# Patient Record
Sex: MALE | Race: WHITE | NOT HISPANIC OR LATINO
[De-identification: names, ages, dates, MRNs, and addresses within clinical notes are randomized per-mention and may not be internally consistent; named-entity substitution may affect disease eponyms.]

---

## 2018-01-01 ENCOUNTER — TRANSCRIPTION ENCOUNTER (OUTPATIENT)
Age: 43
End: 2018-01-01

## 2020-02-05 ENCOUNTER — APPOINTMENT (OUTPATIENT)
Dept: HEART AND VASCULAR | Facility: CLINIC | Age: 45
End: 2020-02-05
Payer: COMMERCIAL

## 2020-02-05 VITALS
BODY MASS INDEX: 30.46 KG/M2 | OXYGEN SATURATION: 99 % | DIASTOLIC BLOOD PRESSURE: 80 MMHG | RESPIRATION RATE: 14 BRPM | HEART RATE: 90 BPM | WEIGHT: 245 LBS | HEIGHT: 75 IN | SYSTOLIC BLOOD PRESSURE: 110 MMHG

## 2020-02-05 DIAGNOSIS — Z81.8 FAMILY HISTORY OF OTHER MENTAL AND BEHAVIORAL DISORDERS: ICD-10-CM

## 2020-02-05 DIAGNOSIS — Z82.49 FAMILY HISTORY OF ISCHEMIC HEART DISEASE AND OTHER DISEASES OF THE CIRCULATORY SYSTEM: ICD-10-CM

## 2020-02-05 DIAGNOSIS — F17.200 NICOTINE DEPENDENCE, UNSPECIFIED, UNCOMPLICATED: ICD-10-CM

## 2020-02-05 DIAGNOSIS — Z78.9 OTHER SPECIFIED HEALTH STATUS: ICD-10-CM

## 2020-02-05 DIAGNOSIS — Z86.39 PERSONAL HISTORY OF OTHER ENDOCRINE, NUTRITIONAL AND METABOLIC DISEASE: ICD-10-CM

## 2020-02-05 DIAGNOSIS — Z80.1 FAMILY HISTORY OF MALIGNANT NEOPLASM OF TRACHEA, BRONCHUS AND LUNG: ICD-10-CM

## 2020-02-05 DIAGNOSIS — Z87.891 PERSONAL HISTORY OF NICOTINE DEPENDENCE: ICD-10-CM

## 2020-02-05 DIAGNOSIS — R53.83 OTHER FATIGUE: ICD-10-CM

## 2020-02-05 DIAGNOSIS — Z87.19 PERSONAL HISTORY OF OTHER DISEASES OF THE DIGESTIVE SYSTEM: ICD-10-CM

## 2020-02-05 PROCEDURE — 93000 ELECTROCARDIOGRAM COMPLETE: CPT

## 2020-02-05 PROCEDURE — 99204 OFFICE O/P NEW MOD 45 MIN: CPT

## 2020-02-05 PROCEDURE — 36415 COLL VENOUS BLD VENIPUNCTURE: CPT

## 2020-02-05 RX ORDER — METFORMIN HYDROCHLORIDE 1000 MG/1
1000 TABLET, COATED ORAL TWICE DAILY
Qty: 180 | Refills: 1 | Status: DISCONTINUED | COMMUNITY
End: 2020-02-05

## 2020-02-05 RX ORDER — MIFEPRISTONE 300 MG/1
TABLET ORAL
Refills: 0 | Status: ACTIVE | COMMUNITY

## 2020-02-05 RX ORDER — PIOGLITAZONE HYDROCHLORIDE 30 MG/1
30 TABLET ORAL DAILY
Refills: 0 | Status: DISCONTINUED | COMMUNITY
End: 2020-02-05

## 2020-02-05 RX ORDER — ROSUVASTATIN CALCIUM 40 MG/1
40 TABLET, FILM COATED ORAL DAILY
Refills: 0 | Status: ACTIVE | COMMUNITY

## 2020-02-08 LAB
ALBUMIN SERPL ELPH-MCNC: 5 G/DL
ALP BLD-CCNC: 69 U/L
ALT SERPL-CCNC: 35 U/L
ANION GAP SERPL CALC-SCNC: 14 MMOL/L
APPEARANCE: CLEAR
AST SERPL-CCNC: 22 U/L
BASOPHILS # BLD AUTO: 0.07 K/UL
BASOPHILS NFR BLD AUTO: 0.8 %
BILIRUB SERPL-MCNC: 0.6 MG/DL
BILIRUBIN URINE: NEGATIVE
BLOOD URINE: NEGATIVE
BUN SERPL-MCNC: 14 MG/DL
CALCIUM SERPL-MCNC: 10.4 MG/DL
CHLORIDE SERPL-SCNC: 96 MMOL/L
CHOLEST SERPL-MCNC: 296 MG/DL
CHOLEST/HDLC SERPL: 8.5 RATIO
CO2 SERPL-SCNC: 25 MMOL/L
COLOR: YELLOW
CREAT SERPL-MCNC: 0.71 MG/DL
CREAT SPEC-SCNC: 92 MG/DL
EOSINOPHIL # BLD AUTO: 0.18 K/UL
EOSINOPHIL NFR BLD AUTO: 2 %
ESTIMATED AVERAGE GLUCOSE: 266 MG/DL
GLUCOSE QUALITATIVE U: NEGATIVE
GLUCOSE SERPL-MCNC: 262 MG/DL
HBA1C MFR BLD HPLC: 10.9 %
HCT VFR BLD CALC: 51 %
HDLC SERPL-MCNC: 35 MG/DL
HGB BLD-MCNC: 16.2 G/DL
IMM GRANULOCYTES NFR BLD AUTO: 0.2 %
KETONES URINE: NEGATIVE
LDLC SERPL CALC-MCNC: 202 MG/DL
LEUKOCYTE ESTERASE URINE: NEGATIVE
LYMPHOCYTES # BLD AUTO: 1.91 K/UL
LYMPHOCYTES NFR BLD AUTO: 21 %
MAGNESIUM SERPL-MCNC: 1.9 MG/DL
MAN DIFF?: NORMAL
MCHC RBC-ENTMCNC: 31.3 PG
MCHC RBC-ENTMCNC: 31.8 GM/DL
MCV RBC AUTO: 98.6 FL
MICROALBUMIN 24H UR DL<=1MG/L-MCNC: <1.2 MG/DL
MICROALBUMIN/CREAT 24H UR-RTO: NORMAL MG/G
MONOCYTES # BLD AUTO: 0.62 K/UL
MONOCYTES NFR BLD AUTO: 6.8 %
NEUTROPHILS # BLD AUTO: 6.3 K/UL
NEUTROPHILS NFR BLD AUTO: 69.2 %
NITRITE URINE: NEGATIVE
PH URINE: 6
PLATELET # BLD AUTO: 241 K/UL
POTASSIUM SERPL-SCNC: 4.9 MMOL/L
PROT SERPL-MCNC: 7.8 G/DL
PROTEIN URINE: NEGATIVE
RBC # BLD: 5.17 M/UL
RBC # FLD: 12.4 %
SODIUM SERPL-SCNC: 135 MMOL/L
SPECIFIC GRAVITY URINE: 1.01
TRIGL SERPL-MCNC: 292 MG/DL
TSH SERPL-ACNC: 1.52 UIU/ML
UROBILINOGEN URINE: NORMAL
WBC # FLD AUTO: 9.1 K/UL

## 2020-02-09 PROBLEM — R53.83 FATIGUE: Status: ACTIVE | Noted: 2020-02-09

## 2020-02-09 PROBLEM — Z78.9 CAFFEINE USE: Status: ACTIVE | Noted: 2020-02-05

## 2020-02-09 PROBLEM — F17.200 CURRENT EVERY DAY SMOKER: Status: ACTIVE | Noted: 2020-02-05

## 2020-02-09 PROBLEM — Z86.39 HISTORY OF TYPE 2 DIABETES MELLITUS: Status: RESOLVED | Noted: 2020-02-05 | Resolved: 2020-02-09

## 2020-02-09 PROBLEM — Z78.9 ALCOHOL INGESTION: Status: ACTIVE | Noted: 2020-02-05

## 2020-02-09 PROBLEM — Z87.19 HISTORY OF GASTRIC ULCER: Status: RESOLVED | Noted: 2020-02-05 | Resolved: 2020-02-09

## 2020-02-09 NOTE — REVIEW OF SYSTEMS
[Feeling Fatigued] : feeling fatigued [Numbness (Hypesthesia)] : numbness [Dyspnea on exertion] : dyspnea during exertion [Negative] : Heme/Lymph

## 2020-02-09 NOTE — PHYSICAL EXAM
[General Appearance - Well Developed] : well developed [Normal Appearance] : normal appearance [General Appearance - Well Nourished] : well nourished [No Deformities] : no deformities [Well Groomed] : well groomed [General Appearance - In No Acute Distress] : no acute distress [Normal Conjunctiva] : the conjunctiva exhibited no abnormalities [Eyelids - No Xanthelasma] : the eyelids demonstrated no xanthelasmas [No Oral Cyanosis] : no oral cyanosis [Heart Rate And Rhythm] : heart rate and rhythm were normal [Heart Sounds] : normal S1 and S2 [Murmurs] : no murmurs present [Respiration, Rhythm And Depth] : normal respiratory rhythm and effort [Edema] : no peripheral edema present [Auscultation Breath Sounds / Voice Sounds] : lungs were clear to auscultation bilaterally [Abnormal Walk] : normal gait [Exaggerated Use Of Accessory Muscles For Inspiration] : no accessory muscle use [Gait - Sufficient For Exercise Testing] : the gait was sufficient for exercise testing [Cyanosis, Localized] : no localized cyanosis [Nail Clubbing] : no clubbing of the fingernails [Petechial Hemorrhages (___cm)] : no petechial hemorrhages [Skin Color & Pigmentation] : normal skin color and pigmentation [Skin Turgor] : normal skin turgor [] : no rash [No Skin Ulcers] : no skin ulcer [No Venous Stasis] : no venous stasis [Skin Lesions] : no skin lesions [Oriented To Time, Place, And Person] : oriented to person, place, and time [No Xanthoma] : no  xanthoma was observed [Affect] : the affect was normal [Mood] : the mood was normal [No Anxiety] : not feeling anxious [FreeTextEntry1] : anicteric

## 2020-02-09 NOTE — REASON FOR VISIT
[Consultation] : a consultation regarding [Abnormal ECG] : an abnormal ECG [Dyspnea] : dyspnea [Hyperlipidemia] : hyperlipidemia [Hypertension] : hypertension [FreeTextEntry1] : 44 year old male  with poorly controlled type 2 diabetes , HTN  has LAFB on resting EKG and a family hx of premature CAD.   Last stress test was 6 to 7 years ago. \par \par He is being treated for hypercortisolism  with Santhosh

## 2020-02-09 NOTE — ASSESSMENT
[FreeTextEntry1] : suboptimal glycemic control , type 2 diabetes \par \par ? hypercortisolism  ( ? central etiology)\par \par Controlled HTN\par \par LAFB   with dyspnea on exertion \par \par \par smoker \par \par

## 2020-02-09 NOTE — DISCUSSION/SUMMARY
[Isotope Perfusion Sestamibi] : cardiac perfusion imaging with sestamibi [___ Week(s)] : [unfilled] week(s) [Outpatient Evaluation] : the evaluation will be done as an outpatient [With Me] : with me [de-identified] : NIECY [FreeTextEntry1] : venipuncture performed  with HgbA1c, lipids,  TSH\par \par \par set up nuclear stress test   and echocardiogram \par \par Counseled to quit smoking

## 2020-02-09 NOTE — HISTORY OF PRESENT ILLNESS
[FreeTextEntry1] : EKG shows NSR 85 bpm  with LAFB without ectopy, ischemia or LVH \par \par he has remote hx of peptic ulcer disease \par \par He has past hx of smoking , quit and then resumed smoking  this past January \par \par Denies hx of MI, CHF, syncope , asthma, strokes, TIAs\par \par Last HgbA1c 7.2%  (November 2019)\par \par Reports that he is being worked up  pituitary abnormality and has MRI scheduled (but he suffers from claustrophobia) \par \par Main complaints are fatigue and dyspnea on exertion

## 2020-03-09 ENCOUNTER — APPOINTMENT (OUTPATIENT)
Dept: HEART AND VASCULAR | Facility: CLINIC | Age: 45
End: 2020-03-09
Payer: COMMERCIAL

## 2020-03-09 PROCEDURE — 93306 TTE W/DOPPLER COMPLETE: CPT

## 2020-03-09 PROCEDURE — 78452 HT MUSCLE IMAGE SPECT MULT: CPT

## 2020-03-09 PROCEDURE — 93015 CV STRESS TEST SUPVJ I&R: CPT

## 2020-03-09 PROCEDURE — A9500: CPT

## 2020-07-06 ENCOUNTER — APPOINTMENT (OUTPATIENT)
Dept: HEART AND VASCULAR | Facility: CLINIC | Age: 45
End: 2020-07-06
Payer: COMMERCIAL

## 2020-07-06 VITALS
BODY MASS INDEX: 31.08 KG/M2 | OXYGEN SATURATION: 97 % | WEIGHT: 250 LBS | TEMPERATURE: 97.3 F | HEIGHT: 75 IN | RESPIRATION RATE: 14 BRPM | SYSTOLIC BLOOD PRESSURE: 118 MMHG | HEART RATE: 88 BPM | DIASTOLIC BLOOD PRESSURE: 76 MMHG

## 2020-07-06 DIAGNOSIS — R07.9 CHEST PAIN, UNSPECIFIED: ICD-10-CM

## 2020-07-06 DIAGNOSIS — R94.39 ABNORMAL RESULT OF OTHER CARDIOVASCULAR FUNCTION STUDY: ICD-10-CM

## 2020-07-06 PROCEDURE — 99213 OFFICE O/P EST LOW 20 MIN: CPT

## 2020-07-09 PROBLEM — R07.9 CHEST PAIN: Status: ACTIVE | Noted: 2020-07-09

## 2020-07-09 PROBLEM — R94.39 ABNORMAL NUCLEAR STRESS TEST: Status: ACTIVE | Noted: 2020-07-09

## 2020-07-09 NOTE — HISTORY OF PRESENT ILLNESS
[FreeTextEntry1] : Catheterization was urgently deferred because of Covid 19 epidemic  with numerous hospital admissions \par \par Now he presents for follow up. Still has vague left sided chest discomfort \par \par LVEF is  normal at 55% \par \par Remote hx of peptic ulcer disease \par \par  mg/dL  and he does not follow a diet

## 2020-07-09 NOTE — PHYSICAL EXAM
[General Appearance - Well Developed] : well developed [Normal Appearance] : normal appearance [Well Groomed] : well groomed [General Appearance - Well Nourished] : well nourished [No Deformities] : no deformities [General Appearance - In No Acute Distress] : no acute distress [Normal Conjunctiva] : the conjunctiva exhibited no abnormalities [Eyelids - No Xanthelasma] : the eyelids demonstrated no xanthelasmas [No Oral Cyanosis] : no oral cyanosis [Respiration, Rhythm And Depth] : normal respiratory rhythm and effort [Exaggerated Use Of Accessory Muscles For Inspiration] : no accessory muscle use [Auscultation Breath Sounds / Voice Sounds] : lungs were clear to auscultation bilaterally [Heart Rate And Rhythm] : heart rate and rhythm were normal [Heart Sounds] : normal S1 and S2 [Murmurs] : no murmurs present [Edema] : no peripheral edema present [Abnormal Walk] : normal gait [Gait - Sufficient For Exercise Testing] : the gait was sufficient for exercise testing [Nail Clubbing] : no clubbing of the fingernails [Cyanosis, Localized] : no localized cyanosis [Petechial Hemorrhages (___cm)] : no petechial hemorrhages [FreeTextEntry1] : atrophic hand muscles  [Skin Color & Pigmentation] : normal skin color and pigmentation [Skin Turgor] : normal skin turgor [] : no rash [Skin Lesions] : no skin lesions [No Venous Stasis] : no venous stasis [No Skin Ulcers] : no skin ulcer [No Xanthoma] : no  xanthoma was observed [Oriented To Time, Place, And Person] : oriented to person, place, and time [Affect] : the affect was normal [Mood] : the mood was normal [No Anxiety] : not feeling anxious

## 2020-07-09 NOTE — REASON FOR VISIT
[Follow-Up - Clinic] : a clinic follow-up of [Abnormal ECG] : an abnormal ECG [Hyperlipidemia] : hyperlipidemia [Hypertension] : hypertension [FreeTextEntry1] : 44 year old male  with poorly controlled type 2 diabetes , HTN  has LAFB on resting EKG and a family hx of premature CAD.   Nuclear stress test March 2020 was abnormal  and he was referred for cardiac catheterization\par \par He is being treated for hypercortisolism  with Santhosh

## 2020-07-09 NOTE — DISCUSSION/SUMMARY
[FreeTextEntry1] : advised to start aspirin 81 mg with food  OTC PPI  omeprazole \par \par Will  try to get Repatha authorization   to target LDL below 70mg/dL if obstructive CAD is detected on catheterization

## 2020-07-09 NOTE — REVIEW OF SYSTEMS
[Feeling Fatigued] : feeling fatigued [Dyspnea on exertion] : dyspnea during exertion [Chest Pain] : chest pain [Numbness (Hypesthesia)] : numbness [Negative] : Heme/Lymph

## 2020-07-09 NOTE — ASSESSMENT
[FreeTextEntry1] : Poorly controlled DM\par \par Hyperlipidemia \par \par abnormal nuclear stress test

## 2020-07-13 ENCOUNTER — LABORATORY RESULT (OUTPATIENT)
Age: 45
End: 2020-07-13

## 2020-07-13 PROBLEM — E11.9 TYPE 2 DIABETES MELLITUS WITHOUT COMPLICATIONS: Chronic | Status: ACTIVE | Noted: 2020-03-13

## 2020-07-13 PROBLEM — E78.5 HYPERLIPIDEMIA, UNSPECIFIED: Chronic | Status: ACTIVE | Noted: 2020-03-13

## 2020-07-13 PROBLEM — I10 ESSENTIAL (PRIMARY) HYPERTENSION: Chronic | Status: ACTIVE | Noted: 2020-03-13

## 2020-07-14 LAB
ALBUMIN SERPL ELPH-MCNC: 5.1 G/DL
ALP BLD-CCNC: 49 U/L
ALT SERPL-CCNC: 21 U/L
ANION GAP SERPL CALC-SCNC: 18 MMOL/L
APPEARANCE: CLEAR
APTT BLD: 29.5 SEC
AST SERPL-CCNC: 20 U/L
BASOPHILS # BLD AUTO: 0.06 K/UL
BASOPHILS NFR BLD AUTO: 0.7 %
BILIRUB SERPL-MCNC: 0.6 MG/DL
BILIRUBIN URINE: NEGATIVE
BLOOD URINE: NEGATIVE
BUN SERPL-MCNC: 22 MG/DL
CALCIUM SERPL-MCNC: 10.1 MG/DL
CHLORIDE SERPL-SCNC: 100 MMOL/L
CHOLEST SERPL-MCNC: 112 MG/DL
CHOLEST/HDLC SERPL: 3.6 RATIO
CO2 SERPL-SCNC: 22 MMOL/L
COLOR: YELLOW
CREAT SERPL-MCNC: 0.66 MG/DL
EOSINOPHIL # BLD AUTO: 0.22 K/UL
EOSINOPHIL NFR BLD AUTO: 2.4 %
ESTIMATED AVERAGE GLUCOSE: 263 MG/DL
GLUCOSE QUALITATIVE U: NEGATIVE
GLUCOSE SERPL-MCNC: 101 MG/DL
HBA1C MFR BLD HPLC: 10.8 %
HCT VFR BLD CALC: 47.2 %
HDLC SERPL-MCNC: 31 MG/DL
HGB BLD-MCNC: 15.1 G/DL
IMM GRANULOCYTES NFR BLD AUTO: 0.3 %
INR PPP: 0.9 RATIO
KETONES URINE: NEGATIVE
LDLC SERPL CALC-MCNC: 51 MG/DL
LEUKOCYTE ESTERASE URINE: ABNORMAL
LYMPHOCYTES # BLD AUTO: 2.32 K/UL
LYMPHOCYTES NFR BLD AUTO: 25.7 %
MAN DIFF?: NORMAL
MCHC RBC-ENTMCNC: 29.5 PG
MCHC RBC-ENTMCNC: 32 GM/DL
MCV RBC AUTO: 92.4 FL
MONOCYTES # BLD AUTO: 0.67 K/UL
MONOCYTES NFR BLD AUTO: 7.4 %
NEUTROPHILS # BLD AUTO: 5.72 K/UL
NEUTROPHILS NFR BLD AUTO: 63.5 %
NITRITE URINE: NEGATIVE
NT-PROBNP SERPL-MCNC: 25 PG/ML
PH URINE: 6.5
PLATELET # BLD AUTO: 204 K/UL
POTASSIUM SERPL-SCNC: 4.2 MMOL/L
PROT SERPL-MCNC: 7.4 G/DL
PROTEIN URINE: NEGATIVE
PT BLD: 10.8 SEC
RBC # BLD: 5.11 M/UL
RBC # FLD: 12.2 %
SARS-COV-2 N GENE NPH QL NAA+PROBE: NOT DETECTED
SODIUM SERPL-SCNC: 140 MMOL/L
SPECIFIC GRAVITY URINE: 1.02
TRIGL SERPL-MCNC: 148 MG/DL
TSH SERPL-ACNC: 1.59 UIU/ML
UROBILINOGEN URINE: NORMAL
WBC # FLD AUTO: 9.02 K/UL

## 2020-08-26 ENCOUNTER — OUTPATIENT (OUTPATIENT)
Dept: OUTPATIENT SERVICES | Facility: HOSPITAL | Age: 45
LOS: 1 days | End: 2020-08-26

## 2020-08-26 ENCOUNTER — APPOINTMENT (OUTPATIENT)
Dept: CT IMAGING | Facility: CLINIC | Age: 45
End: 2020-08-26
Payer: COMMERCIAL

## 2020-08-26 ENCOUNTER — RESULT REVIEW (OUTPATIENT)
Age: 45
End: 2020-08-26

## 2020-08-26 PROCEDURE — 75574 CT ANGIO HRT W/3D IMAGE: CPT | Mod: 26

## 2020-08-28 RX ORDER — CHLORHEXIDINE GLUCONATE 213 G/1000ML
1 SOLUTION TOPICAL ONCE
Refills: 0 | Status: DISCONTINUED | OUTPATIENT
Start: 2020-09-03 | End: 2020-09-03

## 2020-08-28 NOTE — H&P ADULT - HISTORY OF PRESENT ILLNESS
*Verify Meds*  COVID: 8/31 @ [address] [place] [address]  Escort: Brother in law  Pharmacy: [facility] [id number] [place] [address]  Cardiologist: Dr. Mcgill  43 yo M, current smoker, FH of premature CAD, with a PMH of HTN, known LAFB, uncontrolled Type II DM, who presented to outpatient cardiologist Dr. Mcgill as a new patient visit. In light of strong FH, patient was referred for stress testing. Denies any symptoms including CP, SOB, dizziness, diaphoresis, palpitations, orthopnea/PND, abdominal pain, N/V/D, urinary sx, BRBPR, hematuria, melena, LE swelling, recent travel/sick contacts/illness.. NST 3/9/2020: negative submaximal stress test for ischemia, EF 55%, 80% max predicted HR, .5mm upsloping ST depressions. Echocardiogram 3/9/2020: Normal RV/LVEF, EF 57%, hypokinesis of the following LV walls: posterolateral, anterolateral. CCTA 8/26/20: Calcium score 746 (99th percentile), severe stenosis in mLAD, moderate stenosis in pLAD and pRCA, unable to evaluate mRCA due to motion artifact, non-obstructive disease in remaining coronary segments.   In light of patient’s risk factors and abnormal CCTA, patient is referred for cardiac catheterization with possible intervention if clinically indicated.

## 2020-08-28 NOTE — H&P ADULT - NSICDXFAMILYHX_GEN_ALL_CORE_FT
FAMILY HISTORY:  FH: lung cancer, father  FH: myocardial infarction, Brother MI @ 41 (CABG), Father @ 55, Grandfather

## 2020-08-31 ENCOUNTER — LABORATORY RESULT (OUTPATIENT)
Age: 45
End: 2020-08-31

## 2020-09-03 ENCOUNTER — INPATIENT (INPATIENT)
Facility: HOSPITAL | Age: 45
LOS: 0 days | Discharge: HOME | DRG: 247 | End: 2020-09-04
Attending: INTERNAL MEDICINE | Admitting: INTERNAL MEDICINE
Payer: COMMERCIAL

## 2020-09-03 ENCOUNTER — TRANSCRIPTION ENCOUNTER (OUTPATIENT)
Age: 45
End: 2020-09-03

## 2020-09-03 VITALS
HEART RATE: 74 BPM | OXYGEN SATURATION: 96 % | DIASTOLIC BLOOD PRESSURE: 79 MMHG | SYSTOLIC BLOOD PRESSURE: 137 MMHG | RESPIRATION RATE: 16 BRPM

## 2020-09-03 LAB
A1C WITH ESTIMATED AVERAGE GLUCOSE RESULT: 8.4 % — HIGH (ref 4–5.6)
ALBUMIN SERPL ELPH-MCNC: 5 G/DL — SIGNIFICANT CHANGE UP (ref 3.3–5)
ALP SERPL-CCNC: 55 U/L — SIGNIFICANT CHANGE UP (ref 40–120)
ALT FLD-CCNC: 26 U/L — SIGNIFICANT CHANGE UP (ref 10–45)
ANION GAP SERPL CALC-SCNC: 14 MMOL/L — SIGNIFICANT CHANGE UP (ref 5–17)
APTT BLD: 28.4 SEC — SIGNIFICANT CHANGE UP (ref 27.5–35.5)
AST SERPL-CCNC: 21 U/L — SIGNIFICANT CHANGE UP (ref 10–40)
BASOPHILS # BLD AUTO: 0.06 K/UL — SIGNIFICANT CHANGE UP (ref 0–0.2)
BASOPHILS NFR BLD AUTO: 0.7 % — SIGNIFICANT CHANGE UP (ref 0–2)
BILIRUB SERPL-MCNC: 0.6 MG/DL — SIGNIFICANT CHANGE UP (ref 0.2–1.2)
BUN SERPL-MCNC: 17 MG/DL — SIGNIFICANT CHANGE UP (ref 7–23)
CALCIUM SERPL-MCNC: 9.7 MG/DL — SIGNIFICANT CHANGE UP (ref 8.4–10.5)
CHLORIDE SERPL-SCNC: 101 MMOL/L — SIGNIFICANT CHANGE UP (ref 96–108)
CHOLEST SERPL-MCNC: 101 MG/DL — SIGNIFICANT CHANGE UP (ref 10–199)
CK MB CFR SERPL CALC: 6 NG/ML — SIGNIFICANT CHANGE UP (ref 0–6.7)
CK SERPL-CCNC: 214 U/L — HIGH (ref 30–200)
CO2 SERPL-SCNC: 24 MMOL/L — SIGNIFICANT CHANGE UP (ref 22–31)
CREAT SERPL-MCNC: 0.71 MG/DL — SIGNIFICANT CHANGE UP (ref 0.5–1.3)
CRP SERPL-MCNC: 0.15 MG/DL — SIGNIFICANT CHANGE UP (ref 0–0.4)
EOSINOPHIL # BLD AUTO: 0.17 K/UL — SIGNIFICANT CHANGE UP (ref 0–0.5)
EOSINOPHIL NFR BLD AUTO: 1.9 % — SIGNIFICANT CHANGE UP (ref 0–6)
ESTIMATED AVERAGE GLUCOSE: 194 MG/DL — HIGH (ref 68–114)
GLUCOSE SERPL-MCNC: 157 MG/DL — HIGH (ref 70–99)
HCT VFR BLD CALC: 45.5 % — SIGNIFICANT CHANGE UP (ref 39–50)
HCV AB S/CO SERPL IA: 0.04 S/CO — SIGNIFICANT CHANGE UP
HCV AB SERPL-IMP: SIGNIFICANT CHANGE UP
HDLC SERPL-MCNC: 29 MG/DL — LOW
HGB BLD-MCNC: 15.3 G/DL — SIGNIFICANT CHANGE UP (ref 13–17)
IMM GRANULOCYTES NFR BLD AUTO: 0.6 % — SIGNIFICANT CHANGE UP (ref 0–1.5)
INR BLD: 0.91 — SIGNIFICANT CHANGE UP (ref 0.88–1.16)
LIPID PNL WITH DIRECT LDL SERPL: 42 MG/DL — SIGNIFICANT CHANGE UP
LYMPHOCYTES # BLD AUTO: 2.08 K/UL — SIGNIFICANT CHANGE UP (ref 1–3.3)
LYMPHOCYTES # BLD AUTO: 23 % — SIGNIFICANT CHANGE UP (ref 13–44)
MCHC RBC-ENTMCNC: 30.4 PG — SIGNIFICANT CHANGE UP (ref 27–34)
MCHC RBC-ENTMCNC: 33.6 GM/DL — SIGNIFICANT CHANGE UP (ref 32–36)
MCV RBC AUTO: 90.3 FL — SIGNIFICANT CHANGE UP (ref 80–100)
MONOCYTES # BLD AUTO: 0.58 K/UL — SIGNIFICANT CHANGE UP (ref 0–0.9)
MONOCYTES NFR BLD AUTO: 6.4 % — SIGNIFICANT CHANGE UP (ref 2–14)
NEUTROPHILS # BLD AUTO: 6.1 K/UL — SIGNIFICANT CHANGE UP (ref 1.8–7.4)
NEUTROPHILS NFR BLD AUTO: 67.4 % — SIGNIFICANT CHANGE UP (ref 43–77)
NRBC # BLD: 0 /100 WBCS — SIGNIFICANT CHANGE UP (ref 0–0)
PLATELET # BLD AUTO: 213 K/UL — SIGNIFICANT CHANGE UP (ref 150–400)
POTASSIUM SERPL-MCNC: 4.6 MMOL/L — SIGNIFICANT CHANGE UP (ref 3.5–5.3)
POTASSIUM SERPL-SCNC: 4.6 MMOL/L — SIGNIFICANT CHANGE UP (ref 3.5–5.3)
PROT SERPL-MCNC: 7.9 G/DL — SIGNIFICANT CHANGE UP (ref 6–8.3)
PROTHROM AB SERPL-ACNC: 11 SEC — SIGNIFICANT CHANGE UP (ref 10.6–13.6)
RBC # BLD: 5.04 M/UL — SIGNIFICANT CHANGE UP (ref 4.2–5.8)
RBC # FLD: 12.4 % — SIGNIFICANT CHANGE UP (ref 10.3–14.5)
SODIUM SERPL-SCNC: 139 MMOL/L — SIGNIFICANT CHANGE UP (ref 135–145)
TOTAL CHOLESTEROL/HDL RATIO MEASUREMENT: 3.5 RATIO — SIGNIFICANT CHANGE UP (ref 3.4–9.6)
TRIGL SERPL-MCNC: 152 MG/DL — HIGH (ref 10–149)
WBC # BLD: 9.04 K/UL — SIGNIFICANT CHANGE UP (ref 3.8–10.5)
WBC # FLD AUTO: 9.04 K/UL — SIGNIFICANT CHANGE UP (ref 3.8–10.5)

## 2020-09-03 PROCEDURE — 93458 L HRT ARTERY/VENTRICLE ANGIO: CPT | Mod: 26,59

## 2020-09-03 PROCEDURE — 93010 ELECTROCARDIOGRAM REPORT: CPT

## 2020-09-03 PROCEDURE — 92928 PRQ TCAT PLMT NTRAC ST 1 LES: CPT | Mod: RC

## 2020-09-03 PROCEDURE — 99231 SBSQ HOSP IP/OBS SF/LOW 25: CPT

## 2020-09-03 RX ORDER — SODIUM CHLORIDE 9 MG/ML
1000 INJECTION, SOLUTION INTRAVENOUS
Refills: 0 | Status: DISCONTINUED | OUTPATIENT
Start: 2020-09-03 | End: 2020-09-04

## 2020-09-03 RX ORDER — CLOPIDOGREL BISULFATE 75 MG/1
600 TABLET, FILM COATED ORAL ONCE
Refills: 0 | Status: COMPLETED | OUTPATIENT
Start: 2020-09-03 | End: 2020-09-03

## 2020-09-03 RX ORDER — SODIUM CHLORIDE 9 MG/ML
500 INJECTION INTRAMUSCULAR; INTRAVENOUS; SUBCUTANEOUS
Refills: 0 | Status: DISCONTINUED | OUTPATIENT
Start: 2020-09-03 | End: 2020-09-04

## 2020-09-03 RX ORDER — DEXTROSE 50 % IN WATER 50 %
15 SYRINGE (ML) INTRAVENOUS ONCE
Refills: 0 | Status: DISCONTINUED | OUTPATIENT
Start: 2020-09-03 | End: 2020-09-04

## 2020-09-03 RX ORDER — INFLUENZA VIRUS VACCINE 15; 15; 15; 15 UG/.5ML; UG/.5ML; UG/.5ML; UG/.5ML
0.5 SUSPENSION INTRAMUSCULAR ONCE
Refills: 0 | Status: DISCONTINUED | OUTPATIENT
Start: 2020-09-03 | End: 2020-09-04

## 2020-09-03 RX ORDER — ASPIRIN/CALCIUM CARB/MAGNESIUM 324 MG
243 TABLET ORAL ONCE
Refills: 0 | Status: COMPLETED | OUTPATIENT
Start: 2020-09-03 | End: 2020-09-03

## 2020-09-03 RX ORDER — ASPIRIN/CALCIUM CARB/MAGNESIUM 324 MG
81 TABLET ORAL DAILY
Refills: 0 | Status: DISCONTINUED | OUTPATIENT
Start: 2020-09-04 | End: 2020-09-04

## 2020-09-03 RX ORDER — CLOPIDOGREL BISULFATE 75 MG/1
75 TABLET, FILM COATED ORAL DAILY
Refills: 0 | Status: DISCONTINUED | OUTPATIENT
Start: 2020-09-04 | End: 2020-09-04

## 2020-09-03 RX ORDER — DEXTROSE 50 % IN WATER 50 %
12.5 SYRINGE (ML) INTRAVENOUS ONCE
Refills: 0 | Status: DISCONTINUED | OUTPATIENT
Start: 2020-09-03 | End: 2020-09-04

## 2020-09-03 RX ORDER — DEXTROSE 50 % IN WATER 50 %
25 SYRINGE (ML) INTRAVENOUS ONCE
Refills: 0 | Status: DISCONTINUED | OUTPATIENT
Start: 2020-09-03 | End: 2020-09-04

## 2020-09-03 RX ORDER — INSULIN LISPRO 100/ML
VIAL (ML) SUBCUTANEOUS
Refills: 0 | Status: DISCONTINUED | OUTPATIENT
Start: 2020-09-03 | End: 2020-09-04

## 2020-09-03 RX ORDER — SODIUM CHLORIDE 9 MG/ML
500 INJECTION INTRAMUSCULAR; INTRAVENOUS; SUBCUTANEOUS
Refills: 0 | Status: DISCONTINUED | OUTPATIENT
Start: 2020-09-03 | End: 2020-09-03

## 2020-09-03 RX ORDER — ATORVASTATIN CALCIUM 80 MG/1
80 TABLET, FILM COATED ORAL AT BEDTIME
Refills: 0 | Status: DISCONTINUED | OUTPATIENT
Start: 2020-09-03 | End: 2020-09-04

## 2020-09-03 RX ORDER — GLUCAGON INJECTION, SOLUTION 0.5 MG/.1ML
1 INJECTION, SOLUTION SUBCUTANEOUS ONCE
Refills: 0 | Status: DISCONTINUED | OUTPATIENT
Start: 2020-09-03 | End: 2020-09-04

## 2020-09-03 RX ADMIN — Medication 243 MILLIGRAM(S): at 14:27

## 2020-09-03 RX ADMIN — SODIUM CHLORIDE 100 MILLILITER(S): 9 INJECTION INTRAMUSCULAR; INTRAVENOUS; SUBCUTANEOUS at 18:51

## 2020-09-03 RX ADMIN — CLOPIDOGREL BISULFATE 600 MILLIGRAM(S): 75 TABLET, FILM COATED ORAL at 14:27

## 2020-09-03 RX ADMIN — SODIUM CHLORIDE 75 MILLILITER(S): 9 INJECTION INTRAMUSCULAR; INTRAVENOUS; SUBCUTANEOUS at 14:28

## 2020-09-03 NOTE — DISCHARGE NOTE PROVIDER - CARE PROVIDERS DIRECT ADDRESSES
[email or web address]

## 2020-09-03 NOTE — DISCHARGE NOTE PROVIDER - HOSPITAL COURSE
45 yo M, current smoker, FH of premature CAD, with a PMH of HTN, known LAFB, uncontrolled Type II DM, who presented to outpatient cardiologist Dr. Mcgill as a new patient visit. In light of strong FH, patient was referred for stress testing. Denies any symptoms including CP, SOB, dizziness, diaphoresis, palpitations, orthopnea/PND, abdominal pain, N/V/D, urinary sx, BRBPR, hematuria, melena, LE swelling, recent travel/sick contacts/illness.. NST 3/9/2020: negative submaximal stress test for ischemia, EF 55%, 80% max predicted HR, .5mm upsloping ST depressions. Echocardiogram 3/9/2020: Normal RV/LVEF, EF 57%, hypokinesis of the following LV walls: posterolateral, anterolateral. CCTA 8/26/20: Calcium score 746 (99th percentile), severe stenosis in mLAD, moderate stenosis in pLAD and pRCA, unable to evaluate mRCA due to motion artifact, non-obstructive disease in remaining coronary segments. In light of patient’s risk factors and abnormal CCTA, patient is referred for cardiac catheterization with possible intervention if clinically indicated.     Pt now s/p cardiac cath 9/3/20 receiving RENA mRCA with residual pLAD 80%, access R radial. Plan for pt to return for staged PCI in 4-6 weeks of LAD. Pt seen and examined at bedside this AM without any complaints or events overnight, access site stable non TTP, without ecchymosis, bleeding or hematoma, pulse 2+. VSS, labs and telemetry reviewed and pt stable for discharge as discussed with Dr. Reeder. Pt has received appropriate discharge instructions, including medication regimen, access site management and follow up with Dr. Mcgill in 1-2 weeks.  Discharge medication regimen has been reviewed with attending with plan for patient to take Aspirin 81 mg daily, Plavix 75 mg daily, and ______. 43 yo M, current smoker, FH of premature CAD, with a PMH of HTN, known LAFB, uncontrolled Type II DM, who presented to outpatient cardiologist Dr. Mcgill as a new patient visit. In light of strong FH, patient was referred for stress testing. Denies any symptoms including CP, SOB, dizziness, diaphoresis, palpitations, orthopnea/PND, abdominal pain, N/V/D, urinary sx, BRBPR, hematuria, melena, LE swelling, recent travel/sick contacts/illness.. NST 3/9/2020: negative submaximal stress test for ischemia, EF 55%, 80% max predicted HR, .5mm upsloping ST depressions. Echocardiogram 3/9/2020: Normal RV/LVEF, EF 57%, hypokinesis of the following LV walls: posterolateral, anterolateral. CCTA 8/26/20: Calcium score 746 (99th percentile), severe stenosis in mLAD, moderate stenosis in pLAD and pRCA, unable to evaluate mRCA due to motion artifact, non-obstructive disease in remaining coronary segments. In light of patient’s risk factors and abnormal CCTA, patient is referred for cardiac catheterization with possible intervention if clinically indicated.     Pt now s/p cardiac cath 9/3/20 receiving RENA mRCA with residual pLAD 80%, access R radial. RHC pressures normal per fellow Panda. Plan for pt to return for staged PCI in 4-6 weeks of LAD.         Pt seen and examined at bedside this AM without any complaints. No overnight events on tele. HD stable. AM labs WNL. Access site stable, C/D/I, with 2+ distal pulses. Discharge medication regimen has been reviewed with attending with plan for patient to take Aspirin 81 mg daily, Plavix 75 mg daily, rosuvastatin 40mg QD, lisinopril 40mg QD, Alprazolam .25mg PRN, Vascepa 1G QD and Jentadueto 2.5/1000mg QD. Pt instructed to HOLD Jentadueto until 9/6 AM. Per Dr. Mosqueda, pt should start BB as an outpatient. As per Dr. Mosqueda, patient is stable for discharge home. Pt has received appropriate discharge instructions, including medication regimen, access site management, plan to return in 4-6 weeks and follow up with Dr. Mcgill in 1-2 weeks.

## 2020-09-03 NOTE — DISCHARGE NOTE PROVIDER - INSTRUCTIONS
Please continue to follow a heart healthy diet, low in sodium, cholesterol and fats. A Mediterranean Diet is recommended! Some suggestions include continue incorporating 2 or more servings per day of vegetables, fruits, and whole grains. Increase intake of fish and legumes/beans to 2 or more servings per week. Aim to increase intake of healthy fats, such as olive oil and avocados, and have a handful of nuts/seeds most days. Reduce red/processed meat consumption to 2 or fewer times per week.

## 2020-09-03 NOTE — DISCHARGE NOTE PROVIDER - NSDCFUADDINST_GEN_ALL_CORE_FT
You underwent a coronary angiogram and should wait 3 days before returning to ordinary activities. Do not drive for 2 days. Consult your doctor before returning to vigorous activity. You may return to work in 3-5 days. The catheter from your wrist was removed. You may shower once the dressing is removed, but avoid baths, hot tubs, or swimming for 5 days to prevent infection. If you notice bleeding from the site, hardening and pain at the site, drainage or redness from the site, coolness/paleness of the wrist, weakness/paralysis of right arm (unable to lift or move) swelling, or fever, please call [phone]. Please continue your Aspirin and Plavix as prescribed unless otherwise indicated by your cardiologist. Please follow up with Dr. Mcgill in 1-2 weeks. All of your needed prescriptions have been sent electronically to your pharmacy.

## 2020-09-03 NOTE — DISCHARGE NOTE PROVIDER - NSDCCPCAREPLAN_GEN_ALL_CORE_FT
PRINCIPAL DISCHARGE DIAGNOSIS  Diagnosis: CAD (coronary artery disease)  Assessment and Plan of Treatment: You were found to have blockages in the arteries of your heart, also known as Coronary Artery Diseease. You underwent a cardiac angiogram on 9/3/20 and received a stent to the right coronary artery. There is still one more artery that is blocked that you must return in 4-6 weeks to get a stent. PLEASE CONTINUE ASPIRIN 81MG DAILY AND PLAVIX 75MG DAILY. DO NOT STOP THESE MEDICATIONS FOR ANY REASON AS THEY ARE KEEPING YOUR STENT OPEN AND PREVENTING A HEART ATTACK.   Avoid strenuous activity or heavy lifting anything more than 5lbs for the next five days. Do not take a bath or swim for the next five days; you may shower. For any bleeding or hematoma formation (hardened blood collection under the skin) at the access site of your right wrist please hold pressure and go to the emergency room. Please follow up with Dr. Mcgill in 1-2 weeks and at that time please make an appointment to return in 4-6 weeks to get a second stent. For recurrent chest pain, please call your doctor or go to the emergency room.      SECONDARY DISCHARGE DIAGNOSES  Diagnosis: HTN (hypertension)  Assessment and Plan of Treatment: Please continue ___ as listed to keep your blood pressure controlled. For blood pressure that is too high or too low please see your doctor or go to the emergency room as necessary.    Diagnosis: HLD (hyperlipidemia)  Assessment and Plan of Treatment: Please continue ____ at bedtime to keep your cholesterol low. High cholesterol contributes to heart disease.    Diagnosis: DM (diabetes mellitus)  Assessment and Plan of Treatment: Your Hemoglobin A1c is 8.4% and your goal A1c is less than 7.0%. This number measures your average blood sugar level over the last three months. Please continue ___ as listed for diabetes. Maintain a low carbohydrate, low sugar diet, exercise, monitor your fingerstick blood sugars regarly and follow up with your Endocrinologist/Primary Care Doctor. PRINCIPAL DISCHARGE DIAGNOSIS  Diagnosis: CAD (coronary artery disease)  Assessment and Plan of Treatment: You were found to have blockages in the arteries of your heart, also known as Coronary Artery Diseease. You underwent a cardiac angiogram on 9/3/20 and received a stent to the right coronary artery. There is still one more artery that is blocked that you must return in 4-6 weeks to get a stent. PLEASE CONTINUE ASPIRIN 81MG DAILY AND PLAVIX 75MG DAILY. DO NOT STOP THESE MEDICATIONS FOR ANY REASON AS THEY ARE KEEPING YOUR STENT OPEN AND PREVENTING A HEART ATTACK.   Avoid strenuous activity or heavy lifting anything more than 5lbs for the next five days. Do not take a bath or swim for the next five days; you may shower. For any bleeding or hematoma formation (hardened blood collection under the skin) at the access site of your right wrist please hold pressure and go to the emergency room. Please follow up with Dr. Mcgill in 1-2 weeks and at that time please make an appointment to return in 4-6 weeks to get a second stent. For recurrent chest pain, please call your doctor or go to the emergency room.      SECONDARY DISCHARGE DIAGNOSES  Diagnosis: Smoker  Assessment and Plan of Treatment: Smoking is a major modifiable risk factor for Heart Disease and several types of Cancers. You were advised regarding Smoking cessation during your hospitalization, which is difficult but imperative for your health. For further support and options to help you quit, please talk to your Primary Care Doctor or call The [facility] Smokers Quitline at 6-610-AU-QUITS ([phone]).    Diagnosis: HLD (hyperlipidemia)  Assessment and Plan of Treatment: Please continue rosuvastatin 40mg at bedtime to keep your cholesterol low. High cholesterol contributes to heart disease.    Diagnosis: HTN (hypertension)  Assessment and Plan of Treatment: Please continue lisinopril 40mg QD as listed to keep your blood pressure controlled. For blood pressure that is too high or too low please see your doctor or go to the emergency room as necessary.    Diagnosis: DM (diabetes mellitus)  Assessment and Plan of Treatment: Your Hemoglobin A1c is 8.4% and your goal A1c is less than 7.0%. This number measures your average blood sugar level over the last three months.  Please continue Jentadueto, there have been no changes  -Please HOLD Jentadueto for 2 days and RESTART on 9/6 .   -Maintain a low carbohydrate, low sugar diet, exercise, monitor your fingerstick blood sugars regarly and follow up with your Endocrinologist/Primary Care Doctor.

## 2020-09-03 NOTE — DISCHARGE NOTE PROVIDER - NSDCMRMEDTOKEN_GEN_ALL_CORE_FT
ALPRAZolam 0.25 mg oral tablet: 1 tab(s) orally prn  Janumet 50 mg-1000 mg oral tablet: 1 tab(s) orally 2 times a day  lisinopril 40 mg oral tablet: 1 tab(s) orally once a day  rosuvastatin 40 mg oral tablet: 1 tab(s) orally once a day  Vascepa 1 g oral capsule: 2 cap(s) orally 2 times a day ALPRAZolam 0.25 mg oral tablet: 1 tab(s) orally prn  aspirin 81 mg oral delayed release tablet: 1 tab(s) orally once a day  clopidogrel 75 mg oral tablet: 1 tab(s) orally once a day  Janumet 50 mg-1000 mg oral tablet: 1 tab(s) orally 2 times a day  lisinopril 40 mg oral tablet: 1 tab(s) orally once a day  rosuvastatin 40 mg oral tablet: 1 tab(s) orally once a day  Vascepa 1 g oral capsule: 2 cap(s) orally 2 times a day

## 2020-09-03 NOTE — DISCHARGE NOTE PROVIDER - CARE PROVIDER_API CALL
Jose Mcgill  CARDIOVASCULAR DISEASE  [address], [place] [address]  Phone: [phone]  Fax: [phone]  Follow Up Time: 1 week

## 2020-09-03 NOTE — CONSULT NOTE ADULT - SUBJECTIVE AND OBJECTIVE BOX
Preventive Cardiology Consultation Note    CHIEF COMPLAINT: s/p cardiac catheterization requiring cardiovascular prevention optimization and education    HISTORY OF PRESENT ILLNESS: 45 yo M, current smoker, FH of premature CAD, with a PMH of HTN, known LAFB, and uncontrolled Type II DM, who presented to outpatient cardiologist Dr. Mcgill as a new patient to establish care. CCTA 8/26/20: Calcium score 746 (99th percentile), severe stenosis in mLAD, moderate stenosis in pLAD and pRCA, unable to evaluate mRCA due to motion artifact, non-obstructive disease in remaining coronary segments. Patient is now s/p cardiac cath: RENA mRCA (80%) with residual pLAD 80% with plan for staged PCI in 4-6 weeks.    Review of systems otherwise negative.     Lifestyle History:  Mediterranean Diet Score (9 question survey) was 4.   (8-9: optimal, 6-7: near-optimal, 4-5: suboptimal, 0-3: markedly suboptimal)  Exercise: Patient reports exercising at a moderate level for <30 minutes per week.   Smoking: Current smoker (1 PPD x 20 years).  Stress: Patient denies any stress.     PAST MEDICAL & SURGICAL HISTORY:  Hypertension  Hyperlipidemia  Obesity  Type II diabetes mellitus  No significant past surgical history    FAMILY HISTORY:   myocardial infarction, Brother MI @ 41 (CABG), Father @ 55, Grandfather    Allergies:   No Known Allergies      HOME MEDICATIONS:   ALPRAZolam 0.25 mg oral tablet: 1 tab(s) orally prn (13 Jul 2020 13:38)  Janumet 50 mg-1000 mg oral tablet: 1 tab(s) orally 2 times a day (13 Jul 2020 13:38)  lisinopril 40 mg oral tablet: 1 tab(s) orally once a day (13 Jul 2020 13:38)  rosuvastatin 40 mg oral tablet: 1 tab(s) orally once a day (13 Jul 2020 13:38)  Vascepa 1 g oral capsule: 2 cap(s) orally 2 times a day (13 Jul 2020 13:38)      PHYSICAL EXAM:  T(C): 36.6 (09-04-20 @ 05:25), Max: 36.6 (09-04-20 @ 05:25)  T(F): 97.8 (09-04-20 @ 05:25), Max: 97.8 (09-04-20 @ 05:25)  HR: 72 (09-04-20 @ 05:02) (64 - 74)  BP: 116/66 (09-04-20 @ 05:02) (107/58 - 138/74)  RR: 18 (09-04-20 @ 05:02) (14 - 18)  SpO2: 95% (09-04-20 @ 05:02) (93% - 99%)  Height (cm): 190.5 (09-03 @ 20:30)  Weight (kg): 114.5 (09-03 @ 20:30)  BMI (kg/m2): 31.6 (09-03 @ 20:30)  	  Gen- awake, conversive  Head-NCAT; eyes: no corneal arcus noted b/l; no xanthelasmas   Neck- no thyromegaly, no cervical LAD, no JVD, no carotid bruit b/l  Respiratory- good air entry b/l, no WRR  Cardiovascular- S1S2, RRR, no MRG appr, no LE edema b/l, distal pulses 2+ b/l  Gastrointestinal- no HSM, no masses  Neurology- moves all extremities, no focal deficits  Psych- normal affect, non-depressed mood  Skin- no lesions, no rashes, no xanthomas     LABS:	                        13.7   7.74  )-----------( 185      ( 04 Sep 2020 05:11 )             41.0     09-04    139  |  102  |  12  ----------------------------<  167<H>  4.3   |  26  |  0.62    Ca    9.4      04 Sep 2020 05:11  Mg     2.1     09-04    TPro  7.9  /  Alb  5.0  /  TBili  0.6  /  DBili  x   /  AST  21  /  ALT  26  /  AlkPhos  55  09-03      Cholesterol, Serum: 101 mg/dL (09-03 @ 13:57)  HDL Cholesterol, Serum: 29 mg/dL (09-03 @ 13:57)  Triglycerides, Serum: 152 mg/dL (09-03 @ 13:57)  Direct LDL: 42 mg/dL (09-03 @ 13:57)    C-Reactive Protein, Serum: 0.15 mg/dL (09-03 @ 13:57)      ASSESSMENT/RECOMMENDATIONS: 	  Patient's dietary, exercise and overall lifestyle habits were reviewed. The concept of atherosclerosis and its systemic nature was discussed with a focus on the need to get all cardiovascular risk factors to goal. At this time, I would like to make the following recommendations to optimize atherosclerotic risk factors.     RECOMMENDATIONS:   Anti-platelet Therapy: DAPT per interventionalist recommendation.   Lipid Therapy: Patient is currently taking atorvastatin 40mg and Vascepa daily and is compliant and tolerating them both well. We believe this is an appropriate medication regimen at this time, as his current LDL-C is at goal and lifestyle modifications will likely benefit him further. We would also recommend checking the Lipoprotein A level (Lpa) to assess for a possible genetic component. If elevated, we advise the first degree relatives - siblings and children - also be checked, as it is a strong risk factor for ASCVD.   Hypertension: Blood pressures during this stay were well-controlled.   Mediterranean Diet Score is 4. Some suggestions include continue incorporating 2 or more servings per day of vegetables, fruits, and whole grains. Increase intake of fish and legumes/beans to 2 or more servings per week. Aim to increase intake of healthy fats, such as olive oil and avocados, and have a handful of nuts/seeds most days. Reduce red/processed meat consumption to 2 or fewer times per week.   Exercise: Recommended gradually increasing activity to 30-45 minutes most days of the week once cleared by referring cardiologist. Cardiac rehab might benefit this patient and is covered by major insurance plans (other than co-pays), please refer.   Medication Adherence: Patient has no issues with adherence at this time.   Smoking: Smoking cessation was strongly encouraged and discussed with the patient. We suggested picking a day in the future that will be the planned quit date. We recommend following up with his PCP for further assistance, if needed for nicotine replacement therapy. The risks to overall health if he continues to smoke were discussed, and patient expressed understanding.   Obesity/Overweight: The patient was advised about specific mechanisms such as reduced portions and increased activity for efforts toward weight loss.   Glucometabolic State: Patient's blood sugar is not well-controlled on the current regimen at this time. HbA1c today was 8.4%. Depending on discussions with patient's PCP and/or endocrinologist, we would recommend the possibility of a GLP-1 agonist or SGLT-2 inhibitor, as these newer agents have cardiovascular benefits as well as glucose control. The goal is to transition to more guideline-based cardioprotective agents that will reduce the risk of recurrent CV events.  Sleep Apnea: The patient is at low risk for sleep apnea.   Psychological Stress: The patient appears to be coping with stressors well at this time.     Thank you for the opportunity to see this patient. Please feel free to contact Prevention if there are any questions, or if you feel that your patient would benefit from continued follow-up visits with the Program.    BEULAH Castellon-BC  Cardiovascular Prevention     Queta Nix MD  System Director, Cardiovascular Prevention

## 2020-09-04 ENCOUNTER — TRANSCRIPTION ENCOUNTER (OUTPATIENT)
Age: 45
End: 2020-09-04

## 2020-09-04 VITALS — TEMPERATURE: 98 F

## 2020-09-04 LAB
ANION GAP SERPL CALC-SCNC: 11 MMOL/L — SIGNIFICANT CHANGE UP (ref 5–17)
BUN SERPL-MCNC: 12 MG/DL — SIGNIFICANT CHANGE UP (ref 7–23)
CALCIUM SERPL-MCNC: 9.4 MG/DL — SIGNIFICANT CHANGE UP (ref 8.4–10.5)
CHLORIDE SERPL-SCNC: 102 MMOL/L — SIGNIFICANT CHANGE UP (ref 96–108)
CO2 SERPL-SCNC: 26 MMOL/L — SIGNIFICANT CHANGE UP (ref 22–31)
CREAT SERPL-MCNC: 0.62 MG/DL — SIGNIFICANT CHANGE UP (ref 0.5–1.3)
GLUCOSE SERPL-MCNC: 167 MG/DL — HIGH (ref 70–99)
HCT VFR BLD CALC: 41 % — SIGNIFICANT CHANGE UP (ref 39–50)
HGB BLD-MCNC: 13.7 G/DL — SIGNIFICANT CHANGE UP (ref 13–17)
MAGNESIUM SERPL-MCNC: 2.1 MG/DL — SIGNIFICANT CHANGE UP (ref 1.6–2.6)
MCHC RBC-ENTMCNC: 30 PG — SIGNIFICANT CHANGE UP (ref 27–34)
MCHC RBC-ENTMCNC: 33.4 GM/DL — SIGNIFICANT CHANGE UP (ref 32–36)
MCV RBC AUTO: 89.9 FL — SIGNIFICANT CHANGE UP (ref 80–100)
NRBC # BLD: 0 /100 WBCS — SIGNIFICANT CHANGE UP (ref 0–0)
PLATELET # BLD AUTO: 185 K/UL — SIGNIFICANT CHANGE UP (ref 150–400)
POTASSIUM SERPL-MCNC: 4.3 MMOL/L — SIGNIFICANT CHANGE UP (ref 3.5–5.3)
POTASSIUM SERPL-SCNC: 4.3 MMOL/L — SIGNIFICANT CHANGE UP (ref 3.5–5.3)
RBC # BLD: 4.56 M/UL — SIGNIFICANT CHANGE UP (ref 4.2–5.8)
RBC # FLD: 12.3 % — SIGNIFICANT CHANGE UP (ref 10.3–14.5)
SODIUM SERPL-SCNC: 139 MMOL/L — SIGNIFICANT CHANGE UP (ref 135–145)
WBC # BLD: 7.74 K/UL — SIGNIFICANT CHANGE UP (ref 3.8–10.5)
WBC # FLD AUTO: 7.74 K/UL — SIGNIFICANT CHANGE UP (ref 3.8–10.5)

## 2020-09-04 PROCEDURE — 93010 ELECTROCARDIOGRAM REPORT: CPT

## 2020-09-04 RX ORDER — CLOPIDOGREL BISULFATE 75 MG/1
1 TABLET, FILM COATED ORAL
Qty: 30 | Refills: 11
Start: 2020-09-04 | End: 2021-08-29

## 2020-09-04 RX ORDER — ASPIRIN/CALCIUM CARB/MAGNESIUM 324 MG
1 TABLET ORAL
Qty: 30 | Refills: 11
Start: 2020-09-04 | End: 2021-08-29

## 2020-09-04 RX ADMIN — Medication 1: at 07:10

## 2020-09-04 NOTE — DISCHARGE NOTE NURSING/CASE MANAGEMENT/SOCIAL WORK - PATIENT PORTAL LINK FT
You can access the FollowMyHealth Patient Portal offered by [facility] by registering at the following website: [email or web address]. By joining [facility]’s FollowMyHealth portal, you will also be able to view your health information using other applications (apps) compatible with our system.

## 2020-09-09 ENCOUNTER — APPOINTMENT (OUTPATIENT)
Dept: HEART AND VASCULAR | Facility: CLINIC | Age: 45
End: 2020-09-09
Payer: COMMERCIAL

## 2020-09-09 VITALS
OXYGEN SATURATION: 98 % | HEART RATE: 83 BPM | RESPIRATION RATE: 14 BRPM | DIASTOLIC BLOOD PRESSURE: 80 MMHG | BODY MASS INDEX: 30.59 KG/M2 | SYSTOLIC BLOOD PRESSURE: 130 MMHG | TEMPERATURE: 97.5 F | HEIGHT: 75 IN | WEIGHT: 246 LBS

## 2020-09-09 DIAGNOSIS — E66.9 OBESITY, UNSPECIFIED: ICD-10-CM

## 2020-09-09 DIAGNOSIS — I44.4 LEFT ANTERIOR FASCICULAR BLOCK: ICD-10-CM

## 2020-09-09 DIAGNOSIS — E11.65 TYPE 2 DIABETES MELLITUS WITH HYPERGLYCEMIA: ICD-10-CM

## 2020-09-09 DIAGNOSIS — I10 ESSENTIAL (PRIMARY) HYPERTENSION: ICD-10-CM

## 2020-09-09 DIAGNOSIS — I25.10 ATHEROSCLEROTIC HEART DISEASE OF NATIVE CORONARY ARTERY WITHOUT ANGINA PECTORIS: ICD-10-CM

## 2020-09-09 DIAGNOSIS — E78.5 HYPERLIPIDEMIA, UNSPECIFIED: ICD-10-CM

## 2020-09-09 DIAGNOSIS — F17.210 NICOTINE DEPENDENCE, CIGARETTES, UNCOMPLICATED: ICD-10-CM

## 2020-09-09 DIAGNOSIS — Z79.899 OTHER LONG TERM (CURRENT) DRUG THERAPY: ICD-10-CM

## 2020-09-09 DIAGNOSIS — Z82.49 FAMILY HISTORY OF ISCHEMIC HEART DISEASE AND OTHER DISEASES OF THE CIRCULATORY SYSTEM: ICD-10-CM

## 2020-09-09 PROCEDURE — 99214 OFFICE O/P EST MOD 30 MIN: CPT

## 2020-09-09 PROCEDURE — 93000 ELECTROCARDIOGRAM COMPLETE: CPT

## 2020-09-09 NOTE — HISTORY OF PRESENT ILLNESS
[FreeTextEntry1] : cardiac catheterization revealed significant disease of the RCA and LAD\par \par \par RCA stenosis was treated  with RENA  and he was told to take DAPT  but says the plavix was never ordered to the pharmacy \par \par EKG shows NSR 79 bpm  with left  axis deviation without ectopy, ischemia or LVH   QTc is 411 msec\par \par Patient denies bleeding from any orifices nor significant bruising \par \par FS is 157 at this time \par \par He has occasional palpitations and am fatigue  but denies chest pain

## 2020-09-09 NOTE — REASON FOR VISIT
[Follow-Up - From Hospitalization] : follow-up of a recent hospitalization for [Abnormal ECG] : an abnormal ECG [Coronary Artery Disease] : coronary artery disease [Palpitations] : palpitations [Hyperlipidemia] : hyperlipidemia [FreeTextEntry2] : s/p PTCA/RENA of RCA last week  [FreeTextEntry1] : 44 year old male  with poorly controlled type 2 diabetes , HTN  has LAFB on resting EKG and a family hx of premature CAD had  abnormal   nuclear stress test March 2020 was   and  was referred for cardiac catheterization only after a coronary CTA  was performed showing significant multivessel CAD. \par \par He is being treated for hypercortisolism  with Korlyn

## 2020-09-09 NOTE — PHYSICAL EXAM
[General Appearance - Well Developed] : well developed [Well Groomed] : well groomed [Normal Appearance] : normal appearance [General Appearance - Well Nourished] : well nourished [No Deformities] : no deformities [General Appearance - In No Acute Distress] : no acute distress [Normal Conjunctiva] : the conjunctiva exhibited no abnormalities [Eyelids - No Xanthelasma] : the eyelids demonstrated no xanthelasmas [No Oral Cyanosis] : no oral cyanosis [Exaggerated Use Of Accessory Muscles For Inspiration] : no accessory muscle use [Auscultation Breath Sounds / Voice Sounds] : lungs were clear to auscultation bilaterally [Respiration, Rhythm And Depth] : normal respiratory rhythm and effort [Heart Sounds] : normal S1 and S2 [Heart Rate And Rhythm] : heart rate and rhythm were normal [Murmurs] : no murmurs present [Gait - Sufficient For Exercise Testing] : the gait was sufficient for exercise testing [Edema] : no peripheral edema present [Abnormal Walk] : normal gait [Nail Clubbing] : no clubbing of the fingernails [Cyanosis, Localized] : no localized cyanosis [Petechial Hemorrhages (___cm)] : no petechial hemorrhages [FreeTextEntry1] : atrophic hand muscles  [] : no rash [Skin Turgor] : normal skin turgor [Skin Color & Pigmentation] : normal skin color and pigmentation [No Venous Stasis] : no venous stasis [Skin Lesions] : no skin lesions [No Skin Ulcers] : no skin ulcer [Oriented To Time, Place, And Person] : oriented to person, place, and time [No Xanthoma] : no  xanthoma was observed [Affect] : the affect was normal [Mood] : the mood was normal [No Anxiety] : not feeling anxious

## 2020-09-09 NOTE — ASSESSMENT
[FreeTextEntry1] : CAD  s/p successful PTCA/RENA of RCA  \par \par \par diabetes \par \par \par  residual CAD

## 2020-09-09 NOTE — DISCUSSION/SUMMARY
[Coronary Artery Disease] : coronary artery disease [Stent Placement] : stent placement [Improving] : improving [___ Week(s)] : [unfilled] week(s) [de-identified] : stage 2 PTCA/RENA of LAD in one month [With Me] : with me [FreeTextEntry1] : clopidogrel 75mg po qd with food ordered to take with  aspirin 81mg \par \par obtain cath report and discharge papers \par \par answered all questions\par \par refuses flu vaccine

## 2020-09-21 PROCEDURE — 83735 ASSAY OF MAGNESIUM: CPT

## 2020-09-21 PROCEDURE — C1769: CPT

## 2020-09-21 PROCEDURE — C1874: CPT

## 2020-09-21 PROCEDURE — 83036 HEMOGLOBIN GLYCOSYLATED A1C: CPT

## 2020-09-21 PROCEDURE — 86803 HEPATITIS C AB TEST: CPT

## 2020-09-21 PROCEDURE — 82550 ASSAY OF CK (CPK): CPT

## 2020-09-21 PROCEDURE — C1894: CPT

## 2020-09-21 PROCEDURE — 85027 COMPLETE CBC AUTOMATED: CPT

## 2020-09-21 PROCEDURE — 93005 ELECTROCARDIOGRAM TRACING: CPT

## 2020-09-21 PROCEDURE — C1887: CPT

## 2020-09-21 PROCEDURE — 80053 COMPREHEN METABOLIC PANEL: CPT

## 2020-09-21 PROCEDURE — 85730 THROMBOPLASTIN TIME PARTIAL: CPT

## 2020-09-21 PROCEDURE — 82553 CREATINE MB FRACTION: CPT

## 2020-09-21 PROCEDURE — C1725: CPT

## 2020-09-21 PROCEDURE — 80048 BASIC METABOLIC PNL TOTAL CA: CPT

## 2020-09-21 PROCEDURE — 85610 PROTHROMBIN TIME: CPT

## 2020-09-21 PROCEDURE — 85025 COMPLETE CBC W/AUTO DIFF WBC: CPT

## 2020-09-21 PROCEDURE — 86140 C-REACTIVE PROTEIN: CPT

## 2020-09-21 PROCEDURE — 36415 COLL VENOUS BLD VENIPUNCTURE: CPT

## 2020-09-21 PROCEDURE — 82962 GLUCOSE BLOOD TEST: CPT

## 2020-09-21 PROCEDURE — 80061 LIPID PANEL: CPT

## 2020-10-21 VITALS
SYSTOLIC BLOOD PRESSURE: 125 MMHG | HEIGHT: 75 IN | DIASTOLIC BLOOD PRESSURE: 71 MMHG | HEART RATE: 78 BPM | WEIGHT: 250 LBS

## 2020-10-21 RX ORDER — CHLORHEXIDINE GLUCONATE 213 G/1000ML
1 SOLUTION TOPICAL ONCE
Refills: 0 | Status: DISCONTINUED | OUTPATIENT
Start: 2020-10-22 | End: 2020-10-22

## 2020-10-21 NOTE — H&P ADULT - NSHPLABSRESULTS_GEN_ALL_CORE
16.8   8.95  )-----------( 227      ( 22 Oct 2020 13:43 )             48.9               PT/INR - ( 22 Oct 2020 13:43 )   PT: 11.7 sec;   INR: 0.97          PTT - ( 22 Oct 2020 13:43 )  PTT:28.9 sec              EKG: NSR w/ TWI in lead III; occasional PVC 16.8   8.95  )-----------( 227      ( 22 Oct 2020 13:43 )             48.9       10-22    138  |  98  |  17  ----------------------------<  175<H>  4.3   |  24  |  0.67    Ca    10.1      22 Oct 2020 13:43    TPro  8.8<H>  /  Alb  5.4<H>  /  TBili  0.8  /  DBili  x   /  AST  22  /  ALT  31  /  AlkPhos  53  10-22      PT/INR - ( 22 Oct 2020 13:43 )   PT: 11.7 sec;   INR: 0.97          PTT - ( 22 Oct 2020 13:43 )  PTT:28.9 sec    CARDIAC MARKERS ( 22 Oct 2020 13:43 )  x     / x     / 104 U/L / x     / 4.4 ng/mL            EKG: NSR w/ TWI in lead III; occasional PVC noted.

## 2020-10-21 NOTE — H&P ADULT - NSICDXPASTMEDICALHX_GEN_ALL_CORE_FT
PAST MEDICAL HISTORY:  Coronary artery disease     Hyperlipidemia     Hypertension     Type II diabetes mellitus

## 2020-10-21 NOTE — H&P ADULT - RS GEN PE MLT RESP DETAILS PC
good air movement/respirations non-labored/breath sounds equal/normal/airway patent/clear to auscultation bilaterally/no rales

## 2020-10-21 NOTE — H&P ADULT - HISTORY OF PRESENT ILLNESS
43 yo M, current smoker (attempting to quit), FHx of premature CAD (father MI @ 55, brother MI @ 40), with PMH of HTN, Type 2 DM, CAD s/p RENA mRCA 9/3/20 with residual pLAD 80%, who initially presented to Cardiologist Dr. Mcgill for a routine visit and had an abnormal CCTA prompting cardiac cath on 9/3/20. Since receiving a RENA to mRCA, he reports feeling well, has occasional palpitations and fatigue but denies CP, SOB, dizziness, LOC, orthopnea/PND, leg swelling.   In light of risk factors and known CAD with residual pLAD disease, patient presents for staged PCI.   cardiac cath 9/3/20: LMCA: Normal; LAD: Proximal 80% stenosis; LCX: Normal; RCA: Dominant. Mid 80% stenosis. LVEF 60%, LVEDP 10 mmHg   COVID negative 10/19 (HIE)  Cardiologist- Jose Mcgill  Pharmacy- [facility] [address] [place]  43 yo M, current smoker (attempting to quit), FHx of premature CAD (father MI @ 55, brother MI @ 40), with PMH of HTN, Type 2 DM, LAFB, CAD s/p RENA mRCA 9/3/20 with residual pLAD 80%, who initially presented to Cardiologist Dr. Mcgill for a routine visit and had an abnormal CCTA prompting cardiac cath on 9/3/20. Since receiving a RENA to mRCA, he reports feeling well, has occasional palpitations and fatigue but denies CP, SOB, dizziness, LOC, orthopnea/PND, leg swelling.   In light of risk factors and known CAD with residual pLAD disease, patient presents for staged PCI.   cardiac cath 9/3/20: LMCA- Normal; LAD- Proximal 80% stenosis; LCX- Normal; RCA- Dominant. Mid 80% stenosis (s/p RENA), LVEF 60%, LVEDP 10 mmHg   COVID negative 10/19 (HIE)  Cardiologist- Jose Mcgill  Pharmacy- [facility] [address] [place]  45 yo M, current smoker (attempting to quit), FHx of premature CAD (father MI @ 55, brother MI @ 40), with PMH of HTN, Type 2 DM, LAFB, CAD s/p RENA mRCA 9/3/20 with residual pLAD 80%, who initially presented to Cardiologist Dr. Mcgill for a routine visit and had an abnormal CCTA prompting cardiac cath on 9/3/20. Since receiving a RENA to mRCA, he reports feeling well, has occasional palpitations and fatigue but denies CP, SOB, dizziness, LOC, orthopnea/PND, leg swelling.   In light of risk factors and known CAD with residual pLAD disease, patient presents for staged PCI.   cardiac cath 9/3/20: LMCA- Normal; LAD- Proximal 80% stenosis; LCX- Normal; RCA- Dominant. Mid 80% stenosis (s/p RENA), LVEF 60%, LVEDP 10 mmHg

## 2020-10-21 NOTE — H&P ADULT - ASSESSMENT
45 yo M, current smoker (attempting to quit), FHx of premature CAD (father MI @ 55, brother MI @ 40), with PMH of HTN, Type 2 DM, LAFB, CAD s/p RENA mRCA 9/3/20 with residual pLAD 80%, who initially presented to Cardiologist Dr. Mcgill for a routine visit and had an abnormal CCTA prompting cardiac cath on 9/3/20. Since receiving a RENA to mRCA, he reports feeling well, has occasional palpitations and fatigue but denies CP, SOB, dizziness, LOC, orthopnea/PND, leg swelling. In light of risk factors and known CAD with residual pLAD disease, patient presents for staged PCI. cardiac cath 9/3/20: LMCA- Normal; LAD- Proximal 80% stenosis; LCX- Normal; RCA- Dominant. Mid 80% stenosis (s/p RENA), LVEF 60%, LVEDP 10 mmHg  - ASA II; Mallampati II.   - VSS.   - Pt is a candidate for moderate sedation.   - LOAD: pt takes ASA 81mg PO QD and Plavix 75mg PO QD at home with good compliance; he took his ASA 81mg PO x1 and Plavix 75mg PO x1 this morning - no load needed.   - FLUIDS: euvolemic on exam; LVEF normal per recent cath; patient started on NS 75cc/hr x4hrs for pre cath  hydration.     Risks & benefits of procedure and alternative therapy have been explained to the patient including but not limited to: allergic reaction, bleeding w/possible need for blood transfusion, infection, renal and vascular compromise, limb damage, arrhythmia, stroke, vessel dissection/perforation, Myocardial infarction, emergent CABG. Informed consent obtained and in chart.

## 2020-10-22 ENCOUNTER — INPATIENT (INPATIENT)
Facility: HOSPITAL | Age: 45
LOS: 0 days | Discharge: HOME | DRG: 247 | End: 2020-10-23
Attending: HOSPITALIST | Admitting: HOSPITALIST
Payer: COMMERCIAL

## 2020-10-22 LAB
A1C WITH ESTIMATED AVERAGE GLUCOSE RESULT: 8 % — HIGH (ref 4–5.6)
ALBUMIN SERPL ELPH-MCNC: 5.4 G/DL — HIGH (ref 3.3–5)
ALP SERPL-CCNC: 53 U/L — SIGNIFICANT CHANGE UP (ref 40–120)
ALT FLD-CCNC: 31 U/L — SIGNIFICANT CHANGE UP (ref 10–45)
ANION GAP SERPL CALC-SCNC: 16 MMOL/L — SIGNIFICANT CHANGE UP (ref 5–17)
APTT BLD: 28.9 SEC — SIGNIFICANT CHANGE UP (ref 27.5–35.5)
AST SERPL-CCNC: 22 U/L — SIGNIFICANT CHANGE UP (ref 10–40)
BASOPHILS # BLD AUTO: 0.05 K/UL — SIGNIFICANT CHANGE UP (ref 0–0.2)
BASOPHILS NFR BLD AUTO: 0.6 % — SIGNIFICANT CHANGE UP (ref 0–2)
BILIRUB SERPL-MCNC: 0.8 MG/DL — SIGNIFICANT CHANGE UP (ref 0.2–1.2)
BUN SERPL-MCNC: 17 MG/DL — SIGNIFICANT CHANGE UP (ref 7–23)
CALCIUM SERPL-MCNC: 10.1 MG/DL — SIGNIFICANT CHANGE UP (ref 8.4–10.5)
CHLORIDE SERPL-SCNC: 98 MMOL/L — SIGNIFICANT CHANGE UP (ref 96–108)
CHOLEST SERPL-MCNC: 126 MG/DL — SIGNIFICANT CHANGE UP
CK MB CFR SERPL CALC: 4.4 NG/ML — SIGNIFICANT CHANGE UP (ref 0–6.7)
CK SERPL-CCNC: 104 U/L — SIGNIFICANT CHANGE UP (ref 30–200)
CO2 SERPL-SCNC: 24 MMOL/L — SIGNIFICANT CHANGE UP (ref 22–31)
CREAT SERPL-MCNC: 0.67 MG/DL — SIGNIFICANT CHANGE UP (ref 0.5–1.3)
EOSINOPHIL # BLD AUTO: 0.16 K/UL — SIGNIFICANT CHANGE UP (ref 0–0.5)
EOSINOPHIL NFR BLD AUTO: 1.8 % — SIGNIFICANT CHANGE UP (ref 0–6)
ESTIMATED AVERAGE GLUCOSE: 183 MG/DL — HIGH (ref 68–114)
GLUCOSE SERPL-MCNC: 175 MG/DL — HIGH (ref 70–99)
HCT VFR BLD CALC: 48.9 % — SIGNIFICANT CHANGE UP (ref 39–50)
HDLC SERPL-MCNC: 26 MG/DL — LOW
HGB BLD-MCNC: 16.8 G/DL — SIGNIFICANT CHANGE UP (ref 13–17)
IMM GRANULOCYTES NFR BLD AUTO: 0.3 % — SIGNIFICANT CHANGE UP (ref 0–1.5)
INR BLD: 0.97 — SIGNIFICANT CHANGE UP (ref 0.88–1.16)
LIPID PNL WITH DIRECT LDL SERPL: 43 MG/DL — SIGNIFICANT CHANGE UP
LYMPHOCYTES # BLD AUTO: 1.93 K/UL — SIGNIFICANT CHANGE UP (ref 1–3.3)
LYMPHOCYTES # BLD AUTO: 21.6 % — SIGNIFICANT CHANGE UP (ref 13–44)
MCHC RBC-ENTMCNC: 30.7 PG — SIGNIFICANT CHANGE UP (ref 27–34)
MCHC RBC-ENTMCNC: 34.4 GM/DL — SIGNIFICANT CHANGE UP (ref 32–36)
MCV RBC AUTO: 89.2 FL — SIGNIFICANT CHANGE UP (ref 80–100)
MONOCYTES # BLD AUTO: 0.59 K/UL — SIGNIFICANT CHANGE UP (ref 0–0.9)
MONOCYTES NFR BLD AUTO: 6.6 % — SIGNIFICANT CHANGE UP (ref 2–14)
NEUTROPHILS # BLD AUTO: 6.19 K/UL — SIGNIFICANT CHANGE UP (ref 1.8–7.4)
NEUTROPHILS NFR BLD AUTO: 69.1 % — SIGNIFICANT CHANGE UP (ref 43–77)
NON HDL CHOLESTEROL: 100 MG/DL — SIGNIFICANT CHANGE UP
NRBC # BLD: 0 /100 WBCS — SIGNIFICANT CHANGE UP (ref 0–0)
PLATELET # BLD AUTO: 227 K/UL — SIGNIFICANT CHANGE UP (ref 150–400)
POTASSIUM SERPL-MCNC: 4.3 MMOL/L — SIGNIFICANT CHANGE UP (ref 3.5–5.3)
POTASSIUM SERPL-SCNC: 4.3 MMOL/L — SIGNIFICANT CHANGE UP (ref 3.5–5.3)
PROT SERPL-MCNC: 8.8 G/DL — HIGH (ref 6–8.3)
PROTHROM AB SERPL-ACNC: 11.7 SEC — SIGNIFICANT CHANGE UP (ref 10.6–13.6)
RBC # BLD: 5.48 M/UL — SIGNIFICANT CHANGE UP (ref 4.2–5.8)
RBC # FLD: 12 % — SIGNIFICANT CHANGE UP (ref 10.3–14.5)
SODIUM SERPL-SCNC: 138 MMOL/L — SIGNIFICANT CHANGE UP (ref 135–145)
TRIGL SERPL-MCNC: 284 MG/DL — HIGH
WBC # BLD: 8.95 K/UL — SIGNIFICANT CHANGE UP (ref 3.8–10.5)
WBC # FLD AUTO: 8.95 K/UL — SIGNIFICANT CHANGE UP (ref 3.8–10.5)

## 2020-10-22 PROCEDURE — 93458 L HRT ARTERY/VENTRICLE ANGIO: CPT | Mod: 26,59

## 2020-10-22 PROCEDURE — 92928 PRQ TCAT PLMT NTRAC ST 1 LES: CPT | Mod: LD

## 2020-10-22 PROCEDURE — 99223 1ST HOSP IP/OBS HIGH 75: CPT

## 2020-10-22 PROCEDURE — 99232 SBSQ HOSP IP/OBS MODERATE 35: CPT

## 2020-10-22 PROCEDURE — 92978 ENDOLUMINL IVUS OCT C 1ST: CPT | Mod: 26,LD

## 2020-10-22 PROCEDURE — 93010 ELECTROCARDIOGRAM REPORT: CPT

## 2020-10-22 RX ORDER — CLOPIDOGREL BISULFATE 75 MG/1
75 TABLET, FILM COATED ORAL DAILY
Refills: 0 | Status: DISCONTINUED | OUTPATIENT
Start: 2020-10-23 | End: 2020-10-23

## 2020-10-22 RX ORDER — ASPIRIN/CALCIUM CARB/MAGNESIUM 324 MG
81 TABLET ORAL EVERY 24 HOURS
Refills: 0 | Status: DISCONTINUED | OUTPATIENT
Start: 2020-10-23 | End: 2020-10-23

## 2020-10-22 RX ORDER — ATORVASTATIN CALCIUM 80 MG/1
80 TABLET, FILM COATED ORAL ONCE
Refills: 0 | Status: DISCONTINUED | OUTPATIENT
Start: 2020-10-22 | End: 2020-10-22

## 2020-10-22 RX ORDER — ATORVASTATIN CALCIUM 80 MG/1
80 TABLET, FILM COATED ORAL AT BEDTIME
Refills: 0 | Status: DISCONTINUED | OUTPATIENT
Start: 2020-10-22 | End: 2020-10-23

## 2020-10-22 RX ORDER — SODIUM CHLORIDE 9 MG/ML
500 INJECTION INTRAMUSCULAR; INTRAVENOUS; SUBCUTANEOUS
Refills: 0 | Status: DISCONTINUED | OUTPATIENT
Start: 2020-10-22 | End: 2020-10-23

## 2020-10-22 RX ORDER — SODIUM CHLORIDE 9 MG/ML
500 INJECTION INTRAMUSCULAR; INTRAVENOUS; SUBCUTANEOUS
Refills: 0 | Status: DISCONTINUED | OUTPATIENT
Start: 2020-10-22 | End: 2020-10-22

## 2020-10-22 RX ORDER — LISINOPRIL 2.5 MG/1
20 TABLET ORAL EVERY 24 HOURS
Refills: 0 | Status: DISCONTINUED | OUTPATIENT
Start: 2020-10-23 | End: 2020-10-23

## 2020-10-22 RX ADMIN — SODIUM CHLORIDE 75 MILLILITER(S): 9 INJECTION INTRAMUSCULAR; INTRAVENOUS; SUBCUTANEOUS at 17:29

## 2020-10-22 RX ADMIN — ATORVASTATIN CALCIUM 80 MILLIGRAM(S): 80 TABLET, FILM COATED ORAL at 21:01

## 2020-10-22 RX ADMIN — SODIUM CHLORIDE 75 MILLILITER(S): 9 INJECTION INTRAMUSCULAR; INTRAVENOUS; SUBCUTANEOUS at 14:09

## 2020-10-22 NOTE — CONSULT NOTE ADULT - SUBJECTIVE AND OBJECTIVE BOX
Preventive Cardiology Consultation Note    CHIEF COMPLAINT: s/p cardiac catheterization requiring cardiovascular prevention optimization and education    HISTORY OF PRESENT ILLNESS: 45 yo M, current smoker (attempting to quit), FHx of premature CAD and a PMHx of HTN, Type 2 DM, LAFB, CAD s/p RENA mRCA 9/3/20 with residual pLAD 80%, who initially presented to Cardiologist Dr. Mcgill for a routine visit and had an abnormal CCTA prompting cardiac cath on 9/3/20. Patient presents today for staged PCI.     Review of systems otherwise negative.     Lifestyle History:  Mediterranean Diet Score (9 question survey) was 5.   (8-9: optimal, 6-7: near-optimal, 4-5: suboptimal, 0-3: markedly suboptimal)  Exercise: Patient reports exercising at a moderate level for <30 minutes per week.   Smoking: Current smoker (1 PPD x 25 years; he has recently cut down and is trying to quit).  Stress: Patient denies any stress.     PAST MEDICAL & SURGICAL HISTORY:  Coronary artery disease    Hypertension    Hyperlipidemia    Type II diabetes mellitus    No significant past surgical history      FAMILY HISTORY:   CAD - myocardial infarction, Brother MI @ 41 (CABG), Father @ 55, Grandfather.    Allergies:   No Known Allergies      HOME MEDICATIONS:   Jardiance 25 mg oral tablet: 1 tab(s) orally once a day (in the morning) (22 Oct 2020 14:02)  lisinopril 20 mg oral tablet: 1 tab(s) orally once a day (22 Oct 2020 14:02)  metFORMIN 1000 mg oral tablet: 1 tab(s) orally 2 times a day (22 Oct 2020 14:02)  Ozempic (0.25 mg or 0.5 mg dose) 2 mg/1.5 mL subcutaneous solution: 0.5 milligram(s) subcutaneous once a week (22 Oct 2020 14:02)  rosuvastatin 40 mg oral tablet: 1 tab(s) orally once a day (at bedtime) (22 Oct 2020 14:02)      PHYSICAL EXAM:  T(C): 36 (10-23-20 @ 10:04), Max: 36.8 (10-23-20 @ 05:13)  T(F): 96.8 (10-23-20 @ 10:04), Max: 98.2 (10-23-20 @ 05:13)  HR: 75 (10-23-20 @ 09:40) (67 - 79)  BP: 119/70 (10-23-20 @ 09:40) (109/66 - 123/68)  RR: 18 (10-23-20 @ 09:40) (18 - 24)  SpO2: 95% (10-23-20 @ 09:40) (94% - 97%)  Height (cm): 190.5 (10-22 @ 14:07)  Weight (kg): 113.4 (10-22 @ 14:07)  BMI (kg/m2): 31.2 (10-22 @ 14:07)  	  Gen- awake, conversive  Head-NCAT; eyes: no corneal arcus noted b/l; no xanthelasmas   Neck- no thyromegaly, no cervical LAD, no JVD, no carotid bruit b/l  Respiratory- good air entry b/l, no WRR  Cardiovascular- S1S2, RRR, no MRG appr, no LE edema b/l, distal pulses 2+ b/l  Gastrointestinal- no HSM, no masses  Neurology- moves all extremities, no focal deficits  Psych- normal affect, non-depressed mood  Skin- no lesions, no rashes, no xanthomas     LABS:	                        14.7   9.01  )-----------( 180      ( 23 Oct 2020 06:24 )             42.7     10-23    138  |  100  |  16  ----------------------------<  158<H>  4.2   |  25  |  0.58    Ca    9.6      23 Oct 2020 06:24  Mg     2.2     10-23    TPro  6.9  /  Alb  4.2  /  TBili  0.8  /  DBili  x   /  AST  53<H>  /  ALT  30  /  AlkPhos  44  10-23      Cholesterol, Serum: 126 mg/dL (10-22 @ 13:43)  HDL Cholesterol, Serum: 26 mg/dL (10-22 @ 13:43)  Triglycerides, Serum: 284 mg/dL (10-22 @ 13:43)  Direct LDL: 43 mg/dL (10-22 @ 13:43)      ASSESSMENT/RECOMMENDATIONS: 	  Patient's dietary, exercise and overall lifestyle habits were reviewed. The concept of atherosclerosis and its systemic nature was discussed with a focus on the need to get all cardiovascular risk factors to goal. At this time, I would like to make the following recommendations to optimize atherosclerotic risk factors.     RECOMMENDATIONS:   Anti-platelet Therapy: DAPT per interventionalist recommendation.   Lipid Therapy: Patient is currently taking rosuvastatin 40mg daily and is compliant and tolerating it well. We believe this is an appropriate medication at this time, as his current LDL-C is at goal and lifestyle modifications will likely benefit him further.   Hypertension: Blood pressures during this stay were well-controlled.   Mediterranean Diet Score is 5. Some suggestions include continue incorporating 2 or more servings per day of vegetables, fruits, and whole grains. Increase intake of fish and legumes/beans to 2 or more servings per week. Aim to increase intake of healthy fats, such as olive oil and avocados, and have a handful of nuts/seeds most days. Reduce red/processed meat consumption to 2 or fewer times per week.   Exercise: Recommended gradually increasing activity to 30-45 minutes most days of the week once cleared by referring cardiologist. Cardiac rehab might benefit this patient and is covered by major insurance plans (other than co-pays), please refer.   Medication Adherence: Patient has no issues with adherence at this time.   Smoking: Smoking cessation was strongly encouraged and discussed with the patient. We suggested picking a day in the future that will be the planned quit date. We recommend following up with his PCP for further assistance, if needed for nicotine replacement therapy. The risks to overall health if he continues to smoke were discussed, and patient expressed understanding.   Obesity/Overweight: The patient was advised about specific mechanisms such as reduced portions and increased activity for efforts toward weight loss.   Glucometabolic State: HbA1c today was 8.0%. He is on an excellent medication regimen, as Jardiance and Ozempic have cardiovascular benefits as well as glucose control, so ideally lifestyle modifications will help lower his A1c and he should continue to follow up with his endocrinologist to see if adjustments are needed.   Sleep Apnea: The patient is at low risk for sleep apnea.   Psychological Stress: The patient appears to be coping with stressors well at this time.     Thank you for the opportunity to see this patient. Please feel free to contact Prevention if there are any questions, or if you feel that your patient would benefit from continued follow-up visits with the Program.    Natalya Major, [facility]-BC  Cardiovascular Prevention     Queta Nix MD  System Director, Cardiovascular Prevention

## 2020-10-23 ENCOUNTER — TRANSCRIPTION ENCOUNTER (OUTPATIENT)
Age: 45
End: 2020-10-23

## 2020-10-23 VITALS — TEMPERATURE: 97 F

## 2020-10-23 LAB
ALBUMIN SERPL ELPH-MCNC: 4.2 G/DL — SIGNIFICANT CHANGE UP (ref 3.3–5)
ALP SERPL-CCNC: 44 U/L — SIGNIFICANT CHANGE UP (ref 40–120)
ALT FLD-CCNC: 30 U/L — SIGNIFICANT CHANGE UP (ref 10–45)
ANION GAP SERPL CALC-SCNC: 13 MMOL/L — SIGNIFICANT CHANGE UP (ref 5–17)
AST SERPL-CCNC: 53 U/L — HIGH (ref 10–40)
BILIRUB SERPL-MCNC: 0.8 MG/DL — SIGNIFICANT CHANGE UP (ref 0.2–1.2)
BUN SERPL-MCNC: 16 MG/DL — SIGNIFICANT CHANGE UP (ref 7–23)
CALCIUM SERPL-MCNC: 9.6 MG/DL — SIGNIFICANT CHANGE UP (ref 8.4–10.5)
CHLORIDE SERPL-SCNC: 100 MMOL/L — SIGNIFICANT CHANGE UP (ref 96–108)
CK MB CFR SERPL CALC: 30.8 NG/ML — HIGH (ref 0–6.7)
CK SERPL-CCNC: 425 U/L — HIGH (ref 30–200)
CO2 SERPL-SCNC: 25 MMOL/L — SIGNIFICANT CHANGE UP (ref 22–31)
CREAT SERPL-MCNC: 0.58 MG/DL — SIGNIFICANT CHANGE UP (ref 0.5–1.3)
GLUCOSE SERPL-MCNC: 158 MG/DL — HIGH (ref 70–99)
HCT VFR BLD CALC: 42.7 % — SIGNIFICANT CHANGE UP (ref 39–50)
HGB BLD-MCNC: 14.7 G/DL — SIGNIFICANT CHANGE UP (ref 13–17)
MAGNESIUM SERPL-MCNC: 2.2 MG/DL — SIGNIFICANT CHANGE UP (ref 1.6–2.6)
MCHC RBC-ENTMCNC: 30.1 PG — SIGNIFICANT CHANGE UP (ref 27–34)
MCHC RBC-ENTMCNC: 34.4 GM/DL — SIGNIFICANT CHANGE UP (ref 32–36)
MCV RBC AUTO: 87.5 FL — SIGNIFICANT CHANGE UP (ref 80–100)
NRBC # BLD: 0 /100 WBCS — SIGNIFICANT CHANGE UP (ref 0–0)
PLATELET # BLD AUTO: 180 K/UL — SIGNIFICANT CHANGE UP (ref 150–400)
POTASSIUM SERPL-MCNC: 4.2 MMOL/L — SIGNIFICANT CHANGE UP (ref 3.5–5.3)
POTASSIUM SERPL-SCNC: 4.2 MMOL/L — SIGNIFICANT CHANGE UP (ref 3.5–5.3)
PROT SERPL-MCNC: 6.9 G/DL — SIGNIFICANT CHANGE UP (ref 6–8.3)
RBC # BLD: 4.88 M/UL — SIGNIFICANT CHANGE UP (ref 4.2–5.8)
RBC # FLD: 12.1 % — SIGNIFICANT CHANGE UP (ref 10.3–14.5)
SODIUM SERPL-SCNC: 138 MMOL/L — SIGNIFICANT CHANGE UP (ref 135–145)
TROPONIN T SERPL-MCNC: 0.82 NG/ML — CRITICAL HIGH (ref 0–0.01)
WBC # BLD: 9.01 K/UL — SIGNIFICANT CHANGE UP (ref 3.8–10.5)
WBC # FLD AUTO: 9.01 K/UL — SIGNIFICANT CHANGE UP (ref 3.8–10.5)

## 2020-10-23 PROCEDURE — 99239 HOSP IP/OBS DSCHRG MGMT >30: CPT

## 2020-10-23 RX ORDER — GLUCAGON INJECTION, SOLUTION 0.5 MG/.1ML
1 INJECTION, SOLUTION SUBCUTANEOUS ONCE
Refills: 0 | Status: DISCONTINUED | OUTPATIENT
Start: 2020-10-23 | End: 2020-10-23

## 2020-10-23 RX ORDER — INSULIN LISPRO 100/ML
VIAL (ML) SUBCUTANEOUS
Refills: 0 | Status: DISCONTINUED | OUTPATIENT
Start: 2020-10-23 | End: 2020-10-23

## 2020-10-23 RX ORDER — DEXTROSE 50 % IN WATER 50 %
25 SYRINGE (ML) INTRAVENOUS ONCE
Refills: 0 | Status: DISCONTINUED | OUTPATIENT
Start: 2020-10-23 | End: 2020-10-23

## 2020-10-23 RX ORDER — DEXTROSE 50 % IN WATER 50 %
15 SYRINGE (ML) INTRAVENOUS ONCE
Refills: 0 | Status: DISCONTINUED | OUTPATIENT
Start: 2020-10-23 | End: 2020-10-23

## 2020-10-23 RX ORDER — DEXTROSE 50 % IN WATER 50 %
12.5 SYRINGE (ML) INTRAVENOUS ONCE
Refills: 0 | Status: DISCONTINUED | OUTPATIENT
Start: 2020-10-23 | End: 2020-10-23

## 2020-10-23 RX ORDER — SODIUM CHLORIDE 9 MG/ML
1000 INJECTION, SOLUTION INTRAVENOUS
Refills: 0 | Status: DISCONTINUED | OUTPATIENT
Start: 2020-10-23 | End: 2020-10-23

## 2020-10-23 RX ADMIN — CLOPIDOGREL BISULFATE 75 MILLIGRAM(S): 75 TABLET, FILM COATED ORAL at 10:39

## 2020-10-23 RX ADMIN — LISINOPRIL 20 MILLIGRAM(S): 2.5 TABLET ORAL at 10:39

## 2020-10-23 RX ADMIN — Medication 81 MILLIGRAM(S): at 10:39

## 2020-10-23 NOTE — DISCHARGE NOTE PROVIDER - HOSPITAL COURSE
43 yo M, current smoker (attempting to quit), FHx of premature CAD (father MI @ 55, brother MI @ 40), with PMH of HTN, Type 2 DM, LAFB, CAD s/p RENA mRCA 9/3/20 with residual pLAD 80%, who initially presented to Cardiologist Dr. Mcgill for a routine visit and had an abnormal CCTA prompting cardiac cath on 9/3/20. Since receiving a RENA to mRCA, he reports feeling well, has occasional palpitations and fatigue but denies CP, SOB, dizziness, LOC, orthopnea/PND, leg swelling. Cardiac Cath 9/3/20: LMCA- Normal; LAD- Proximal 80% stenosis; LCX- Normal; RCA- Dominant. Mid 80% stenosis (s/p RENA), LVEF 60%, LVEDP 10 mmHg.  In light of risk factors and known CAD with residual pLAD disease, patient presents for staged PCI.     Pt is s/p cardiac cath 10/22/2020 w/ RENA to pLAD; LM normal, LCx normal, mRCA stent widely patent. Right radial access was used for procedure w/ successful hemostasis achieved post-procedure.     Today, patient seen and evaluated at bedside. Physical exam benign, right radial access site stable without signs of hematoma or bleeding, no events on telemetry over night, and labs unremarkable. Reviewed labs, vitals, telemetry, and hospital course with cardiac telemetry attending and patient deemed stable for discharge home.     DAPT: Aspirin 81mg PO QD and Plavix 75mg PO QD  Statin Therapy: Rosuvastatin 40mg PO QHS  F/U Cardiologist: Dr. Jose Mcgill    Discharge instructions reviewed with patient and patient verbalizes understanding. All questions asked, and all necessary prescriptions have been sent to patient's preferred pharmacy.      43 yo M, current smoker (attempting to quit), FHx of premature CAD (father MI @ 55, brother MI @ 40), with PMH of HTN, Type 2 DM, LAFB, CAD s/p RENA mRCA 9/3/20 with residual pLAD 80%, who initially presented to Cardiologist Dr. Mcgill for a routine visit and had an abnormal CCTA prompting cardiac cath on 9/3/20. Since receiving a RENA to mRCA, he reports feeling well, has occasional palpitations and fatigue but denies CP, SOB, dizziness, LOC, orthopnea/PND, leg swelling. Cardiac Cath 9/3/20: LMCA- Normal; LAD- Proximal 80% stenosis; LCX- Normal; RCA- Dominant. Mid 80% stenosis (s/p RENA), LVEF 60%, LVEDP 10 mmHg.  In light of risk factors and known CAD with residual pLAD disease, patient presents for staged PCI.     Pt is s/p cardiac cath 10/22/2020 w/ RENA to pLAD; LM normal, LCx normal, mRCA stent widely patent. Right radial access was used for procedure w/ successful hemostasis achieved post-procedure. Right radial site stable with no bleeding, hematoma, and pulses intact.      Today, patient seen and evaluated at bedside. Physical exam benign, right radial access site stable without signs of hematoma or bleeding, no events on telemetry over night, and labs unremarkable. Reviewed labs, vitals, telemetry, and hospital course with cardiac telemetry attending and patient deemed stable for discharge home.  Patient continued on home Aspirin 81mg PO QD and Plavix 75mg PO QD, Rosuvastatin 40mg PO QHS, and lisinopril 20mg QD.  No new prescriptions on discharge and patient states he has refills of all his medications.  Patient will follow up with Dr. Mcgill in 1-2 weeks.     ** OF NOTE patient was enrolled in the [employer] Trial.  A CK/CKMB, and Troponin level was added on to AM labs.

## 2020-10-23 NOTE — DISCHARGE NOTE PROVIDER - NSDCMRMEDTOKEN_GEN_ALL_CORE_FT
aspirin 81 mg oral delayed release tablet: 1 tab(s) orally once a day  clopidogrel 75 mg oral tablet: 1 tab(s) orally once a day  Jardiance 25 mg oral tablet: 1 tab(s) orally once a day (in the morning)  lisinopril 20 mg oral tablet: 1 tab(s) orally once a day  metFORMIN 1000 mg oral tablet: 1 tab(s) orally 2 times a day  Ozempic (0.25 mg or 0.5 mg dose) 2 mg/1.5 mL subcutaneous solution: 0.5 milligram(s) subcutaneous once a week  rosuvastatin 40 mg oral tablet: 1 tab(s) orally once a day (at bedtime)

## 2020-10-23 NOTE — DISCHARGE NOTE PROVIDER - CARE PROVIDER_API CALL
Jose Mcgill  CARDIOVASCULAR DISEASE  [address] [facility], [place] [address]  Phone: [phone]  Fax: [phone]  Follow Up Time:

## 2020-10-23 NOTE — DISCHARGE NOTE PROVIDER - NSDCCPCAREPLAN_GEN_ALL_CORE_FT
PRINCIPAL DISCHARGE DIAGNOSIS  Diagnosis: CAD (coronary artery disease)  Assessment and Plan of Treatment: You underwent a cardiac angiogram and received a stent to the proximal Left Anteriod Descending artery (pLAD). PLEASE CONTINUE ASPIRIN 81MG DAILY AND PLAVIX 75MG DAILY. DO NOT STOP THESE MEDICATIONS FOR ANY REASON AS THEY ARE KEEPING YOUR STENT OPEN AND PREVENTING A HEART ATTACK. Avoid strenuous activity or heavy lifting for the next five days. Do not take a bath or swim for the next five days; you may shower. For any bleeding or hematoma formation (hardened blood collection under the skin) at the access site of RIGHT WRIST please hold pressure and go to the emergency room. Please follow up with Dr. Mcgill in 1-2 weeks. For recurrent chest pain, please call your doctor or go to the emergency room.      SECONDARY DISCHARGE DIAGNOSES  Diagnosis: DM (diabetes mellitus)  Assessment and Plan of Treatment: Please continue medications as listed for diabetes. Maintain a low carbohydrate, low sugar diet. Check for your blood sugars regularly.   - Please DO NOT RESTART Metformin 1000mg by mouth twice daily UNTIL SUNDAY, OCTOBER 25, 2020.    Diagnosis: HTN (hypertension)  Assessment and Plan of Treatment: Please continue medications as listed to keep your blood pressure controlled. For blood pressure that is too high or too low please see your doctor or go to the emergency room as necessary.    Diagnosis: HLD (hyperlipidemia)  Assessment and Plan of Treatment: Please continue Rosuvastatin 40mg by mouth daily at bedtime to keep your cholesterol low. High cholesterol contributes to heart disease.

## 2020-10-23 NOTE — DISCHARGE NOTE PROVIDER - NSDCFUADDAPPT_GEN_ALL_CORE_FT
(1) Please follow up with your cardiologist, Dr. Mcgill, within 1-2 weeks of discharge home form the hospital.

## 2020-10-28 DIAGNOSIS — I25.10 ATHEROSCLEROTIC HEART DISEASE OF NATIVE CORONARY ARTERY WITHOUT ANGINA PECTORIS: ICD-10-CM

## 2020-10-28 DIAGNOSIS — Z82.49 FAMILY HISTORY OF ISCHEMIC HEART DISEASE AND OTHER DISEASES OF THE CIRCULATORY SYSTEM: ICD-10-CM

## 2020-10-28 DIAGNOSIS — Z79.84 LONG TERM (CURRENT) USE OF ORAL HYPOGLYCEMIC DRUGS: ICD-10-CM

## 2020-10-28 DIAGNOSIS — Z95.5 PRESENCE OF CORONARY ANGIOPLASTY IMPLANT AND GRAFT: ICD-10-CM

## 2020-10-28 DIAGNOSIS — I10 ESSENTIAL (PRIMARY) HYPERTENSION: ICD-10-CM

## 2020-10-28 DIAGNOSIS — E78.5 HYPERLIPIDEMIA, UNSPECIFIED: ICD-10-CM

## 2020-10-28 DIAGNOSIS — Z79.82 LONG TERM (CURRENT) USE OF ASPIRIN: ICD-10-CM

## 2020-10-28 DIAGNOSIS — Z80.1 FAMILY HISTORY OF MALIGNANT NEOPLASM OF TRACHEA, BRONCHUS AND LUNG: ICD-10-CM

## 2020-10-28 DIAGNOSIS — Z00.6 ENCOUNTER FOR EXAMINATION FOR NORMAL COMPARISON AND CONTROL IN CLINICAL RESEARCH PROGRAM: ICD-10-CM

## 2020-10-28 DIAGNOSIS — E11.9 TYPE 2 DIABETES MELLITUS WITHOUT COMPLICATIONS: ICD-10-CM

## 2020-10-28 DIAGNOSIS — F17.210 NICOTINE DEPENDENCE, CIGARETTES, UNCOMPLICATED: ICD-10-CM

## 2020-10-28 DIAGNOSIS — I44.4 LEFT ANTERIOR FASCICULAR BLOCK: ICD-10-CM

## 2020-11-05 PROBLEM — I25.10 ATHEROSCLEROTIC HEART DISEASE OF NATIVE CORONARY ARTERY WITHOUT ANGINA PECTORIS: Chronic | Status: ACTIVE | Noted: 2020-10-21

## 2020-11-05 PROCEDURE — C1769: CPT

## 2020-11-05 PROCEDURE — 82550 ASSAY OF CK (CPK): CPT

## 2020-11-05 PROCEDURE — 83036 HEMOGLOBIN GLYCOSYLATED A1C: CPT

## 2020-11-05 PROCEDURE — C1874: CPT

## 2020-11-05 PROCEDURE — 82553 CREATINE MB FRACTION: CPT

## 2020-11-05 PROCEDURE — 84484 ASSAY OF TROPONIN QUANT: CPT

## 2020-11-05 PROCEDURE — C1894: CPT

## 2020-11-05 PROCEDURE — 80061 LIPID PANEL: CPT

## 2020-11-05 PROCEDURE — 93005 ELECTROCARDIOGRAM TRACING: CPT

## 2020-11-05 PROCEDURE — 36415 COLL VENOUS BLD VENIPUNCTURE: CPT

## 2020-11-05 PROCEDURE — C1753: CPT

## 2020-11-05 PROCEDURE — C1725: CPT

## 2020-11-05 PROCEDURE — 85730 THROMBOPLASTIN TIME PARTIAL: CPT

## 2020-11-05 PROCEDURE — 83735 ASSAY OF MAGNESIUM: CPT

## 2020-11-05 PROCEDURE — 85027 COMPLETE CBC AUTOMATED: CPT

## 2020-11-05 PROCEDURE — 80053 COMPREHEN METABOLIC PANEL: CPT

## 2020-11-05 PROCEDURE — 85610 PROTHROMBIN TIME: CPT

## 2020-11-05 PROCEDURE — 85025 COMPLETE CBC W/AUTO DIFF WBC: CPT

## 2020-11-05 PROCEDURE — C1887: CPT

## 2020-11-05 PROCEDURE — 82962 GLUCOSE BLOOD TEST: CPT

## 2020-11-11 ENCOUNTER — APPOINTMENT (OUTPATIENT)
Dept: HEART AND VASCULAR | Facility: CLINIC | Age: 45
End: 2020-11-11
Payer: COMMERCIAL

## 2020-11-11 VITALS
WEIGHT: 248 LBS | SYSTOLIC BLOOD PRESSURE: 140 MMHG | DIASTOLIC BLOOD PRESSURE: 80 MMHG | OXYGEN SATURATION: 97 % | TEMPERATURE: 98 F | BODY MASS INDEX: 31 KG/M2 | HEART RATE: 87 BPM

## 2020-11-11 DIAGNOSIS — I44.60 UNSPECIFIED FASCICULAR BLOCK: ICD-10-CM

## 2020-11-11 PROCEDURE — 99072 ADDL SUPL MATRL&STAF TM PHE: CPT

## 2020-11-11 PROCEDURE — 93000 ELECTROCARDIOGRAM COMPLETE: CPT

## 2020-11-11 PROCEDURE — 99214 OFFICE O/P EST MOD 30 MIN: CPT

## 2020-11-14 PROBLEM — I44.60 BUNDLE BRANCH BLOCK, LEFT HEMIBLOCK: Status: ACTIVE | Noted: 2020-02-09

## 2020-11-14 NOTE — ASSESSMENT
[FreeTextEntry1] : s/p multivessel revascularization\par \par Poorly controlled diabetes \par \par chronic LAFB\par \par

## 2020-11-14 NOTE — DISCUSSION/SUMMARY
[Bundle Branch Block] : ~T bundle branch block [Coronary Artery Disease] : coronary artery disease [Stable] : stable [Responding to Treatment] : responding to treatment [None] : none [Dietary Modification] : dietary modification [Weight Reduction] : weight reduction [___ Month(s)] : [unfilled] month(s) [With Me] : with me [FreeTextEntry1] : Counseled regarding diet \par \par Continue aspirin and plavix for one year \par \par LDL at target, below 70 mg/dL \par \par Answered all questions \par

## 2020-11-14 NOTE — PHYSICAL EXAM
[General Appearance - Well Developed] : well developed [Normal Appearance] : normal appearance [Well Groomed] : well groomed [General Appearance - Well Nourished] : well nourished [No Deformities] : no deformities [General Appearance - In No Acute Distress] : no acute distress [Normal Conjunctiva] : the conjunctiva exhibited no abnormalities [Eyelids - No Xanthelasma] : the eyelids demonstrated no xanthelasmas [Normal Jugular Venous A Waves Present] : normal jugular venous A waves present [Normal Jugular Venous V Waves Present] : normal jugular venous V waves present [No Jugular Venous Morgan A Waves] : no jugular venous morgan A waves [Respiration, Rhythm And Depth] : normal respiratory rhythm and effort [Exaggerated Use Of Accessory Muscles For Inspiration] : no accessory muscle use [Auscultation Breath Sounds / Voice Sounds] : lungs were clear to auscultation bilaterally [Heart Rate And Rhythm] : heart rate and rhythm were normal [Heart Sounds] : normal S1 and S2 [Murmurs] : no murmurs present [Edema] : no peripheral edema present [Abnormal Walk] : normal gait [Gait - Sufficient For Exercise Testing] : the gait was sufficient for exercise testing [Nail Clubbing] : no clubbing of the fingernails [Cyanosis, Localized] : no localized cyanosis [Petechial Hemorrhages (___cm)] : no petechial hemorrhages [FreeTextEntry1] : atrophic hand muscles  [Skin Color & Pigmentation] : normal skin color and pigmentation [Skin Turgor] : normal skin turgor [] : no rash [No Venous Stasis] : no venous stasis [Skin Lesions] : no skin lesions [No Skin Ulcers] : no skin ulcer [No Xanthoma] : no  xanthoma was observed [Oriented To Time, Place, And Person] : oriented to person, place, and time [Affect] : the affect was normal [Mood] : the mood was normal [No Anxiety] : not feeling anxious

## 2020-11-14 NOTE — HISTORY OF PRESENT ILLNESS
[FreeTextEntry1] : Recently started Ozempic and metformin was increased to 1,000 mg bid . He also uses Humalog insulin 12 units q am \par \par Sent home on aspirin 81mg and plavix 75mg \par \par denies chest discomfort , but admits energy level is somewhat improved post angioplasty of proximal LAD. The mid RCA stenosis was successfully stented previously and remains widely patient \par \par EKG shows stable  NSR  83 bpm with  LAFB without ectopy, ischemia or clear evidence of LVH . There is a moderate IVCD \par \par He had his flu vaccine 1.5 months ago \par \par

## 2020-11-14 NOTE — REASON FOR VISIT
[Follow-Up - From Hospitalization] : follow-up of a recent hospitalization for [Abnormal ECG] : an abnormal ECG [Coronary Artery Disease] : coronary artery disease [Hyperlipidemia] : hyperlipidemia [FreeTextEntry2] : s/p staged angioplasty with drug eluting stents  [FreeTextEntry1] : 45  year old male  with poorly controlled type 2 diabetes , HTN  has LAFB on resting EKG and a family hx of premature CAD. \par \par  He  had  abnormal   nuclear stress test March 2020 was   and  was referred for cardiac catheterization only after a coronary CTA  was performed showing significant multivessel CAD. \par \par He is being treated for hypercortisolism  with Korlyn

## 2020-11-14 NOTE — REVIEW OF SYSTEMS
[Dyspnea on exertion] : dyspnea during exertion [Numbness (Hypesthesia)] : numbness [Tingling (Paresthesia)] : tingling [Negative] : Heme/Lymph

## 2021-02-22 ENCOUNTER — RX RENEWAL (OUTPATIENT)
Age: 46
End: 2021-02-22

## 2021-02-26 ENCOUNTER — APPOINTMENT (OUTPATIENT)
Dept: HEART AND VASCULAR | Facility: CLINIC | Age: 46
End: 2021-02-26
Payer: COMMERCIAL

## 2021-02-26 VITALS
HEART RATE: 85 BPM | WEIGHT: 245 LBS | HEIGHT: 75 IN | RESPIRATION RATE: 14 BRPM | TEMPERATURE: 98.1 F | SYSTOLIC BLOOD PRESSURE: 120 MMHG | DIASTOLIC BLOOD PRESSURE: 80 MMHG | OXYGEN SATURATION: 99 % | BODY MASS INDEX: 30.46 KG/M2

## 2021-02-26 DIAGNOSIS — E09.43 DRUG OR CHEMICAL INDUCED DIABETES MELLITUS WITH NEUROLOGICAL COMPLICATIONS WITH DIABETIC AUTONOMIC (POLY)NEUROPATHY: ICD-10-CM

## 2021-02-26 DIAGNOSIS — I44.0 ATRIOVENTRICULAR BLOCK, FIRST DEGREE: ICD-10-CM

## 2021-02-26 PROCEDURE — 99072 ADDL SUPL MATRL&STAF TM PHE: CPT

## 2021-02-26 PROCEDURE — 36415 COLL VENOUS BLD VENIPUNCTURE: CPT

## 2021-02-26 PROCEDURE — 99214 OFFICE O/P EST MOD 30 MIN: CPT

## 2021-02-26 PROCEDURE — 93000 ELECTROCARDIOGRAM COMPLETE: CPT

## 2021-02-27 PROBLEM — I44.0 1ST DEGREE AV BLOCK: Status: ACTIVE | Noted: 2020-09-09

## 2021-02-27 LAB
ALBUMIN SERPL ELPH-MCNC: 4.9 G/DL
ALP BLD-CCNC: 63 U/L
ALT SERPL-CCNC: 33 U/L
ANION GAP SERPL CALC-SCNC: 13 MMOL/L
APPEARANCE: CLEAR
AST SERPL-CCNC: 19 U/L
BASOPHILS # BLD AUTO: 0.05 K/UL
BASOPHILS NFR BLD AUTO: 0.7 %
BILIRUB SERPL-MCNC: 0.9 MG/DL
BILIRUBIN URINE: NEGATIVE
BLOOD URINE: NEGATIVE
BUN SERPL-MCNC: 18 MG/DL
CALCIUM SERPL-MCNC: 10.2 MG/DL
CHLORIDE SERPL-SCNC: 95 MMOL/L
CHOLEST SERPL-MCNC: 162 MG/DL
CO2 SERPL-SCNC: 27 MMOL/L
COLOR: YELLOW
CREAT SERPL-MCNC: 0.82 MG/DL
CREAT SPEC-SCNC: 132 MG/DL
EOSINOPHIL # BLD AUTO: 0.18 K/UL
EOSINOPHIL NFR BLD AUTO: 2.4 %
ESTIMATED AVERAGE GLUCOSE: 301 MG/DL
GLUCOSE QUALITATIVE U: ABNORMAL
GLUCOSE SERPL-MCNC: 271 MG/DL
HBA1C MFR BLD HPLC: 12.1 %
HCT VFR BLD CALC: 49.4 %
HDLC SERPL-MCNC: 27 MG/DL
HGB BLD-MCNC: 16.1 G/DL
IMM GRANULOCYTES NFR BLD AUTO: 0.3 %
KETONES URINE: NORMAL
LDLC SERPL CALC-MCNC: NORMAL MG/DL
LEUKOCYTE ESTERASE URINE: NEGATIVE
LYMPHOCYTES # BLD AUTO: 2.02 K/UL
LYMPHOCYTES NFR BLD AUTO: 26.9 %
MAGNESIUM SERPL-MCNC: 2 MG/DL
MAN DIFF?: NORMAL
MCHC RBC-ENTMCNC: 30.1 PG
MCHC RBC-ENTMCNC: 32.6 GM/DL
MCV RBC AUTO: 92.5 FL
MICROALBUMIN 24H UR DL<=1MG/L-MCNC: 7.3 MG/DL
MICROALBUMIN/CREAT 24H UR-RTO: 55 MG/G
MONOCYTES # BLD AUTO: 0.51 K/UL
MONOCYTES NFR BLD AUTO: 6.8 %
NEUTROPHILS # BLD AUTO: 4.72 K/UL
NEUTROPHILS NFR BLD AUTO: 62.9 %
NITRITE URINE: NEGATIVE
NONHDLC SERPL-MCNC: 135 MG/DL
NT-PROBNP SERPL-MCNC: 20 PG/ML
PH URINE: 6.5
PLATELET # BLD AUTO: 219 K/UL
POTASSIUM SERPL-SCNC: 4.6 MMOL/L
PROT SERPL-MCNC: 7.5 G/DL
PROTEIN URINE: NORMAL
RBC # BLD: 5.34 M/UL
RBC # FLD: 12.6 %
SODIUM SERPL-SCNC: 136 MMOL/L
SPECIFIC GRAVITY URINE: 1.03
TRIGL SERPL-MCNC: 413 MG/DL
TSH SERPL-ACNC: 1.89 UIU/ML
UROBILINOGEN URINE: ABNORMAL
WBC # FLD AUTO: 7.5 K/UL

## 2021-02-27 RX ORDER — SITAGLIPTIN AND METFORMIN HYDROCHLORIDE 50; 1000 MG/1; MG/1
50-1000 TABLET, FILM COATED ORAL TWICE DAILY
Refills: 0 | Status: DISCONTINUED | COMMUNITY
End: 2021-02-27

## 2021-02-27 NOTE — HISTORY OF PRESENT ILLNESS
[FreeTextEntry1] : EKG shows NSR 80 bpm  with left axis deviation and no acute ischemia, ectopy or LVH.  \par \par Reports that his most recent A1c is 11%\par \par Denies bleeding\par \par No fevers,chills, cough \par \par Had to stop Ozempic because of nausea \par \par Reports energy level significantly improved since stents were placed\par \par Wt is down to 245 lbs \par \par Reports that he eats one meal daily \par \par No chest pain , palpitations, syncope , orthopnea \par \par

## 2021-02-27 NOTE — REVIEW OF SYSTEMS
[Recent Weight Loss (___ Lbs)] : recent [unfilled] ~Ulb weight loss [Dyspnea on exertion] : not dyspnea during exertion [Numbness (Hypesthesia)] : numbness [Tingling (Paresthesia)] : tingling [Negative] : Heme/Lymph

## 2021-02-27 NOTE — DISCUSSION/SUMMARY
[Coronary Artery Disease] : coronary artery disease [Stable] : stable [Responding to Treatment] : responding to treatment [___ Month(s)] : [unfilled] month(s) [With Me] : with me [FreeTextEntry1] : discussed dietary management of diabetes  and use of 14 day Leticia glucose monitoring system to better control  diabetes . I expressed my concern that he eats only one meal daily / diabetes nutrition counseling is strongly advised \par \par continue present aspirin and plavix \par \par venipuncture performed \par \par letter of medical necessity provided  for Covid vaccination

## 2021-02-27 NOTE — REASON FOR VISIT
[Follow-Up - Clinic] : a clinic follow-up of [Coronary Artery Disease] : coronary artery disease [Hyperlipidemia] : hyperlipidemia [FreeTextEntry1] : 45  year old male  with poorly controlled type 2 diabetes , HTN  has LAFB on resting EKG and a family hx of premature CAD. \par \par s/p PTCA/RENA of LAD and RCA. Has normal LVEF. \par \par \par

## 2021-02-27 NOTE — ASSESSMENT
[FreeTextEntry1] : stable multivessel CAD s/p percutaneous intervention\par \par Poorly controlled type 2 diabetes\par \par

## 2021-06-23 ENCOUNTER — TRANSCRIPTION ENCOUNTER (OUTPATIENT)
Age: 46
End: 2021-06-23

## 2021-09-19 ENCOUNTER — RX RENEWAL (OUTPATIENT)
Age: 46
End: 2021-09-19

## 2021-09-28 ENCOUNTER — INPATIENT (INPATIENT)
Facility: HOSPITAL | Age: 46
LOS: 3 days | Discharge: HOME | DRG: 617 | End: 2021-10-02
Attending: SURGERY | Admitting: SURGERY
Payer: COMMERCIAL

## 2021-09-28 ENCOUNTER — APPOINTMENT (OUTPATIENT)
Dept: VASCULAR SURGERY | Facility: CLINIC | Age: 46
End: 2021-09-28
Payer: SELF-PAY

## 2021-09-28 ENCOUNTER — TRANSCRIPTION ENCOUNTER (OUTPATIENT)
Age: 46
End: 2021-09-28

## 2021-09-28 VITALS
WEIGHT: 244.93 LBS | TEMPERATURE: 99 F | RESPIRATION RATE: 18 BRPM | HEART RATE: 85 BPM | DIASTOLIC BLOOD PRESSURE: 79 MMHG | HEIGHT: 75 IN | OXYGEN SATURATION: 99 % | SYSTOLIC BLOOD PRESSURE: 124 MMHG

## 2021-09-28 LAB
ALBUMIN SERPL ELPH-MCNC: 4.1 G/DL — SIGNIFICANT CHANGE UP (ref 3.3–5)
ALP SERPL-CCNC: 73 U/L — SIGNIFICANT CHANGE UP (ref 40–120)
ALT FLD-CCNC: 36 U/L — SIGNIFICANT CHANGE UP (ref 10–45)
ANION GAP SERPL CALC-SCNC: 11 MMOL/L — SIGNIFICANT CHANGE UP (ref 5–17)
APTT BLD: 28.1 SEC — SIGNIFICANT CHANGE UP (ref 27.5–35.5)
AST SERPL-CCNC: 19 U/L — SIGNIFICANT CHANGE UP (ref 10–40)
BASOPHILS # BLD AUTO: 0.07 K/UL — SIGNIFICANT CHANGE UP (ref 0–0.2)
BASOPHILS NFR BLD AUTO: 0.8 % — SIGNIFICANT CHANGE UP (ref 0–2)
BILIRUB SERPL-MCNC: 0.3 MG/DL — SIGNIFICANT CHANGE UP (ref 0.2–1.2)
BLD GP AB SCN SERPL QL: NEGATIVE — SIGNIFICANT CHANGE UP
BUN SERPL-MCNC: 28 MG/DL — HIGH (ref 7–23)
CALCIUM SERPL-MCNC: 10.2 MG/DL — SIGNIFICANT CHANGE UP (ref 8.4–10.5)
CHLORIDE SERPL-SCNC: 100 MMOL/L — SIGNIFICANT CHANGE UP (ref 96–108)
CO2 SERPL-SCNC: 24 MMOL/L — SIGNIFICANT CHANGE UP (ref 22–31)
CREAT SERPL-MCNC: 0.94 MG/DL — SIGNIFICANT CHANGE UP (ref 0.5–1.3)
EOSINOPHIL # BLD AUTO: 0.16 K/UL — SIGNIFICANT CHANGE UP (ref 0–0.5)
EOSINOPHIL NFR BLD AUTO: 1.8 % — SIGNIFICANT CHANGE UP (ref 0–6)
GLUCOSE SERPL-MCNC: 331 MG/DL — HIGH (ref 70–99)
GRAM STN FLD: SIGNIFICANT CHANGE UP
HCT VFR BLD CALC: 40.4 % — SIGNIFICANT CHANGE UP (ref 39–50)
HGB BLD-MCNC: 13.8 G/DL — SIGNIFICANT CHANGE UP (ref 13–17)
IMM GRANULOCYTES NFR BLD AUTO: 1.1 % — SIGNIFICANT CHANGE UP (ref 0–1.5)
INR BLD: 0.99 — SIGNIFICANT CHANGE UP (ref 0.88–1.16)
LACTATE SERPL-SCNC: 1.8 MMOL/L — SIGNIFICANT CHANGE UP (ref 0.5–2)
LYMPHOCYTES # BLD AUTO: 2.23 K/UL — SIGNIFICANT CHANGE UP (ref 1–3.3)
LYMPHOCYTES # BLD AUTO: 25.5 % — SIGNIFICANT CHANGE UP (ref 13–44)
MCHC RBC-ENTMCNC: 30.7 PG — SIGNIFICANT CHANGE UP (ref 27–34)
MCHC RBC-ENTMCNC: 34.2 GM/DL — SIGNIFICANT CHANGE UP (ref 32–36)
MCV RBC AUTO: 90 FL — SIGNIFICANT CHANGE UP (ref 80–100)
MONOCYTES # BLD AUTO: 0.61 K/UL — SIGNIFICANT CHANGE UP (ref 0–0.9)
MONOCYTES NFR BLD AUTO: 7 % — SIGNIFICANT CHANGE UP (ref 2–14)
NEUTROPHILS # BLD AUTO: 5.57 K/UL — SIGNIFICANT CHANGE UP (ref 1.8–7.4)
NEUTROPHILS NFR BLD AUTO: 63.8 % — SIGNIFICANT CHANGE UP (ref 43–77)
NRBC # BLD: 0 /100 WBCS — SIGNIFICANT CHANGE UP (ref 0–0)
PLATELET # BLD AUTO: 297 K/UL — SIGNIFICANT CHANGE UP (ref 150–400)
POTASSIUM SERPL-MCNC: 4.4 MMOL/L — SIGNIFICANT CHANGE UP (ref 3.5–5.3)
POTASSIUM SERPL-SCNC: 4.4 MMOL/L — SIGNIFICANT CHANGE UP (ref 3.5–5.3)
PROT SERPL-MCNC: 7.6 G/DL — SIGNIFICANT CHANGE UP (ref 6–8.3)
PROTHROM AB SERPL-ACNC: 11.9 SEC — SIGNIFICANT CHANGE UP (ref 10.6–13.6)
RBC # BLD: 4.49 M/UL — SIGNIFICANT CHANGE UP (ref 4.2–5.8)
RBC # FLD: 11.9 % — SIGNIFICANT CHANGE UP (ref 10.3–14.5)
RH IG SCN BLD-IMP: POSITIVE — SIGNIFICANT CHANGE UP
SARS-COV-2 RNA SPEC QL NAA+PROBE: NEGATIVE — SIGNIFICANT CHANGE UP
SODIUM SERPL-SCNC: 135 MMOL/L — SIGNIFICANT CHANGE UP (ref 135–145)
SPECIMEN SOURCE: SIGNIFICANT CHANGE UP
WBC # BLD: 8.74 K/UL — SIGNIFICANT CHANGE UP (ref 3.8–10.5)
WBC # FLD AUTO: 8.74 K/UL — SIGNIFICANT CHANGE UP (ref 3.8–10.5)

## 2021-09-28 PROCEDURE — 93923 UPR/LXTR ART STDY 3+ LVLS: CPT

## 2021-09-28 PROCEDURE — 99285 EMERGENCY DEPT VISIT HI MDM: CPT

## 2021-09-28 PROCEDURE — 99253 IP/OBS CNSLTJ NEW/EST LOW 45: CPT | Mod: GC

## 2021-09-28 PROCEDURE — 93010 ELECTROCARDIOGRAM REPORT: CPT

## 2021-09-28 PROCEDURE — 71045 X-RAY EXAM CHEST 1 VIEW: CPT | Mod: 26

## 2021-09-28 PROCEDURE — 99221 1ST HOSP IP/OBS SF/LOW 40: CPT | Mod: GC

## 2021-09-28 RX ORDER — PIPERACILLIN AND TAZOBACTAM 4; .5 G/20ML; G/20ML
3.38 INJECTION, POWDER, LYOPHILIZED, FOR SOLUTION INTRAVENOUS EVERY 6 HOURS
Refills: 0 | Status: DISCONTINUED | OUTPATIENT
Start: 2021-09-29 | End: 2021-09-29

## 2021-09-28 RX ORDER — INSULIN LISPRO 100/ML
VIAL (ML) SUBCUTANEOUS
Refills: 0 | Status: DISCONTINUED | OUTPATIENT
Start: 2021-09-28 | End: 2021-09-29

## 2021-09-28 RX ORDER — PIPERACILLIN AND TAZOBACTAM 4; .5 G/20ML; G/20ML
3.38 INJECTION, POWDER, LYOPHILIZED, FOR SOLUTION INTRAVENOUS ONCE
Refills: 0 | Status: COMPLETED | OUTPATIENT
Start: 2021-09-28 | End: 2021-09-28

## 2021-09-28 RX ORDER — ACETAMINOPHEN 500 MG
650 TABLET ORAL EVERY 6 HOURS
Refills: 0 | Status: DISCONTINUED | OUTPATIENT
Start: 2021-09-28 | End: 2021-09-29

## 2021-09-28 RX ORDER — EMPAGLIFLOZIN 10 MG/1
1 TABLET, FILM COATED ORAL
Qty: 0 | Refills: 0 | DISCHARGE

## 2021-09-28 RX ORDER — DEXTROSE 50 % IN WATER 50 %
25 SYRINGE (ML) INTRAVENOUS ONCE
Refills: 0 | Status: DISCONTINUED | OUTPATIENT
Start: 2021-09-28 | End: 2021-09-29

## 2021-09-28 RX ORDER — GLUCAGON INJECTION, SOLUTION 0.5 MG/.1ML
1 INJECTION, SOLUTION SUBCUTANEOUS ONCE
Refills: 0 | Status: DISCONTINUED | OUTPATIENT
Start: 2021-09-28 | End: 2021-09-29

## 2021-09-28 RX ORDER — DEXTROSE 50 % IN WATER 50 %
12.5 SYRINGE (ML) INTRAVENOUS ONCE
Refills: 0 | Status: DISCONTINUED | OUTPATIENT
Start: 2021-09-28 | End: 2021-09-29

## 2021-09-28 RX ORDER — ATORVASTATIN CALCIUM 80 MG/1
40 TABLET, FILM COATED ORAL AT BEDTIME
Refills: 0 | Status: DISCONTINUED | OUTPATIENT
Start: 2021-09-28 | End: 2021-09-29

## 2021-09-28 RX ORDER — VANCOMYCIN HCL 1 G
1250 VIAL (EA) INTRAVENOUS EVERY 12 HOURS
Refills: 0 | Status: DISCONTINUED | OUTPATIENT
Start: 2021-09-28 | End: 2021-09-28

## 2021-09-28 RX ORDER — DEXTROSE 50 % IN WATER 50 %
15 SYRINGE (ML) INTRAVENOUS ONCE
Refills: 0 | Status: DISCONTINUED | OUTPATIENT
Start: 2021-09-28 | End: 2021-09-29

## 2021-09-28 RX ORDER — ROSUVASTATIN CALCIUM 5 MG/1
1 TABLET ORAL
Qty: 0 | Refills: 0 | DISCHARGE

## 2021-09-28 RX ORDER — TETANUS TOXOID, REDUCED DIPHTHERIA TOXOID AND ACELLULAR PERTUSSIS VACCINE, ADSORBED 5; 2.5; 8; 8; 2.5 [IU]/.5ML; [IU]/.5ML; UG/.5ML; UG/.5ML; UG/.5ML
0.5 SUSPENSION INTRAMUSCULAR ONCE
Refills: 0 | Status: COMPLETED | OUTPATIENT
Start: 2021-09-28 | End: 2021-09-28

## 2021-09-28 RX ORDER — ASPIRIN/CALCIUM CARB/MAGNESIUM 324 MG
81 TABLET ORAL DAILY
Refills: 0 | Status: DISCONTINUED | OUTPATIENT
Start: 2021-09-28 | End: 2021-09-29

## 2021-09-28 RX ORDER — VANCOMYCIN HCL 1 G
1500 VIAL (EA) INTRAVENOUS ONCE
Refills: 0 | Status: COMPLETED | OUTPATIENT
Start: 2021-09-28 | End: 2021-09-28

## 2021-09-28 RX ORDER — LISINOPRIL 2.5 MG/1
1 TABLET ORAL
Qty: 0 | Refills: 0 | DISCHARGE

## 2021-09-28 RX ORDER — SODIUM CHLORIDE 9 MG/ML
1000 INJECTION, SOLUTION INTRAVENOUS
Refills: 0 | Status: DISCONTINUED | OUTPATIENT
Start: 2021-09-28 | End: 2021-09-29

## 2021-09-28 RX ORDER — SODIUM CHLORIDE 9 MG/ML
1000 INJECTION INTRAMUSCULAR; INTRAVENOUS; SUBCUTANEOUS
Refills: 0 | Status: DISCONTINUED | OUTPATIENT
Start: 2021-09-28 | End: 2021-09-29

## 2021-09-28 RX ORDER — ATORVASTATIN CALCIUM 80 MG/1
1 TABLET, FILM COATED ORAL
Qty: 0 | Refills: 0 | DISCHARGE

## 2021-09-28 RX ORDER — VANCOMYCIN HCL 1 G
1250 VIAL (EA) INTRAVENOUS EVERY 12 HOURS
Refills: 0 | Status: DISCONTINUED | OUTPATIENT
Start: 2021-09-29 | End: 2021-09-29

## 2021-09-28 RX ORDER — SEMAGLUTIDE 0.68 MG/ML
0.5 INJECTION, SOLUTION SUBCUTANEOUS
Qty: 0 | Refills: 0 | DISCHARGE

## 2021-09-28 RX ORDER — CLOPIDOGREL BISULFATE 75 MG/1
75 TABLET, FILM COATED ORAL DAILY
Refills: 0 | Status: DISCONTINUED | OUTPATIENT
Start: 2021-09-28 | End: 2021-09-29

## 2021-09-28 RX ORDER — HEPARIN SODIUM 5000 [USP'U]/ML
5000 INJECTION INTRAVENOUS; SUBCUTANEOUS EVERY 8 HOURS
Refills: 0 | Status: DISCONTINUED | OUTPATIENT
Start: 2021-09-28 | End: 2021-09-29

## 2021-09-28 RX ADMIN — Medication 500 MILLIGRAM(S): at 22:49

## 2021-09-28 RX ADMIN — SODIUM CHLORIDE 75 MILLILITER(S): 9 INJECTION INTRAMUSCULAR; INTRAVENOUS; SUBCUTANEOUS at 22:57

## 2021-09-28 RX ADMIN — TETANUS TOXOID, REDUCED DIPHTHERIA TOXOID AND ACELLULAR PERTUSSIS VACCINE, ADSORBED 0.5 MILLILITER(S): 5; 2.5; 8; 8; 2.5 SUSPENSION INTRAMUSCULAR at 21:47

## 2021-09-28 RX ADMIN — PIPERACILLIN AND TAZOBACTAM 200 GRAM(S): 4; .5 INJECTION, POWDER, LYOPHILIZED, FOR SOLUTION INTRAVENOUS at 21:42

## 2021-09-28 NOTE — ED PROVIDER NOTE - OBJECTIVE STATEMENT
45M daily smoker, cad s/p stent (9/3/2020), LAFB htn, dm,     cards: rachel 45M daily smoker, cad s/p stent (9/3/2020), LAFB htn, hld, dm, referred in by [facility] for R 3rd toe gangrene. pt c/o progressively worsening R 3rd toe rash s/p accidentally stepping on nail ~3w ago. subsequent subjective fever/chills started on augmentin by podiatry ~10d ago but progressively worsened turning black. podiatry then referred pt to [facility] who saw him this AM and referred him to ED for anticipated amputation. tetatnus NOT utd. no current fever/chills, no uri/cough, no cp/sob, no abd pain/n/v, no interim trauma, no etoh-dpt/ivdu.     cards: rachel  [facility]: dre

## 2021-09-28 NOTE — H&P ADULT - ASSESSMENT
Pt is a 46 y/o M with PMHx HTN, HLD, DM with neuropathy, CAD s/p stents x2 now being admitted for right 3rd toe gangrene.    Plan:  Admit to vascular surgery, Dr. Cary  Start Vancomycin and Zosyn  Continue home meds as appropriate  Keep NPO after midnight  Start NS@75ml/hr at midnight  Tylenol for pain  F/u wound culture  F/u blood culture  Pre-op for OR 9/29/21  F/u AM labs

## 2021-09-28 NOTE — H&P ADULT - HISTORY OF PRESENT ILLNESS
Pt is a 46 y/o M with PMHx HTN, HLD, DM with neuropathy, CAD s/p stents who presents to the ED to be admitted and pre-op for right 3rd toe gangrene. Pt states that about 3 weeks ago he accidentally stepped on a nail.  He saw a podiatrist about 10 days ago after having chills who started him on Augmentin.  He saw the podiatry recently for his second appointment at which time the toe at turned black and so he was referred to see Dr. Cary for further evaluation.  Pt was seen at Dr. Alvarez office today and was sent to the ED to be admitted and pre-op for right 3rd toe amputation. He denies fever, chills, chest pain, SOB, dizziness.  Pt is a 46 y/o M with PMHx HTN, HLD, DM with neuropathy, CAD s/p stents who presents to the ED to be admitted and pre-op for right 3rd toe gangrene. Pt states that about 3 weeks ago he accidentally stepped on a nail.  He saw a podiatrist about 10 days ago after having subjective fever and chills, and he was started on Augmentin.  He saw the podiatrist recently for his second appointment at which time the toe turned black and so he was referred to see Dr. Cary for further evaluation.  Pt was seen at Dr. Cary's office today and was referred to the ED to be admitted and pre-op for right 3rd toe amputation. He denies fever, chills, chest pain, SOB, dizziness.

## 2021-09-28 NOTE — H&P ADULT - NSHPPHYSICALEXAM_GEN_ALL_CORE
General: NAD, awake, alert, pleasant  Respiratory: Unlabored breathing, no respiratory distress  Cardiac: RRR  Extremity: Right foot swelling, erythema of the 2-4th toe, no warmth, right 3rd toe gangrene with ulcers at medial and lateral aspect of the toe, no active drainage appreciated but ulcer with fibrinous tissue, base of the toe with skin sloughing, no foul smell  Vascular: RLE: Biphasic DP, triphasic PT/Pop, palpable fem; LLE: Palpable DP/PT/Pop/Fem

## 2021-09-28 NOTE — H&P ADULT - NSHPLABSRESULTS_GEN_ALL_CORE
13.8   8.74  )-----------( 297      ( 28 Sep 2021 21:25 )             40.4   09-28    135  |  100  |  28<H>  ----------------------------<  331<H>  4.4   |  24  |  0.94    Ca    10.2      28 Sep 2021 21:25    TPro  7.6  /  Alb  4.1  /  TBili  0.3  /  DBili  x   /  AST  19  /  ALT  36  /  AlkPhos  73  09-28

## 2021-09-28 NOTE — ED PROVIDER NOTE - CARE PLAN
1 Principal Discharge DX:	Cellulitis   Principal Discharge DX:	Toe gangrene  Secondary Diagnosis:	Cellulitis

## 2021-09-28 NOTE — ED PROVIDER NOTE - PHYSICAL EXAMINATION
CONST: nontoxic NAD speaking in full sentences  HEAD: atraumatic  EYES: conjunctivae clear, PERRL, EOMI  ENT: mmm  NECK: supple/FROM  CARD: rrr no murmurs  CHEST: ctab no r/r/w  ABD: soft, nd, nttp, no rebound/guarding  EXT: FROM, symmetric distal pulses intact  SKIN: warm, dry, +R 3rd toe black gangrene w/ assoc medial/lateral toe ulcers, R 2nd-4th toe erythema/swelling, no crepitus/fluctuance, cap refill <2sec  NEURO: a+ox3, 5/5 strength x4, gross sensation intact x4

## 2021-09-28 NOTE — ED ADULT NURSE NOTE - OBJECTIVE STATEMENT
45y M, A&ox3, hx DM, presents to ED for for right foot pain, sent in by his doctor for admission. Reports x3 weeks ago stepped on nail and developed infection to right 3rd and 4th metatarsals. PT has been placed on antibiotics but with no improvement. Today noted gangrenous 3rd metatarsal with surrounding area with erythema. Denies fevers nor chills.

## 2021-09-28 NOTE — ED PROVIDER NOTE - CLINICAL SUMMARY MEDICAL DECISION MAKING FREE TEXT BOX
avss. nontoxic. NAD. no systemic sx. found to have R 3rd toe gangrene and assoc cellulitis w/ anticipated toe amputation. s/p vanc/zosyn. wound cx/bcx pending. preop labs/ekg/cxr done. s/p tdap. vasc consulted. will admit per reccs.

## 2021-09-28 NOTE — H&P ADULT - ATTENDING COMMENTS
Patient stepped on nail.  Now has gangrenous toe which is non-salvageable.  Will proceed with a toe amputation, and IV abx.

## 2021-09-28 NOTE — ED PROVIDER NOTE - PROGRESS NOTE DETAILS
[facility] reccs for preop labs/ekg/cxr, vanc/zosyn, admission to [facility]/tele under dr erickson.

## 2021-09-29 ENCOUNTER — RESULT REVIEW (OUTPATIENT)
Age: 46
End: 2021-09-29

## 2021-09-29 LAB
A1C WITH ESTIMATED AVERAGE GLUCOSE RESULT: 11.5 % — HIGH (ref 4–5.6)
ANION GAP SERPL CALC-SCNC: 11 MMOL/L — SIGNIFICANT CHANGE UP (ref 5–17)
APPEARANCE UR: CLEAR — SIGNIFICANT CHANGE UP
BILIRUB UR-MCNC: NEGATIVE — SIGNIFICANT CHANGE UP
BUN SERPL-MCNC: 24 MG/DL — HIGH (ref 7–23)
CALCIUM SERPL-MCNC: 9.8 MG/DL — SIGNIFICANT CHANGE UP (ref 8.4–10.5)
CHLORIDE SERPL-SCNC: 100 MMOL/L — SIGNIFICANT CHANGE UP (ref 96–108)
CO2 SERPL-SCNC: 24 MMOL/L — SIGNIFICANT CHANGE UP (ref 22–31)
COLOR SPEC: YELLOW — SIGNIFICANT CHANGE UP
COVID-19 SPIKE DOMAIN AB INTERP: POSITIVE
COVID-19 SPIKE DOMAIN ANTIBODY RESULT: 235 U/ML — HIGH
CREAT SERPL-MCNC: 0.71 MG/DL — SIGNIFICANT CHANGE UP (ref 0.5–1.3)
DIFF PNL FLD: NEGATIVE — SIGNIFICANT CHANGE UP
ESTIMATED AVERAGE GLUCOSE: 283 MG/DL — HIGH (ref 68–114)
GLUCOSE BLDC GLUCOMTR-MCNC: 153 MG/DL — HIGH (ref 70–99)
GLUCOSE BLDC GLUCOMTR-MCNC: 194 MG/DL — HIGH (ref 70–99)
GLUCOSE BLDC GLUCOMTR-MCNC: 213 MG/DL — HIGH (ref 70–99)
GLUCOSE BLDC GLUCOMTR-MCNC: 246 MG/DL — HIGH (ref 70–99)
GLUCOSE BLDC GLUCOMTR-MCNC: 268 MG/DL — HIGH (ref 70–99)
GLUCOSE SERPL-MCNC: 269 MG/DL — HIGH (ref 70–99)
GLUCOSE UR QL: 500
GRAM STN FLD: SIGNIFICANT CHANGE UP
HCT VFR BLD CALC: 39.8 % — SIGNIFICANT CHANGE UP (ref 39–50)
HGB BLD-MCNC: 13.3 G/DL — SIGNIFICANT CHANGE UP (ref 13–17)
KETONES UR-MCNC: NEGATIVE — SIGNIFICANT CHANGE UP
LEUKOCYTE ESTERASE UR-ACNC: NEGATIVE — SIGNIFICANT CHANGE UP
MAGNESIUM SERPL-MCNC: 1.6 MG/DL — SIGNIFICANT CHANGE UP (ref 1.6–2.6)
MCHC RBC-ENTMCNC: 30.2 PG — SIGNIFICANT CHANGE UP (ref 27–34)
MCHC RBC-ENTMCNC: 33.4 GM/DL — SIGNIFICANT CHANGE UP (ref 32–36)
MCV RBC AUTO: 90.2 FL — SIGNIFICANT CHANGE UP (ref 80–100)
NITRITE UR-MCNC: NEGATIVE — SIGNIFICANT CHANGE UP
NRBC # BLD: 0 /100 WBCS — SIGNIFICANT CHANGE UP (ref 0–0)
PH UR: 6 — SIGNIFICANT CHANGE UP (ref 5–8)
PHOSPHATE SERPL-MCNC: 3.9 MG/DL — SIGNIFICANT CHANGE UP (ref 2.5–4.5)
PLATELET # BLD AUTO: 261 K/UL — SIGNIFICANT CHANGE UP (ref 150–400)
POTASSIUM SERPL-MCNC: 4.3 MMOL/L — SIGNIFICANT CHANGE UP (ref 3.5–5.3)
POTASSIUM SERPL-SCNC: 4.3 MMOL/L — SIGNIFICANT CHANGE UP (ref 3.5–5.3)
PROT UR-MCNC: NEGATIVE MG/DL — SIGNIFICANT CHANGE UP
RBC # BLD: 4.41 M/UL — SIGNIFICANT CHANGE UP (ref 4.2–5.8)
RBC # FLD: 11.8 % — SIGNIFICANT CHANGE UP (ref 10.3–14.5)
SARS-COV-2 IGG+IGM SERPL QL IA: 235 U/ML — HIGH
SARS-COV-2 IGG+IGM SERPL QL IA: POSITIVE
SODIUM SERPL-SCNC: 135 MMOL/L — SIGNIFICANT CHANGE UP (ref 135–145)
SP GR SPEC: 1.02 — SIGNIFICANT CHANGE UP (ref 1–1.03)
SPECIMEN SOURCE: SIGNIFICANT CHANGE UP
UROBILINOGEN FLD QL: 0.2 E.U./DL — SIGNIFICANT CHANGE UP
WBC # BLD: 8.77 K/UL — SIGNIFICANT CHANGE UP (ref 3.8–10.5)
WBC # FLD AUTO: 8.77 K/UL — SIGNIFICANT CHANGE UP (ref 3.8–10.5)

## 2021-09-29 PROCEDURE — 88305 TISSUE EXAM BY PATHOLOGIST: CPT | Mod: 26

## 2021-09-29 PROCEDURE — 99232 SBSQ HOSP IP/OBS MODERATE 35: CPT

## 2021-09-29 PROCEDURE — 28820 AMPUTATION OF TOE: CPT | Mod: GC

## 2021-09-29 RX ORDER — VANCOMYCIN HCL 1 G
1250 VIAL (EA) INTRAVENOUS EVERY 12 HOURS
Refills: 0 | Status: DISCONTINUED | OUTPATIENT
Start: 2021-09-29 | End: 2021-09-30

## 2021-09-29 RX ORDER — DEXTROSE 50 % IN WATER 50 %
25 SYRINGE (ML) INTRAVENOUS ONCE
Refills: 0 | Status: DISCONTINUED | OUTPATIENT
Start: 2021-09-29 | End: 2021-10-02

## 2021-09-29 RX ORDER — GLUCAGON INJECTION, SOLUTION 0.5 MG/.1ML
1 INJECTION, SOLUTION SUBCUTANEOUS ONCE
Refills: 0 | Status: DISCONTINUED | OUTPATIENT
Start: 2021-09-29 | End: 2021-10-02

## 2021-09-29 RX ORDER — ACETAMINOPHEN 500 MG
650 TABLET ORAL EVERY 6 HOURS
Refills: 0 | Status: DISCONTINUED | OUTPATIENT
Start: 2021-09-29 | End: 2021-10-02

## 2021-09-29 RX ORDER — PIPERACILLIN AND TAZOBACTAM 4; .5 G/20ML; G/20ML
3.38 INJECTION, POWDER, LYOPHILIZED, FOR SOLUTION INTRAVENOUS EVERY 6 HOURS
Refills: 0 | Status: DISCONTINUED | OUTPATIENT
Start: 2021-09-29 | End: 2021-10-01

## 2021-09-29 RX ORDER — ASPIRIN/CALCIUM CARB/MAGNESIUM 324 MG
81 TABLET ORAL EVERY 24 HOURS
Refills: 0 | Status: DISCONTINUED | OUTPATIENT
Start: 2021-09-30 | End: 2021-10-02

## 2021-09-29 RX ORDER — INSULIN LISPRO 100/ML
VIAL (ML) SUBCUTANEOUS
Refills: 0 | Status: DISCONTINUED | OUTPATIENT
Start: 2021-09-29 | End: 2021-10-02

## 2021-09-29 RX ORDER — ATORVASTATIN CALCIUM 80 MG/1
40 TABLET, FILM COATED ORAL AT BEDTIME
Refills: 0 | Status: DISCONTINUED | OUTPATIENT
Start: 2021-09-29 | End: 2021-10-02

## 2021-09-29 RX ORDER — HEPARIN SODIUM 5000 [USP'U]/ML
5000 INJECTION INTRAVENOUS; SUBCUTANEOUS EVERY 8 HOURS
Refills: 0 | Status: DISCONTINUED | OUTPATIENT
Start: 2021-09-29 | End: 2021-10-02

## 2021-09-29 RX ORDER — DEXTROSE 50 % IN WATER 50 %
12.5 SYRINGE (ML) INTRAVENOUS ONCE
Refills: 0 | Status: DISCONTINUED | OUTPATIENT
Start: 2021-09-29 | End: 2021-10-02

## 2021-09-29 RX ORDER — INSULIN GLARGINE 100 [IU]/ML
12 INJECTION, SOLUTION SUBCUTANEOUS AT BEDTIME
Refills: 0 | Status: DISCONTINUED | OUTPATIENT
Start: 2021-09-29 | End: 2021-09-30

## 2021-09-29 RX ORDER — SODIUM CHLORIDE 9 MG/ML
1000 INJECTION, SOLUTION INTRAVENOUS
Refills: 0 | Status: DISCONTINUED | OUTPATIENT
Start: 2021-09-29 | End: 2021-10-02

## 2021-09-29 RX ORDER — DEXTROSE 50 % IN WATER 50 %
15 SYRINGE (ML) INTRAVENOUS ONCE
Refills: 0 | Status: DISCONTINUED | OUTPATIENT
Start: 2021-09-29 | End: 2021-10-02

## 2021-09-29 RX ORDER — SODIUM CHLORIDE 9 MG/ML
1000 INJECTION INTRAMUSCULAR; INTRAVENOUS; SUBCUTANEOUS
Refills: 0 | Status: DISCONTINUED | OUTPATIENT
Start: 2021-09-29 | End: 2021-09-29

## 2021-09-29 RX ORDER — INFLUENZA VIRUS VACCINE 15; 15; 15; 15 UG/.5ML; UG/.5ML; UG/.5ML; UG/.5ML
0.5 SUSPENSION INTRAMUSCULAR ONCE
Refills: 0 | Status: DISCONTINUED | OUTPATIENT
Start: 2021-09-29 | End: 2021-10-02

## 2021-09-29 RX ORDER — CLOPIDOGREL BISULFATE 75 MG/1
75 TABLET, FILM COATED ORAL EVERY 24 HOURS
Refills: 0 | Status: DISCONTINUED | OUTPATIENT
Start: 2021-09-30 | End: 2021-10-02

## 2021-09-29 RX ADMIN — INSULIN GLARGINE 12 UNIT(S): 100 INJECTION, SOLUTION SUBCUTANEOUS at 22:43

## 2021-09-29 RX ADMIN — Medication 6: at 22:42

## 2021-09-29 RX ADMIN — PIPERACILLIN AND TAZOBACTAM 25 GRAM(S): 4; .5 INJECTION, POWDER, LYOPHILIZED, FOR SOLUTION INTRAVENOUS at 02:55

## 2021-09-29 RX ADMIN — PIPERACILLIN AND TAZOBACTAM 25 GRAM(S): 4; .5 INJECTION, POWDER, LYOPHILIZED, FOR SOLUTION INTRAVENOUS at 09:44

## 2021-09-29 RX ADMIN — Medication 4: at 06:49

## 2021-09-29 RX ADMIN — Medication 2: at 16:24

## 2021-09-29 RX ADMIN — ATORVASTATIN CALCIUM 40 MILLIGRAM(S): 80 TABLET, FILM COATED ORAL at 21:54

## 2021-09-29 RX ADMIN — Medication 166.67 MILLIGRAM(S): at 11:14

## 2021-09-29 RX ADMIN — HEPARIN SODIUM 5000 UNIT(S): 5000 INJECTION INTRAVENOUS; SUBCUTANEOUS at 17:57

## 2021-09-29 RX ADMIN — PIPERACILLIN AND TAZOBACTAM 200 GRAM(S): 4; .5 INJECTION, POWDER, LYOPHILIZED, FOR SOLUTION INTRAVENOUS at 15:33

## 2021-09-29 RX ADMIN — CLOPIDOGREL BISULFATE 75 MILLIGRAM(S): 75 TABLET, FILM COATED ORAL at 11:32

## 2021-09-29 RX ADMIN — Medication 81 MILLIGRAM(S): at 11:32

## 2021-09-29 RX ADMIN — HEPARIN SODIUM 5000 UNIT(S): 5000 INJECTION INTRAVENOUS; SUBCUTANEOUS at 05:54

## 2021-09-29 RX ADMIN — Medication 166.67 MILLIGRAM(S): at 21:55

## 2021-09-29 RX ADMIN — PIPERACILLIN AND TAZOBACTAM 200 GRAM(S): 4; .5 INJECTION, POWDER, LYOPHILIZED, FOR SOLUTION INTRAVENOUS at 21:07

## 2021-09-29 NOTE — PROGRESS NOTE ADULT - SUBJECTIVE AND OBJECTIVE BOX
24hr Events:  O/N: admitted, started on Vanc/zosyn, pre-opped/consented and added on for OR tomorrow right 3rd toe amputation, wound culture sent, VSS            Assessment/Plan:  Pt is a 46 y/o M with PMHx HTN, HLD, DM with neuropathy, CAD s/p stents x2 now being admitted for right 3rd toe gangrene.    Plan:  Continue Vancomycin and Zosyn  Continue home meds as appropriate, holding lisinopril for OR  NPO for OR- right 3rd toe amputation  NS@75ml/hr at midnight  Tylenol for pain  F/u wound culture  F/u blood culture  F/u AM labs 24hr Events:  O/N: admitted, started on Vanc/zosyn, pre-opped/consented and added on for OR tomorrow right 3rd toe amputation, wound culture sent, VSS    Subjective: Denies pain in right foot overnight. denies pain ascending to right calf. Denies fevers or chills.     ROS:   Denies Headache, blurred vision, Chest Pain, SOB, Abdominal pain, nausea or vomiting     Social   piperacillin/tazobactam IVPB.. 3.375  vancomycin  IVPB 1250  aspirin  chewable 81  clopidogrel Tablet 75  heparin   Injectable 5000  piperacillin/tazobactam IVPB.. 3.375  vancomycin  IVPB 1250      Allergies    No Known Allergies    Intolerances        Vital Signs Last 24 Hrs  T(C): 36.7 (29 Sep 2021 05:46), Max: 37.1 (28 Sep 2021 20:10)  T(F): 98.1 (29 Sep 2021 05:46), Max: 98.8 (28 Sep 2021 20:10)  HR: 72 (29 Sep 2021 05:46) (71 - 85)  BP: 115/58 (29 Sep 2021 05:49) (108/62 - 124/79)  BP(mean): --  RR: 18 (29 Sep 2021 05:46) (16 - 18)  SpO2: 97% (29 Sep 2021 05:46) (95% - 99%)  I&O's Summary      Physical Exam:  General: NAD  Pulmonary: b/l breath sounds   Cardiovascular: s1s2 Regular   Abdominal: soft   Extremities: Right 3rd toe with dry gangrene. slight erythema of right 3rd toe base. no ascending erythema. no right calf tenderness. No edema of right foot   Right DP/PT Biphasic doppler tones     LABS:                        13.3   8.77  )-----------( 261      ( 29 Sep 2021 05:50 )             39.8     09-29    135  |  100  |  24<H>  ----------------------------<  269<H>  4.3   |  24  |  0.71    Ca    9.8      29 Sep 2021 05:50  Phos  3.9     09-29  Mg     1.6     09-29    TPro  7.6  /  Alb  4.1  /  TBili  0.3  /  DBili  x   /  AST  19  /  ALT  36  /  AlkPhos  73  09-28    PT/INR - ( 28 Sep 2021 21:25 )   PT: 11.9 sec;   INR: 0.99          PTT - ( 28 Sep 2021 21:25 )  PTT:28.1 sec    Radiology and Additional Studies:        ---------------------------------------------------------------------------  PLEASE CHECK WHEN PRESENT:     [  ]Heart Failure     [  ] Acute     [  ] Acute on Chronic     [  ] Chronic  -------------------------------------------------------------------     [  ]Diastolic [HFpEF]     [  ]Systolic [HFrEF]     [  ]Combined [HFpEF & HFrEF]  .................................................................................     [  ]Other:     [ ] Pulmonary Hypertension     [ ] Afib     [x ] Hypertensive Heart Disease  -------------------------------------------------------------------  [ ] Respiratory failure  [ ] Acute cor pulmonale  [ ] Asthma/COPD Exacerbation  [ ] Pleural effusion  [ ] Aspiration pneumonia  [ ] Obstructive Sleep Apnea  -------------------------------------------------------------------  [  ]ROSANNE     [  ]ATN     [  ]Reneal Medullary Necrosis     [  ]Renal Cortical Necrosis     [  ]Other Pathological Lesions:    [  ]CKD 1  [  ]CKD 2  [  ]CKD 3  [  ]CKD 4  [  ]CKD 5  [  ]Other  -------------------------------------------------------------------  [ x ]Diabetes  [  ] Diabetic PVD Ulcer  [ x ] Neuropathic ulcer to DM  [  ] Diabetes with Nephropathy  [  ] Osteomyelitis due to diabetes  [  ] Hyperglycemia   [  ]hypoglycemia   --------------------------------------------------------------------  [  ]Malnutrition: See Nutrition Note  [  ]Cachexia  [  ]Other:   [  ]Supplement Ordered:  [  ]Morbid Obesity (BMI >=40]  ---------------------------------------------------------------------  [ ] Sepsis/severe sepsis/septic shock  [ ] Noninfectious SIRS  [ ] UTI  [ ] Pneumonia  -----------------------------------------------------------------------  [ ] Acidosis/alkalosis  [ ] Fluid overload  [ ] Hypokalemia  [ ] Hyperkalemia  [ ] Hypomagnesemia  [ ] Hypophosphatemia  [ ] Hyperphosphatemia  ------------------------------------------------------------------------  [ ] Acute blood loss anemia  [ ] Post op blood loss anemia  [ ] Iron deficiency anemia  [ ] Anemia due to chronic disease  [ ] Hypercoagulable state  [ ] Thrombocytopenia  ----------------------------------------------------------------------  [ ] Cerebral infarction  [ ] Transient ischemia attack  [ ] Encephalopathy - Toxic or Metabolic            A/P: 45y YO Male w/ Hx of HTN, HLD, DM, CAD who presents from Dr. Alvarez office with right 3rd toe gangrene after stepping on a nail 3 weeks ago at work       Vascular/PAD:  -Doppler tones noted in right foot   -Continue ASA+ plavix   -will discuss plans for angiogram with attending pending wound healing progression     HLD:  -c/w Storvastatin 40    HTN:   - Holding home Lisinopril 20    CAD/CHF:   -Hx of CAD s/p stents at OSH   -Continue ASA+plavix and statin     DM:  -HgA1c 11  - ISS   -will complete Medication reconciliation   -Start Insulin as needed     ID:  -Rt foot 3rd toe gangrene   -NPO for Possible Toe amputation today + NS@75  -Continue Vanc+ zosyn for now   -f/u wound cx   -f/u blood cx     DVTPPx:SQH     Dispo: possible OR today

## 2021-09-29 NOTE — BRIEF OPERATIVE NOTE - NSICDXBRIEFPREOP_GEN_ALL_CORE_FT
PRE-OP DIAGNOSIS:  Gangrene of toe 29-Sep-2021 13:24:59  Sally Estrada  Diabetic foot infection 29-Sep-2021 13:25:24  Sally Estrada

## 2021-09-29 NOTE — PROGRESS NOTE ADULT - SUBJECTIVE AND OBJECTIVE BOX
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 Cary    Consent: in chart                          13.8   8.74  )-----------( 297      ( 28 Sep 2021 21:25 )             40.4     09-28    135  |  100  |  28<H>  ----------------------------<  331<H>  4.4   |  24  |  0.94    Ca    10.2      28 Sep 2021 21:25    TPro  7.6  /  Alb  4.1  /  TBili  0.3  /  DBili  x   /  AST  19  /  ALT  36  /  AlkPhos  73  09-28    PT/INR - ( 28 Sep 2021 21:25 )   PT: 11.9 sec;   INR: 0.99          PTT - ( 28 Sep 2021 21:25 )  PTT:28.1 sec      Type & Screen: B+ AB negitive  (*With most recent within 72hrs of OR)  CXR: clear lungs official read pending  EKG: performed in sinus  UA: pending collection    COVID status/date: [x]Negative/Date:  [ ]Positive/Date:     *With most recent within 48hrs of OR    Is patient on ACE/ARB? [ ]No [x ]Yes (holding for surgery)  *If yes, please hold any ACE/ARB the day of surgery    Is patient on Lantus at bedtime?  [x]No [ ]Yes   *If yes, please half the dose the night before OR since patient will be NPO    Does patient have a contrast allergy? [x]No [ ]Yes  *If yes, please pre-medicate per protocol    Is patient on anticoagulation? [x]No [ ] Yes  *If yes, please discuss with team when to hold it    Is the patient Female and <54yo [x]No [ ] Yes  If yes, pregnancy test must be documented in the chart    Is patient on dialysis? [x]No [ ]Yes  *If yes, please obtain all labs including K level EARLY the day of surgery   *Also, will NOT require IVF past midnight    A/P: 45yMale pre-op for above procedure  1. NPO past midnight, except medications  2. IVF at midnight: NS @ 75cc/hr  3. [x] Blood on hold, Units: 2 units

## 2021-09-29 NOTE — PROGRESS NOTE ADULT - SUBJECTIVE AND OBJECTIVE BOX
Vascular Surgery Post-Op Note    Procedure: right 3rd toe amputation    Diagnosis/Indication: right 3rd toe gangrene    Surgeon: Dr. Cary    S: Pt has no complaints. Denies CP, SOB, or foot pain.     O:  T(C): 36.3 (09-29-21 @ 13:40), Max: 36.8 (09-29-21 @ 11:30)  T(F): 97.4 (09-29-21 @ 13:40), Max: 98.2 (09-29-21 @ 11:30)  HR: 71 (09-29-21 @ 15:08) (68 - 86)  BP: 118/73 (09-29-21 @ 15:08) (110/70 - 134/67)  RR: 18 (09-29-21 @ 15:08) (12 - 19)  SpO2: 97% (09-29-21 @ 15:08) (95% - 98%)  Wt(kg): --                        13.3   8.77  )-----------( 261      ( 29 Sep 2021 05:50 )             39.8     09-29    135  |  100  |  24<H>  ----------------------------<  269<H>  4.3   |  24  |  0.71    Ca    9.8      29 Sep 2021 05:50  Phos  3.9     09-29  Mg     1.6     09-29    TPro  7.6  /  Alb  4.1  /  TBili  0.3  /  DBili  x   /  AST  19  /  ALT  36  /  AlkPhos  73  09-28      Gen: NAD, resting comfortably in bed  C/V: NSR  Pulm: Nonlabored breathing, no respiratory distress  Abd: soft, NT/ND  Extrem: right foot dressing C/D/I, no bleeding       A/P: A/P: 45y YO Male w/ Hx of HTN, HLD, DM, CAD who presents from Dr. Alvarez office with right 3rd toe gangrene after stepping on a nail 3 weeks ago at work, now s/p right 3rd toe amputation 9/29/21      Vascular/PAD:  -s/p right 3rd toe amputation 9/29/21  -Continue ASA+ plavix   -will discuss plans for angiogram with attending pending wound healing progression     HLD:  -c/w Atorvastatin 40    HTN:   - Holding home Lisinopril 20    CAD/CHF:   -Hx of CAD s/p stents at OSH   -Continue ASA+plavix and statin     DM:  -HgA1c 11  - ISS   -Start Insulin as needed     ID:  -Rt foot 3rd toe gangrene  s/p right 3rd toe amputation 9/29/21  -Continue Vanc+ zosyn for now   -f/u wound cx , OR cx  -f/u blood cx     DVTPPx: SQH

## 2021-09-30 LAB
ANION GAP SERPL CALC-SCNC: 9 MMOL/L — SIGNIFICANT CHANGE UP (ref 5–17)
BUN SERPL-MCNC: 15 MG/DL — SIGNIFICANT CHANGE UP (ref 7–23)
CALCIUM SERPL-MCNC: 9.9 MG/DL — SIGNIFICANT CHANGE UP (ref 8.4–10.5)
CHLORIDE SERPL-SCNC: 99 MMOL/L — SIGNIFICANT CHANGE UP (ref 96–108)
CO2 SERPL-SCNC: 26 MMOL/L — SIGNIFICANT CHANGE UP (ref 22–31)
CREAT SERPL-MCNC: 0.56 MG/DL — SIGNIFICANT CHANGE UP (ref 0.5–1.3)
GLUCOSE BLDC GLUCOMTR-MCNC: 118 MG/DL — HIGH (ref 70–99)
GLUCOSE BLDC GLUCOMTR-MCNC: 146 MG/DL — HIGH (ref 70–99)
GLUCOSE BLDC GLUCOMTR-MCNC: 239 MG/DL — HIGH (ref 70–99)
GLUCOSE BLDC GLUCOMTR-MCNC: 322 MG/DL — HIGH (ref 70–99)
GLUCOSE SERPL-MCNC: 266 MG/DL — HIGH (ref 70–99)
HCT VFR BLD CALC: 40.9 % — SIGNIFICANT CHANGE UP (ref 39–50)
HGB BLD-MCNC: 13.8 G/DL — SIGNIFICANT CHANGE UP (ref 13–17)
MAGNESIUM SERPL-MCNC: 1.7 MG/DL — SIGNIFICANT CHANGE UP (ref 1.6–2.6)
MCHC RBC-ENTMCNC: 30 PG — SIGNIFICANT CHANGE UP (ref 27–34)
MCHC RBC-ENTMCNC: 33.7 GM/DL — SIGNIFICANT CHANGE UP (ref 32–36)
MCV RBC AUTO: 88.9 FL — SIGNIFICANT CHANGE UP (ref 80–100)
NRBC # BLD: 0 /100 WBCS — SIGNIFICANT CHANGE UP (ref 0–0)
PHOSPHATE SERPL-MCNC: 2.8 MG/DL — SIGNIFICANT CHANGE UP (ref 2.5–4.5)
PLATELET # BLD AUTO: 248 K/UL — SIGNIFICANT CHANGE UP (ref 150–400)
POTASSIUM SERPL-MCNC: 4.6 MMOL/L — SIGNIFICANT CHANGE UP (ref 3.5–5.3)
POTASSIUM SERPL-SCNC: 4.6 MMOL/L — SIGNIFICANT CHANGE UP (ref 3.5–5.3)
RBC # BLD: 4.6 M/UL — SIGNIFICANT CHANGE UP (ref 4.2–5.8)
RBC # FLD: 11.9 % — SIGNIFICANT CHANGE UP (ref 10.3–14.5)
SODIUM SERPL-SCNC: 134 MMOL/L — LOW (ref 135–145)
VANCOMYCIN TROUGH SERPL-MCNC: 8.1 UG/ML — LOW (ref 10–20)
WBC # BLD: 8.68 K/UL — SIGNIFICANT CHANGE UP (ref 3.8–10.5)
WBC # FLD AUTO: 8.68 K/UL — SIGNIFICANT CHANGE UP (ref 3.8–10.5)

## 2021-09-30 PROCEDURE — 99255 IP/OBS CONSLTJ NEW/EST HI 80: CPT

## 2021-09-30 PROCEDURE — 99222 1ST HOSP IP/OBS MODERATE 55: CPT | Mod: GC

## 2021-09-30 RX ORDER — INSULIN LISPRO 100/ML
8 VIAL (ML) SUBCUTANEOUS
Refills: 0 | Status: DISCONTINUED | OUTPATIENT
Start: 2021-09-30 | End: 2021-10-02

## 2021-09-30 RX ORDER — VANCOMYCIN HCL 1 G
1250 VIAL (EA) INTRAVENOUS EVERY 8 HOURS
Refills: 0 | Status: DISCONTINUED | OUTPATIENT
Start: 2021-09-30 | End: 2021-10-01

## 2021-09-30 RX ORDER — ASCORBIC ACID 60 MG
500 TABLET,CHEWABLE ORAL DAILY
Refills: 0 | Status: DISCONTINUED | OUTPATIENT
Start: 2021-09-30 | End: 2021-10-02

## 2021-09-30 RX ORDER — INSULIN GLARGINE 100 [IU]/ML
25 INJECTION, SOLUTION SUBCUTANEOUS AT BEDTIME
Refills: 0 | Status: DISCONTINUED | OUTPATIENT
Start: 2021-09-30 | End: 2021-10-01

## 2021-09-30 RX ORDER — INSULIN LISPRO 100/ML
5 VIAL (ML) SUBCUTANEOUS
Refills: 0 | Status: DISCONTINUED | OUTPATIENT
Start: 2021-09-30 | End: 2021-09-30

## 2021-09-30 RX ORDER — LISINOPRIL 2.5 MG/1
20 TABLET ORAL DAILY
Refills: 0 | Status: DISCONTINUED | OUTPATIENT
Start: 2021-09-30 | End: 2021-10-02

## 2021-09-30 RX ADMIN — INSULIN GLARGINE 25 UNIT(S): 100 INJECTION, SOLUTION SUBCUTANEOUS at 22:14

## 2021-09-30 RX ADMIN — CLOPIDOGREL BISULFATE 75 MILLIGRAM(S): 75 TABLET, FILM COATED ORAL at 06:00

## 2021-09-30 RX ADMIN — PIPERACILLIN AND TAZOBACTAM 200 GRAM(S): 4; .5 INJECTION, POWDER, LYOPHILIZED, FOR SOLUTION INTRAVENOUS at 09:48

## 2021-09-30 RX ADMIN — Medication 8 UNIT(S): at 18:05

## 2021-09-30 RX ADMIN — Medication 5 UNIT(S): at 14:07

## 2021-09-30 RX ADMIN — HEPARIN SODIUM 5000 UNIT(S): 5000 INJECTION INTRAVENOUS; SUBCUTANEOUS at 01:52

## 2021-09-30 RX ADMIN — Medication 166.67 MILLIGRAM(S): at 22:14

## 2021-09-30 RX ADMIN — PIPERACILLIN AND TAZOBACTAM 200 GRAM(S): 4; .5 INJECTION, POWDER, LYOPHILIZED, FOR SOLUTION INTRAVENOUS at 02:19

## 2021-09-30 RX ADMIN — PIPERACILLIN AND TAZOBACTAM 200 GRAM(S): 4; .5 INJECTION, POWDER, LYOPHILIZED, FOR SOLUTION INTRAVENOUS at 15:01

## 2021-09-30 RX ADMIN — PIPERACILLIN AND TAZOBACTAM 200 GRAM(S): 4; .5 INJECTION, POWDER, LYOPHILIZED, FOR SOLUTION INTRAVENOUS at 21:13

## 2021-09-30 RX ADMIN — Medication 166.67 MILLIGRAM(S): at 10:54

## 2021-09-30 RX ADMIN — Medication 8: at 06:54

## 2021-09-30 RX ADMIN — Medication 4: at 12:28

## 2021-09-30 RX ADMIN — LISINOPRIL 20 MILLIGRAM(S): 2.5 TABLET ORAL at 21:14

## 2021-09-30 RX ADMIN — HEPARIN SODIUM 5000 UNIT(S): 5000 INJECTION INTRAVENOUS; SUBCUTANEOUS at 17:37

## 2021-09-30 RX ADMIN — Medication 81 MILLIGRAM(S): at 06:00

## 2021-09-30 RX ADMIN — Medication 500 MILLIGRAM(S): at 12:28

## 2021-09-30 RX ADMIN — Medication 1 TABLET(S): at 12:28

## 2021-09-30 RX ADMIN — ATORVASTATIN CALCIUM 40 MILLIGRAM(S): 80 TABLET, FILM COATED ORAL at 21:13

## 2021-09-30 RX ADMIN — HEPARIN SODIUM 5000 UNIT(S): 5000 INJECTION INTRAVENOUS; SUBCUTANEOUS at 10:54

## 2021-09-30 NOTE — ASSESSMENT
[FreeTextEntry1] : 45 year old male with PMH of DM, 1st degree AV block, CAD, HLD presenting to the office for right third toe gangrene. He states about 2 weeks ago he stopped on a nail while wearing his shoes. It id not go too deep but he cleaned it every day with peroxide and wrapped it. End of last week he noticed the third toe starting to get dark and dusky, he went to see his podiatrist Dr. Hoffman who saw him twice and referred him here for vascular evaluation. \par \par Plan:\par - ZORAN/PVR done today with normal waveforms. He has gangrene of the third toe and second toe appears to also be affected. Recommend he go to the ED for admission for IV abx and amputation of the third toe. Patient understands and will go immediately.

## 2021-09-30 NOTE — DIETITIAN INITIAL EVALUATION ADULT. - PERTINENT MEDS FT
aspirin  Plavix  heparin   vanco  zosyn   premeal insulin   bedtime lantus  corrective sliding scale  Lipitor

## 2021-09-30 NOTE — DIETITIAN INITIAL EVALUATION ADULT. - OTHER CALCULATIONS
IBW used to calculate energy needs due to pt's current body weight exceeding 120% of IBW; adjusted for age, post op healing needs of Skin

## 2021-09-30 NOTE — HISTORY OF PRESENT ILLNESS
[FreeTextEntry1] : 45 year old male with PMH of DM, 1st degree AV block, CAD, HLD presenting to the office for right third toe gangrene. He states about 2 weeks ago he stopped on a nail while wearing his shoes. It id not go too deep but he cleaned it every day with peroxide and wrapped it. End of last week he noticed the third toe starting to get dark and dusky, he went to see his podiatrist Dr. Hoffman who saw him twice and referred him here for vascular evaluation. \par \par previous smoker, he quit one month ago, 30 year 1ppd smoker.

## 2021-09-30 NOTE — CONSULT NOTE ADULT - SUBJECTIVE AND OBJECTIVE BOX
CC: R 3rd toe gangrene    HPI:  Per admission H&P:  "Pt is a 44 y/o M with PMHx HTN, HLD, DM with neuropathy, CAD s/p stents who presents to the ED to be admitted and pre-op for right 3rd toe gangrene. Pt states that about 3 weeks ago he accidentally stepped on a nail.  He saw a podiatrist about 10 days ago after having subjective fever and chills, and he was started on Augmentin.  He saw the podiatrist recently for his second appointment at which time the toe turned black and so he was referred to see Dr. Cary for further evaluation.  Pt was seen at Dr. Cary's office today and was referred to the ED to be admitted and pre-op for right 3rd toe amputation. He denies fever, chills, chest pain, SOB, dizziness."    Patient admitted to vascular surgery service. Had amputation of R 3rd toe done yesterday. Seen by me today, doing well, confirms above history. Pain well controlled at this time with pain meds. No fevers, chills, dizziness, lightheadedness. Received tetanus shot yesterday.    Discussed his diabetes - he takes glipizide and metformin, is adherent to these medications. a1c was last checked about 6-7 months ago and was about 9%. He has an endocrinologist but she moved away, has been in touch via phone but he is interested in switching endocrinologists.    12 point ROS reviewed and negative except as otherwise stated in HPI and below    PAST MEDICAL & SURGICAL HISTORY:  Type II diabetes mellitus    Hyperlipidemia    Hypertension    Coronary artery disease    No significant past surgical history      SOCIAL HISTORY:  Tobacco: quit a couple of months ago, still smoking occasionally though  Alcohol: denies  Illicit Drugs: denies  Living situation: with wife and two kids  ADLs: independent  Works in construction management    FAMILY HISTORY:  Mother's side all has DM, including his mother and brother  Father had MI    ALLERGIES:  No Known Allergies      HOME MEDICATIONS:  atorvastatin 40 mg oral tablet: 1 tab(s) orally once a day  glipiZIDE 10 mg oral tablet: 1 tab(s) orally once a day  lisinopril 20 mg oral tablet: 1 tab(s) orally once a day  metFORMIN 1000 mg oral tablet: 1 tab(s) orally 2 times a day  ASA 81mg once daily  Plavix 75mg once daily    Vital Signs Last 24 Hrs  T(F): 97.7 (30 Sep 2021 06:16), Max: 98.2 (29 Sep 2021 11:30)  HR: 82 (30 Sep 2021 08:46) (68 - 86)  BP: 124/76 (30 Sep 2021 08:46) (110/70 - 143/85)  RR: 16 (30 Sep 2021 08:46) (12 - 20)  SpO2: 97% (30 Sep 2021 08:46) (95% - 98%)    PHYSICAL EXAM:  GENERAL: pleasant, appropriate, no acute distress, well-groomed, well-developed  HEAD:  Atraumatic, Normocephalic  EYES: EOMI, PERRLA, conjunctiva and sclera clear  ENMT: No tonsillar erythema, exudates, or enlargement; Moist mucous membranes, Good dentition  NECK: Supple, No JVD  CHEST/LUNG: Clear to auscultation bilaterally; No rales, rhonchi, wheezing, or rubs  HEART: Regular rate and rhythm; S1/S2, No murmurs, rubs, or gallops  ABDOMEN: obese, soft, Nontender, Nondistended; Bowel sounds present  VASCULAR: Normal pulses, Normal capillary refill  EXTREMITIES:  2+ Peripheral Pulses, No cyanosis, No edema  LYMPH: No lymphadenopathy noted  SKIN: Warm, Intact. R foot wrapped in kerlix without saturation  PSYCH: Normal mood and affect  NERVOUS SYSTEM:  A/O x3, CN 2-12 intact, No focal deficits    LABS:                        13.8   8.68  )-----------( 248      ( 30 Sep 2021 09:30 )             40.9     09-29    135  |  100  |  24  ----------------------------<  269  4.3   |  24  |  0.71    Ca    9.8      29 Sep 2021 05:50  Phos  3.9     09-  Mg     1.6     09-29    TPro  7.6  /  Alb  4.1  /  TBili  0.3  /  DBili  x   /  AST  19  /  ALT  36  /  AlkPhos  73  09-28    PT/INR - ( 28 Sep 2021 21:25 )   PT: 11.9 sec;   INR: 0.99          PTT - ( 28 Sep 2021 21:25 )  PTT:28.1 sec  Urinalysis Basic - ( 29 Sep 2021 04:59 )    Color: Yellow / Appearance: Clear / S.025 / pH: x  Gluc: x / Ketone: NEGATIVE  / Bili: Negative / Urobili: 0.2 E.U./dL   Blood: x / Protein: NEGATIVE mg/dL / Nitrite: NEGATIVE   Leuk Esterase: NEGATIVE / RBC: x / WBC x   Sq Epi: x / Non Sq Epi: x / Bacteria: x        Culture - Other (collected 29 Sep 2021 14:58)  Source: .Other Right Third Toe  Gram Stain (29 Sep 2021 22:10):    No organisms seen    Rare WBC's    Culture - Blood (collected 29 Sep 2021 01:52)  Source: .Blood Blood  Preliminary Report (30 Sep 2021 02:00):    No growth at 1 day.    Culture - Blood (collected 29 Sep 2021 01:52)  Source: .Blood Blood  Preliminary Report (30 Sep 2021 02:00):    No growth at 1 day.    Culture - Abscess with Gram Stain (collected 28 Sep 2021 21:46)  Source: .Abscess Leg - Left  Gram Stain (28 Sep 2021 22:40):    Few Gram Negative Rods    Few Gram positive cocci in pairs    Few WBC's  Preliminary Report (30 Sep 2021 09:17):    Moderate Gram Negative Rods Identification and susceptibility to follow.    Culture in progress      COVID-19 PCR: Negative (21 @ 21:24)      RADIOLOGY & ADDITIONAL TESTS:  reviewed, none new

## 2021-09-30 NOTE — PROCEDURE
[FreeTextEntry1] : ZORAN/PVR done today to evaluate arterial disease showing normal waveforms, no significant stenosis seen.

## 2021-09-30 NOTE — END OF VISIT
[FreeTextEntry3] : I, Dr. Thomas Cary  have read and attest that all the information, medical decision making and discharge instructions within are true and accurate. I personally performed the evaluation and management (E/M) services for this new patient.  That E/M includes conducting the initial examination, assessing all conditions, and establishing the plan of care.  Today, my ACP, Yennifer Figueroa PA-C, was here to observe my evaluation and management services for this patient to be followed going forward.\par   [Time Spent: ___ minutes] : I have spent [unfilled] minutes of time on the encounter.

## 2021-09-30 NOTE — DIETITIAN INITIAL EVALUATION ADULT. - OTHER INFO
46 y/o M with PMHx HTN, HLD, DM with neuropathy, CAD s/p stents who presents to the ED to be admitted and pre-op for right 3rd toe gangrene. S/p Amputation of right third toe 9/29. Noted plans for angiogram with attending pending wound healing progression.     Pt seen alert in room, RN present. Pt tries to follow Mediterranean diet, only consumes 1meal/day in attempts to manage wt and feels wt stable x years at 240-245 pounds. Reports BG higher then normal lately d/t infection; a1c 11.5%. Eating well at this time. NKFA. No issues chewing/swallowing. No pain. Doroteo 19. 1+BL leg edema. No pressure ulcers. Denies NVDC, BM+9/28.   Please see below for nutritions recommendations.

## 2021-09-30 NOTE — PHYSICAL EXAM
[Respiratory Effort] : normal respiratory effort [Normal Heart Sounds] : normal heart sounds [1+] : right 1+ [2+] : left 2+ [de-identified] : Well appearing [FreeTextEntry1] : right third toe with wet gangrene from the base, second toe starting to turn dusky with maceration on the plantar base of toes.

## 2021-09-30 NOTE — DIETITIAN INITIAL EVALUATION ADULT. - PERSON TAUGHT/METHOD
DM diet education. Encouraged PO/prot intake for healing./verbal instruction/patient instructed/teach back - (Patient repeats in own words)

## 2021-09-30 NOTE — CONSULT NOTE ADULT - SUBJECTIVE AND OBJECTIVE BOX
HPI: 45yMale    Age at Dx:  How dx:  Hx and duration of insulin:  Current Therapy:  Hx of hypoglycemia  Hx of DKA/HHS?    Home FSG:  Fasting  Lunch  Dinner  Bed    Hx of other regimens  Complications:  Outpatient Endo:    PMH & Surgical Hx:CELLULITIS    FH: myocardial infarction    FH: lung cancer    FH: CAD (coronary artery disease)    Handoff    MEWS Score    Type II diabetes mellitus    Obesity    Hyperlipidemia    Hypertension    Coronary artery disease    Gangrene of toe    Gangrene of toe    Diabetic foot infection    Cellulitis    Toe gangrene    Amputation of right third toe    No significant past surgical history    FOOT PAIN    90+    Cellulitis    SysAdmin_VisitLink        FH:  DM:  Thyroid:  Autoimmune:  Other:    SH:  Smoking  Etoh:  Recreational Drugs:  Social Life:    Current Meds:  acetaminophen   Tablet .. 650 milliGRAM(s) Oral every 6 hours PRN  aspirin  chewable 81 milliGRAM(s) Oral every 24 hours  atorvastatin 40 milliGRAM(s) Oral at bedtime  clopidogrel Tablet 75 milliGRAM(s) Oral every 24 hours  dextrose 40% Gel 15 Gram(s) Oral once  dextrose 5%. 1000 milliLiter(s) IV Continuous <Continuous>  dextrose 5%. 1000 milliLiter(s) IV Continuous <Continuous>  dextrose 50% Injectable 25 Gram(s) IV Push once  dextrose 50% Injectable 12.5 Gram(s) IV Push once  dextrose 50% Injectable 25 Gram(s) IV Push once  glucagon  Injectable 1 milliGRAM(s) IntraMuscular once  heparin   Injectable 5000 Unit(s) SubCutaneous every 8 hours  influenza   Vaccine 0.5 milliLiter(s) IntraMuscular once  insulin glargine Injectable (LANTUS) 12 Unit(s) SubCutaneous at bedtime  insulin lispro (ADMELOG) corrective regimen sliding scale   SubCutaneous Before meals and at bedtime  insulin lispro Injectable (ADMELOG) 5 Unit(s) SubCutaneous three times a day before meals  piperacillin/tazobactam IVPB.. 3.375 Gram(s) IV Intermittent every 6 hours  vancomycin  IVPB 1250 milliGRAM(s) IV Intermittent every 8 hours      Allergies:  No Known Allergies      ROS:  Denies the following except as indicated.    General: weight loss/weight gain, decreased appetite, fatigue  Eyes: Blurry vision, double vision, visual changes  ENT: Throat pain, changes in voice,   CV: palpitations, SOB, CP, cough  GI: NVD, difficulty swallowing, abdominal pain  : polyuria, dysuria  Endo: abnormal menses, temperature intolerance, decreased libido  MSK: weakness, joint pain  Skin: rash, dryness, diaphoresis  Heme: Easy bruising,bleeding  Neuro: HA, dizziness, lightheadedness, numbness tingling  Psych: Anxiety, Depression    Vital Signs Last 24 Hrs  T(C): 36.2 (30 Sep 2021 09:58), Max: 36.8 (29 Sep 2021 11:30)  T(F): 97.2 (30 Sep 2021 09:58), Max: 98.2 (29 Sep 2021 11:30)  HR: 82 (30 Sep 2021 08:46) (68 - 86)  BP: 124/76 (30 Sep 2021 08:46) (110/70 - 143/85)  BP(mean): 88 (29 Sep 2021 14:10) (85 - 92)  RR: 16 (30 Sep 2021 08:46) (12 - 20)  SpO2: 97% (30 Sep 2021 08:46) (95% - 98%)  Height (cm): 190.5 ( @ 20:10)  Weight (kg): 111.1 ( @ 11:44)  BMI (kg/m2): 30.6 ( @ 11:44)      Constitutional: wn/wd in NAD.   HEENT: NCAT, MMM, OP clear, EOMI, , no proptosis or lid retraction  Neck: no thyromegaly or palpable thyroid nodules   Respiratory: lungs CTAB.  Cardiovascular: regular rhythm, normal S1 and S2, no audible murmurs, no peripheral edema  GI: soft, NT/ND, no masses/HSM appreciated.  Neurology: no tremors, DTR 2+  Skin: no visible rashes/lesions  Psychiatric: AAO x 3, normal affect/mood.  Ext: radial pulses intact, DP pulses intact, extremities warm, no cyanosis, clubbing or edema.       LABS:                        13.8   8.68  )-----------( 248      ( 30 Sep 2021 09:30 )             40.9         134<L>  |  99  |  15  ----------------------------<  266<H>  4.6   |  26  |  0.56    Ca    9.9      30 Sep 2021 09:30  Phos  2.8     09-30  Mg     1.7     -30    TPro  7.6  /  Alb  4.1  /  TBili  0.3  /  DBili  x   /  AST  19  /  ALT  36  /  AlkPhos  73  -    PT/INR - ( 28 Sep 2021 21:25 )   PT: 11.9 sec;   INR: 0.99          PTT - ( 28 Sep 2021 21:25 )  PTT:28.1 sec  Urinalysis Basic - ( 29 Sep 2021 04:59 )    Color: Yellow / Appearance: Clear / S.025 / pH: x  Gluc: x / Ketone: NEGATIVE  / Bili: Negative / Urobili: 0.2 E.U./dL   Blood: x / Protein: NEGATIVE mg/dL / Nitrite: NEGATIVE   Leuk Esterase: NEGATIVE / RBC: x / WBC x   Sq Epi: x / Non Sq Epi: x / Bacteria: x            RADIOLOGY & ADDITIONAL STUDIES:  CAPILLARY BLOOD GLUCOSE      POCT Blood Glucose.: 322 mg/dL (30 Sep 2021 06:36)  POCT Blood Glucose.: 268 mg/dL (29 Sep 2021 22:24)  POCT Blood Glucose.: 153 mg/dL (29 Sep 2021 16:14)  POCT Blood Glucose.: 194 mg/dL (29 Sep 2021 13:54)  POCT Blood Glucose.: 213 mg/dL (29 Sep 2021 11:20)        A/P:45y Male    1.  DM  Please continue lantus       units at night / morning.  Please continue lispro      units before each meal.  Please continue lispro moderate / low dose sliding scale four times daily with meals and at bedtime    Pt's fingerstick glucose goal is     Will continue to monitor     For discharge, pt can continue    Pt can follow up at discharge with [facility] by calling  to make an appointment.   Will discuss case with     and update primary team HPI: Pt is a 46 y/o M with PMHx HTN, HLD, DM with neuropathy, CAD s/p stents who presents to the ED to be admitted and pre-op for right 3rd toe gangrene. Pt states that about 3 weeks ago he accidentally stepped on a nail.  He saw a podiatrist about 10 days ago after having subjective fever and chills, and he was started on Augmentin.  He saw the podiatrist recently for his second appointment at which time the toe turned black and so he was referred to see Dr. Cary for further evaluation.  Pt was seen at Dr. Cary's office today and was referred to the ED to be admitted and pre-op for right 3rd toe amputation. He denies fever, chills, chest pain, SOB, dizziness.     Endocrinology has been consulted for DM management     Age at Dx:  How dx:  Hx and duration of insulin:  Current Therapy:  Hx of hypoglycemia  Hx of DKA/HHS?    Home FSG:  Fasting  Lunch  Dinner  Bed    Hx of other regimens  Complications:  Outpatient Endo:    PMH & Surgical Hx:CELLULITIS    FH: myocardial infarction    FH: lung cancer    FH: CAD (coronary artery disease)    Handoff    MEWS Score    Type II diabetes mellitus    Obesity    Hyperlipidemia    Hypertension    Coronary artery disease    Gangrene of toe    Gangrene of toe    Diabetic foot infection    Cellulitis    Toe gangrene    Amputation of right third toe    No significant past surgical history    FOOT PAIN    90+    Cellulitis    SysAdmin_VisitLink        FH:  DM:  Thyroid:  Autoimmune:  Other:    SH:  Smoking  Etoh:  Recreational Drugs:  Social Life:    Current Meds:  acetaminophen   Tablet .. 650 milliGRAM(s) Oral every 6 hours PRN  aspirin  chewable 81 milliGRAM(s) Oral every 24 hours  atorvastatin 40 milliGRAM(s) Oral at bedtime  clopidogrel Tablet 75 milliGRAM(s) Oral every 24 hours  dextrose 40% Gel 15 Gram(s) Oral once  dextrose 5%. 1000 milliLiter(s) IV Continuous <Continuous>  dextrose 5%. 1000 milliLiter(s) IV Continuous <Continuous>  dextrose 50% Injectable 25 Gram(s) IV Push once  dextrose 50% Injectable 12.5 Gram(s) IV Push once  dextrose 50% Injectable 25 Gram(s) IV Push once  glucagon  Injectable 1 milliGRAM(s) IntraMuscular once  heparin   Injectable 5000 Unit(s) SubCutaneous every 8 hours  influenza   Vaccine 0.5 milliLiter(s) IntraMuscular once  insulin glargine Injectable (LANTUS) 12 Unit(s) SubCutaneous at bedtime  insulin lispro (ADMELOG) corrective regimen sliding scale   SubCutaneous Before meals and at bedtime  insulin lispro Injectable (ADMELOG) 5 Unit(s) SubCutaneous three times a day before meals  piperacillin/tazobactam IVPB.. 3.375 Gram(s) IV Intermittent every 6 hours  vancomycin  IVPB 1250 milliGRAM(s) IV Intermittent every 8 hours      Allergies:  No Known Allergies      ROS:  Denies the following except as indicated.    General: weight loss/weight gain, decreased appetite, fatigue  Eyes: Blurry vision, double vision, visual changes  ENT: Throat pain, changes in voice,   CV: palpitations, SOB, CP, cough  GI: NVD, difficulty swallowing, abdominal pain  : polyuria, dysuria  Endo: abnormal menses, temperature intolerance, decreased libido  MSK: weakness, joint pain  Skin: rash, dryness, diaphoresis  Heme: Easy bruising,bleeding  Neuro: HA, dizziness, lightheadedness, numbness tingling  Psych: Anxiety, Depression    Vital Signs Last 24 Hrs  T(C): 36.2 (30 Sep 2021 09:58), Max: 36.8 (29 Sep 2021 11:30)  T(F): 97.2 (30 Sep 2021 09:58), Max: 98.2 (29 Sep 2021 11:30)  HR: 82 (30 Sep 2021 08:46) (68 - 86)  BP: 124/76 (30 Sep 2021 08:46) (110/70 - 143/85)  BP(mean): 88 (29 Sep 2021 14:10) (85 - 92)  RR: 16 (30 Sep 2021 08:46) (12 - 20)  SpO2: 97% (30 Sep 2021 08:46) (95% - 98%)  Height (cm): 190.5 ( @ 20:10)  Weight (kg): 111.1 ( @ 11:44)  BMI (kg/m2): 30.6 ( @ 11:44)      Constitutional: wn/wd in NAD.   HEENT: NCAT, MMM, OP clear, EOMI, , no proptosis or lid retraction  Neck: no thyromegaly or palpable thyroid nodules   Respiratory: lungs CTAB.  Cardiovascular: regular rhythm, normal S1 and S2, no audible murmurs, no peripheral edema  GI: soft, NT/ND, no masses/HSM appreciated.  Neurology: no tremors, DTR 2+  Skin: no visible rashes/lesions  Psychiatric: AAO x 3, normal affect/mood.  Ext: radial pulses intact, DP pulses intact, extremities warm, no cyanosis, clubbing or edema.       LABS:                        13.8   8.68  )-----------( 248      ( 30 Sep 2021 09:30 )             40.9     30    134<L>  |  99  |  15  ----------------------------<  266<H>  4.6   |  26  |  0.56    Ca    9.9      30 Sep 2021 09:30  Phos  2.8       Mg     1.7         TPro  7.6  /  Alb  4.1  /  TBili  0.3  /  DBili  x   /  AST  19  /  ALT  36  /  AlkPhos  73  -28    PT/INR - ( 28 Sep 2021 21:25 )   PT: 11.9 sec;   INR: 0.99          PTT - ( 28 Sep 2021 21:25 )  PTT:28.1 sec  Urinalysis Basic - ( 29 Sep 2021 04:59 )    Color: Yellow / Appearance: Clear / S.025 / pH: x  Gluc: x / Ketone: NEGATIVE  / Bili: Negative / Urobili: 0.2 E.U./dL   Blood: x / Protein: NEGATIVE mg/dL / Nitrite: NEGATIVE   Leuk Esterase: NEGATIVE / RBC: x / WBC x   Sq Epi: x / Non Sq Epi: x / Bacteria: x            RADIOLOGY & ADDITIONAL STUDIES:  CAPILLARY BLOOD GLUCOSE      POCT Blood Glucose.: 322 mg/dL (30 Sep 2021 06:36)  POCT Blood Glucose.: 268 mg/dL (29 Sep 2021 22:24)  POCT Blood Glucose.: 153 mg/dL (29 Sep 2021 16:14)  POCT Blood Glucose.: 194 mg/dL (29 Sep 2021 13:54)  POCT Blood Glucose.: 213 mg/dL (29 Sep 2021 11:20)        A/P: Pt is a 46 y/o M with PMHx HTN, HLD, DM with neuropathy, CAD s/p stents who presents to the ED to be admitted and pre-op for right 3rd toe gangrene. Pt states that about 3 weeks ago he accidentally stepped on a nail. s/p right 3rd toe amputation 21.    Wt 111.1 kg  A1c 11.5  Cr 0.56      1.  DM  Please continue lantus       units at night / morning.  Please continue lispro      units before each meal.  Please continue lispro moderate / low dose sliding scale four times daily with meals and at bedtime    Pt's fingerstick glucose goal is     Will continue to monitor     For discharge, pt can continue    Pt can follow up at discharge with [facility] by calling  to make an appointment.   Will discuss case with     and update primary team HPI: Pt is a 44 y/o M with PMHx HTN, HLD, DM with neuropathy, CAD s/p stents who presents to the ED to be admitted and pre-op for right 3rd toe gangrene. Pt states that about 3 weeks ago he accidentally stepped on a nail.  He saw a podiatrist about 10 days ago after having subjective fever and chills, and he was started on Augmentin.  He saw the podiatrist recently for his second appointment at which time the toe turned black and so he was referred to see Dr. Cary for further evaluation.  Pt was seen at Dr. Cary's office today and was referred to the ED to be admitted and pre-op for right 3rd toe amputation. He denies fever, chills, chest pain, SOB, dizziness.     Endocrinology has been consulted for DM management     Age at Dx: At the age of 18yrs  How dx: By PCP as pt was having symp of glycemic toxicity.  Hx and duration of insulin: Has been on insulin for a while approx 3 yrs ago but was discontinued due to GI disturbances. Has been on ozempic in the past but was discontinued due to GI disturbances.   Current Therapy: Metformin 1000mg BID and Glipizide 10mg QD.  Hx of DKA/HHS, Never    Home FSG: Does not check regularly but when does fastin ranges from 200-300mg/dl    Hx of other regimens  Complications: lower extremity numbness      PMH & Surgical Hx:CELLULITIS    FH: myocardial infarction    FH: lung cancer    FH: CAD (coronary artery disease)    Handoff    MEWS Score    Type II diabetes mellitus    Obesity    Hyperlipidemia    Hypertension    Coronary artery disease    Gangrene of toe    Gangrene of toe    Diabetic foot infection    Cellulitis    Toe gangrene    Amputation of right third toe    No significant past surgical history    FOOT PAIN    90+    Cellulitis    SysAdmin_VisitLink        FH:  DM: Parents and siblings.   Thyroid: None  Autoimmune: None      SH:  Smoking, quit smoking 2-3 mnths ago, smoked for 25 yrs, 10-20 smokes per day.  Etoh: Occasionally   Recreational Drugs: None      Current Meds:  acetaminophen   Tablet .. 650 milliGRAM(s) Oral every 6 hours PRN  aspirin  chewable 81 milliGRAM(s) Oral every 24 hours  atorvastatin 40 milliGRAM(s) Oral at bedtime  clopidogrel Tablet 75 milliGRAM(s) Oral every 24 hours  dextrose 40% Gel 15 Gram(s) Oral once  dextrose 5%. 1000 milliLiter(s) IV Continuous <Continuous>  dextrose 5%. 1000 milliLiter(s) IV Continuous <Continuous>  dextrose 50% Injectable 25 Gram(s) IV Push once  dextrose 50% Injectable 12.5 Gram(s) IV Push once  dextrose 50% Injectable 25 Gram(s) IV Push once  glucagon  Injectable 1 milliGRAM(s) IntraMuscular once  heparin   Injectable 5000 Unit(s) SubCutaneous every 8 hours  influenza   Vaccine 0.5 milliLiter(s) IntraMuscular once  insulin glargine Injectable (LANTUS) 12 Unit(s) SubCutaneous at bedtime  insulin lispro (ADMELOG) corrective regimen sliding scale   SubCutaneous Before meals and at bedtime  insulin lispro Injectable (ADMELOG) 5 Unit(s) SubCutaneous three times a day before meals  piperacillin/tazobactam IVPB.. 3.375 Gram(s) IV Intermittent every 6 hours  vancomycin  IVPB 1250 milliGRAM(s) IV Intermittent every 8 hours      Allergies:  No Known Allergies      ROS:  Denies the following except as indicated.    General: weight loss/weight gain, decreased appetite, fatigue  Eyes: Blurry vision, double vision, visual changes  ENT: Throat pain, changes in voice,   CV: palpitations, SOB, CP, cough  GI: NVD, difficulty swallowing, abdominal pain  : polyuria, dysuria  Endo: temperature intolerance  MSK: weakness, joint pain  Skin: rash, dryness, diaphoresis  Heme: Easy bruising,bleeding  Neuro: HA, dizziness, lightheadedness, Reports of numbness tingling  Psych: Anxiety, Depression    Vital Signs Last 24 Hrs  T(C): 36.2 (30 Sep 2021 09:58), Max: 36.8 (29 Sep 2021 11:30)  T(F): 97.2 (30 Sep 2021 09:58), Max: 98.2 (29 Sep 2021 11:30)  HR: 82 (30 Sep 2021 08:46) (68 - 86)  BP: 124/76 (30 Sep 2021 08:46) (110/70 - 143/85)  BP(mean): 88 (29 Sep 2021 14:10) (85 - 92)  RR: 16 (30 Sep 2021 08:46) (12 - 20)  SpO2: 97% (30 Sep 2021 08:46) (95% - 98%)  Height (cm): 190.5 ( @ 20:10)  Weight (kg): 111.1 ( @ 11:44)  BMI (kg/m2): 30.6 ( @ 11:44)      Constitutional: wn/wd in NAD.   HEENT: NCAT, MMM, OP clear, EOMI, , no proptosis or lid retraction  Neck: no thyromegaly or palpable thyroid nodules   Respiratory: lungs CTAB.  Cardiovascular: regular rhythm, normal S1 and S2, no audible murmurs, no peripheral edema  GI: soft, NT/ND, no masses/HSM appreciated.  Neurology: no tremors, DTR 2+  Skin: Dressing over the Rt foot  Psychiatric: AAO x 3, normal affect/mood.  Ext: radial pulses intact, Dressing over the Rt foot  LABS:                        13.8   8.68  )-----------( 248      ( 30 Sep 2021 09:30 )             40.9     09-30    134<L>  |  99  |  15  ----------------------------<  266<H>  4.6   |  26  |  0.56    Ca    9.9      30 Sep 2021 09:30  Phos  2.8     09-30  Mg     1.7     -30    TPro  7.6  /  Alb  4.1  /  TBili  0.3  /  DBili  x   /  AST  19  /  ALT  36  /  AlkPhos  73  09-28    PT/INR - ( 28 Sep 2021 21:25 )   PT: 11.9 sec;   INR: 0.99          PTT - ( 28 Sep 2021 21:25 )  PTT:28.1 sec  Urinalysis Basic - ( 29 Sep 2021 04:59 )    Color: Yellow / Appearance: Clear / S.025 / pH: x  Gluc: x / Ketone: NEGATIVE  / Bili: Negative / Urobili: 0.2 E.U./dL   Blood: x / Protein: NEGATIVE mg/dL / Nitrite: NEGATIVE   Leuk Esterase: NEGATIVE / RBC: x / WBC x   Sq Epi: x / Non Sq Epi: x / Bacteria: x            RADIOLOGY & ADDITIONAL STUDIES:  CAPILLARY BLOOD GLUCOSE      POCT Blood Glucose.: 322 mg/dL (30 Sep 2021 06:36)  POCT Blood Glucose.: 268 mg/dL (29 Sep 2021 22:24)  POCT Blood Glucose.: 153 mg/dL (29 Sep 2021 16:14)  POCT Blood Glucose.: 194 mg/dL (29 Sep 2021 13:54)  POCT Blood Glucose.: 213 mg/dL (29 Sep 2021 11:20)        A/P: Pt is a 44 y/o M with PMHx HTN, HLD, DM with neuropathy, CAD s/p stents who presents to the ED to be admitted and pre-op for right 3rd toe gangrene. Pt states that about 3 weeks ago he accidentally stepped on a nail. s/p right 3rd toe amputation 21.    Wt 111.1 kg  A1c 11.5  Cr 0.56      1.  DM  Please continue lantus 25  units at night.  Please continue lispro 8 units before each meal.  Please continue lispro moderate dose sliding scale four times daily with meals and at bedtime    Pt's fingerstick glucose goal is 100-180 mg/dl    Will continue to monitor     For discharge, pt can continue    Pt can follow up at discharge with [facility] by calling  to make an appointment.   Will discuss case with     and update primary team

## 2021-09-30 NOTE — CONSULT NOTE ADULT - ATTENDING COMMENTS
Pt seen on rounds this afternoon.  45-yo man with long history of type 2 DM, poorly controlled on outpatient regimen of metformin and glipizide, admitted with gangrene of R 3rd toe and is now s/p amputation.  Glycemic control has been poor despite what appears to be good dietary compliance.  Fingersticks at home have remained in the 200-300 range, which would be concordant with his A1c of 11.5%.  He almost certainly needs insulin therapy at this point, and he understands this.  Will increase the current inpatient regimen to 25 Lantus qhs/pre-meal lispro 8 units  He appears quite motivated to improve his control at this point

## 2021-09-30 NOTE — CONSULT NOTE ADULT - ASSESSMENT
45YOM with history of obesity (BMI 30.6), type 2 diabetes (a1c this admission 11.5%, not on insulin), essential HTN, CAD s/p PCI, HLD admitted to vascular surgery service for gangrene of R 3rd toe after stepping on nail at work s/p amputation 9/29.     R 3rd toe gangrene  Diabetic foot infection  Occurred after stepping on nail at work. s/p amputation of R 3rd toe 9/29  -received tetanus 9/28  -on vanco/zosyn for now  -f/u OR cultures and blood cultures    Type 2 diabetes  Hgb a1c 11.5% this admission. Has endocrinologist but she moved and has been mostly communicating via phone - he would like to switch endocrinologist.  -home glipizide and metformin on hold  -he will likely need to start insulin - recommend endocrine consult  -FSBG qac/qhs with SSI    Essential hypertension  Takes lisinopril 20mg once daily at home. BP at goal  -continue home meds    CAD s/p PCI - continue home ASA/statin/plavix  Hyperlipidemia - continue home statin  Obesity - BMI is 30.6, affects all aspects of care     Dispo: possibly to home in 24-48 hours per primary team 45YOM with history of obesity (BMI 30.6), type 2 diabetes (a1c this admission 11.5%, not on insulin), essential HTN, CAD s/p PCI, HLD admitted to vascular surgery service for gangrene of R 3rd toe after stepping on nail at work s/p amputation 9/29.     R 3rd toe gangrene  Diabetic foot infection  Occurred after stepping on nail at work. s/p amputation of R 3rd toe 9/29  -received tetanus 9/28  -on vanco/zosyn for now  -f/u OR cultures and blood cultures    Type 2 diabetes  Hgb a1c 11.5% this admission. Has endocrinologist but she moved and has been mostly communicating via phone - he would like to switch endocrinologist.  -home glipizide and metformin on hold  -he will likely need to start insulin - recommend endocrine consult; started on Lantus 12U qhs last night, defer to endocrine for further titration  -FSBG qac/qhs with SSI    Essential hypertension  Takes lisinopril 20mg once daily at home. BP at goal  -continue home meds    CAD s/p PCI - continue home ASA/statin/plavix  Hyperlipidemia - continue home statin  Obesity - BMI is 30.6, affects all aspects of care     Dispo: possibly to home in 24-48 hours per primary team

## 2021-09-30 NOTE — PROGRESS NOTE ADULT - SUBJECTIVE AND OBJECTIVE BOX
O/N: JIM: VSS                                    --------------------------------------------------------------------------  PLEASE CHECK WHEN PRESENT:     [  ]Heart Failure     [  ] Acute     [  ] Acute on Chronic     [  ] Chronic  -------------------------------------------------------------------     [  ]Diastolic [HFpEF]     [  ]Systolic [HFrEF]     [  ]Combined [HFpEF & HFrEF]  .................................................................................     [  ]Other:     [ ] Pulmonary Hypertension     [ ] Afib     [x ] Hypertensive Heart Disease  -------------------------------------------------------------------  [ ] Respiratory failure  [ ] Acute cor pulmonale  [ ] Asthma/COPD Exacerbation  [ ] Pleural effusion  [ ] Aspiration pneumonia  [ ] Obstructive Sleep Apnea  -------------------------------------------------------------------  [  ]ROSANNE     [  ]ATN     [  ]Reneal Medullary Necrosis     [  ]Renal Cortical Necrosis     [  ]Other Pathological Lesions:    [  ]CKD 1  [  ]CKD 2  [  ]CKD 3  [  ]CKD 4  [  ]CKD 5  [  ]Other  -------------------------------------------------------------------  [ x ]Diabetes  [  ] Diabetic PVD Ulcer  [ x ] Neuropathic ulcer to DM  [  ] Diabetes with Nephropathy  [  ] Osteomyelitis due to diabetes  [  ] Hyperglycemia   [  ]hypoglycemia   --------------------------------------------------------------------  [  ]Malnutrition: See Nutrition Note  [  ]Cachexia  [  ]Other:   [  ]Supplement Ordered:  [  ]Morbid Obesity (BMI >=40]  ---------------------------------------------------------------------  [ ] Sepsis/severe sepsis/septic shock  [ ] Noninfectious SIRS  [ ] UTI  [ ] Pneumonia  -----------------------------------------------------------------------  [ ] Acidosis/alkalosis  [ ] Fluid overload  [ ] Hypokalemia  [ ] Hyperkalemia  [ ] Hypomagnesemia  [ ] Hypophosphatemia  [ ] Hyperphosphatemia  ------------------------------------------------------------------------  [ ] Acute blood loss anemia  [ ] Post op blood loss anemia  [ ] Iron deficiency anemia  [ ] Anemia due to chronic disease  [ ] Hypercoagulable state  [ ] Thrombocytopenia  ----------------------------------------------------------------------  [ ] Cerebral infarction  [ ] Transient ischemia attack  [ ] Encephalopathy - Toxic or Metabolic      A/P: 45y YO Male w/ Hx of HTN, HLD, DM, CAD who presents from Dr. Alvarez office with right 3rd toe gangrene after stepping on a nail 3 weeks ago at work       Vascular/PAD:  -Doppler tones noted in right foot   -Continue ASA+ plavix   -will discuss plans for angiogram with attending pending wound healing progression     HLD:  -c/w Storvastatin 40    HTN:   - Holding home Lisinopril 20    CAD/CHF:   -Hx of CAD s/p stents at OSH   -Continue ASA+plavix and statin     DM:  -HgA1c 11  - ISS   -will complete Medication reconciliation   -Start Insulin as needed     ID:  -Rt foot 3rd toe gangrene   -NPO for Possible Toe amputation today + NS@75  -Continue Vanc+ zosyn for now   -f/u wound cx   -f/u blood cx     DVTPPx:H              O/N: JIM: VSS      Patient is a 45y old  Male who presents with a chief complaint of  gangrene of right 3rd toe           MEDICATIONS  (STANDING):  aspirin  chewable 81 milliGRAM(s) Oral every 24 hours  atorvastatin 40 milliGRAM(s) Oral at bedtime  clopidogrel Tablet 75 milliGRAM(s) Oral every 24 hours  dextrose 40% Gel 15 Gram(s) Oral once  dextrose 5%. 1000 milliLiter(s) (50 mL/Hr) IV Continuous <Continuous>  dextrose 5%. 1000 milliLiter(s) (100 mL/Hr) IV Continuous <Continuous>  dextrose 50% Injectable 25 Gram(s) IV Push once  dextrose 50% Injectable 12.5 Gram(s) IV Push once  dextrose 50% Injectable 25 Gram(s) IV Push once  glucagon  Injectable 1 milliGRAM(s) IntraMuscular once  heparin   Injectable 5000 Unit(s) SubCutaneous every 8 hours  influenza   Vaccine 0.5 milliLiter(s) IntraMuscular once  insulin glargine Injectable (LANTUS) 12 Unit(s) SubCutaneous at bedtime  insulin lispro (ADMELOG) corrective regimen sliding scale   SubCutaneous Before meals and at bedtime  piperacillin/tazobactam IVPB.. 3.375 Gram(s) IV Intermittent every 6 hours  vancomycin  IVPB 1250 milliGRAM(s) IV Intermittent every 12 hours    MEDICATIONS  (PRN):  acetaminophen   Tablet .. 650 milliGRAM(s) Oral every 6 hours PRN Temp greater or equal to 38C (100.4F), Mild Pain (1 - 3)      Allergies    No Known Allergies    Intolerances          Vital Signs Last 24 Hrs  T(C): 36.5 (30 Sep 2021 06:16), Max: 36.8 (29 Sep 2021 11:30)  T(F): 97.7 (30 Sep 2021 06:16), Max: 98.2 (29 Sep 2021 11:30)  HR: 80 (30 Sep 2021 05:25) (68 - 86)  BP: 120/78 (30 Sep 2021 05:25) (110/70 - 143/85)  BP(mean): 88 (29 Sep 2021 14:10) (85 - 92)  RR: 20 (30 Sep 2021 05:25) (12 - 20)  SpO2: 98% (30 Sep 2021 05:25) (95% - 98%)  CAPILLARY BLOOD GLUCOSE      POCT Blood Glucose.: 322 mg/dL (30 Sep 2021 06:36)  POCT Blood Glucose.: 268 mg/dL (29 Sep 2021 22:24)  POCT Blood Glucose.: 153 mg/dL (29 Sep 2021 16:14)  POCT Blood Glucose.: 194 mg/dL (29 Sep 2021 13:54)  POCT Blood Glucose.: 213 mg/dL (29 Sep 2021 11:20)       @ 07:01  -   @ 07:00  --------------------------------------------------------  IN: 450 mL / OUT: 1480 mL / NET: -1030 mL        Physical Exam:  General: NAD  Pulmonary: b/l breath sounds   Cardiovascular: s1s2 Regular   Abdominal: soft   Extremities: Right 3rd toe amputation site with pink tissue, no drainage, no purulent discharge. Calf soft and non  tendern. Right foot with mild edema.  Right DP/PT Biphasic doppler tones       LABS:                        13.3   8.77  )-----------( 261      ( 29 Sep 2021 05:50 )             39.8         135  |  100  |  24<H>  ----------------------------<  269<H>  4.3   |  24  |  0.71    Ca    9.8      29 Sep 2021 05:50  Phos  3.9       Mg     1.6         TPro  7.6  /  Alb  4.1  /  TBili  0.3  /  DBili  x   /  AST  19  /  ALT  36  /  AlkPhos  73      PT/INR - ( 28 Sep 2021 21:25 )   PT: 11.9 sec;   INR: 0.99          PTT - ( 28 Sep 2021 21:25 )  PTT:28.1 sec  Urinalysis Basic - ( 29 Sep 2021 04:59 )    Color: Yellow / Appearance: Clear / S.025 / pH: x  Gluc: x / Ketone: NEGATIVE  / Bili: Negative / Urobili: 0.2 E.U./dL   Blood: x / Protein: NEGATIVE mg/dL / Nitrite: NEGATIVE   Leuk Esterase: NEGATIVE / RBC: x / WBC x   Sq Epi: x / Non Sq Epi: x / Bacteria: x              --------------------------------------------------------------------------  PLEASE CHECK WHEN PRESENT:     [  ]Heart Failure     [  ] Acute     [  ] Acute on Chronic     [  ] Chronic  -------------------------------------------------------------------     [  ]Diastolic [HFpEF]     [  ]Systolic [HFrEF]     [  ]Combined [HFpEF & HFrEF]  .................................................................................     [  ]Other:     [ ] Pulmonary Hypertension     [ ] Afib     [x ] Hypertensive Heart Disease  -------------------------------------------------------------------  [ ] Respiratory failure  [ ] Acute cor pulmonale  [ ] Asthma/COPD Exacerbation  [ ] Pleural effusion  [ ] Aspiration pneumonia  [ ] Obstructive Sleep Apnea  -------------------------------------------------------------------  [  ]ROSANNE     [  ]ATN     [  ]Reneal Medullary Necrosis     [  ]Renal Cortical Necrosis     [  ]Other Pathological Lesions:    [  ]CKD 1  [  ]CKD 2  [  ]CKD 3  [  ]CKD 4  [  ]CKD 5  [  ]Other  -------------------------------------------------------------------  [ x ]Diabetes  [  ] Diabetic PVD Ulcer  [ x ] Neuropathic ulcer to DM  [  ] Diabetes with Nephropathy  [  ] Osteomyelitis due to diabetes  [  ] Hyperglycemia   [  ]hypoglycemia   --------------------------------------------------------------------  [  ]Malnutrition: See Nutrition Note  [  ]Cachexia  [  ]Other:   [  ]Supplement Ordered:  [  ]Morbid Obesity (BMI >=40]  ---------------------------------------------------------------------  [ ] Sepsis/severe sepsis/septic shock  [ ] Noninfectious SIRS  [ ] UTI  [ ] Pneumonia  -----------------------------------------------------------------------  [ ] Acidosis/alkalosis  [ ] Fluid overload  [ ] Hypokalemia  [ ] Hyperkalemia  [ ] Hypomagnesemia  [ ] Hypophosphatemia  [ ] Hyperphosphatemia  ------------------------------------------------------------------------  [ ] Acute blood loss anemia  [ ] Post op blood loss anemia  [ ] Iron deficiency anemia  [ ] Anemia due to chronic disease  [ ] Hypercoagulable state  [ ] Thrombocytopenia  ----------------------------------------------------------------------  [ ] Cerebral infarction  [ ] Transient ischemia attack  [ ] Encephalopathy - Toxic or Metabolic      A/P: 45y YO Male w/ Hx of HTN, HLD, DM, CAD who presents from Dr. Cary's office with right 3rd toe gangrene after stepping on a nail 3 weeks ago at , now s/p right 3rd toe amputation        Vascular/PAD:  -s/p right 3rd toe amputation   -Doppler tones noted in right foot   -Continue ASA+ plavix   -Will discuss plans for angiogram with attending pending wound healing progression     HLD:  -c/w Atorvastatin 40    HTN:   - Holding home Lisinopril 20    CAD/CHF:   -Hx of CAD s/p stents at OSH   -Continue ASA  + Plavix and statin     DM:  -HgA1c 11  -ISS   -Start Insulin as needed   -Endocrine consult     ID:  -Rt foot 3rd toe gangrene s/p amputation   -Continue Vanc+ zosyn for now   -f/u wound cx   -f/u blood cx     DVT PPx: SQH

## 2021-10-01 ENCOUNTER — TRANSCRIPTION ENCOUNTER (OUTPATIENT)
Age: 46
End: 2021-10-01

## 2021-10-01 DIAGNOSIS — R06.00 DYSPNEA, UNSPECIFIED: ICD-10-CM

## 2021-10-01 DIAGNOSIS — E11.9 TYPE 2 DIABETES MELLITUS WITHOUT COMPLICATIONS: ICD-10-CM

## 2021-10-01 LAB
-  AMIKACIN: SIGNIFICANT CHANGE UP
-  AZTREONAM: SIGNIFICANT CHANGE UP
-  CEFEPIME: SIGNIFICANT CHANGE UP
-  CEFTRIAXONE: SIGNIFICANT CHANGE UP
-  CIPROFLOXACIN: SIGNIFICANT CHANGE UP
-  GENTAMICIN: SIGNIFICANT CHANGE UP
-  LEVOFLOXACIN: SIGNIFICANT CHANGE UP
-  MEROPENEM: SIGNIFICANT CHANGE UP
-  PIPERACILLIN/TAZOBACTAM: SIGNIFICANT CHANGE UP
-  TOBRAMYCIN: SIGNIFICANT CHANGE UP
-  TRIMETHOPRIM/SULFAMETHOXAZOLE: SIGNIFICANT CHANGE UP
ANION GAP SERPL CALC-SCNC: 9 MMOL/L — SIGNIFICANT CHANGE UP (ref 5–17)
BUN SERPL-MCNC: 11 MG/DL — SIGNIFICANT CHANGE UP (ref 7–23)
CALCIUM SERPL-MCNC: 9.5 MG/DL — SIGNIFICANT CHANGE UP (ref 8.4–10.5)
CHLORIDE SERPL-SCNC: 100 MMOL/L — SIGNIFICANT CHANGE UP (ref 96–108)
CO2 SERPL-SCNC: 26 MMOL/L — SIGNIFICANT CHANGE UP (ref 22–31)
CREAT SERPL-MCNC: 0.59 MG/DL — SIGNIFICANT CHANGE UP (ref 0.5–1.3)
CULTURE RESULTS: SIGNIFICANT CHANGE UP
GLUCOSE BLDC GLUCOMTR-MCNC: 117 MG/DL — HIGH (ref 70–99)
GLUCOSE BLDC GLUCOMTR-MCNC: 161 MG/DL — HIGH (ref 70–99)
GLUCOSE BLDC GLUCOMTR-MCNC: 173 MG/DL — HIGH (ref 70–99)
GLUCOSE BLDC GLUCOMTR-MCNC: 196 MG/DL — HIGH (ref 70–99)
GLUCOSE BLDC GLUCOMTR-MCNC: 206 MG/DL — HIGH (ref 70–99)
GLUCOSE BLDC GLUCOMTR-MCNC: 212 MG/DL — HIGH (ref 70–99)
GLUCOSE SERPL-MCNC: 198 MG/DL — HIGH (ref 70–99)
HCT VFR BLD CALC: 40.4 % — SIGNIFICANT CHANGE UP (ref 39–50)
HGB BLD-MCNC: 13.8 G/DL — SIGNIFICANT CHANGE UP (ref 13–17)
MAGNESIUM SERPL-MCNC: 1.9 MG/DL — SIGNIFICANT CHANGE UP (ref 1.6–2.6)
MCHC RBC-ENTMCNC: 30.9 PG — SIGNIFICANT CHANGE UP (ref 27–34)
MCHC RBC-ENTMCNC: 34.2 GM/DL — SIGNIFICANT CHANGE UP (ref 32–36)
MCV RBC AUTO: 90.4 FL — SIGNIFICANT CHANGE UP (ref 80–100)
METHOD TYPE: SIGNIFICANT CHANGE UP
NRBC # BLD: 0 /100 WBCS — SIGNIFICANT CHANGE UP (ref 0–0)
ORGANISM # SPEC MICROSCOPIC CNT: SIGNIFICANT CHANGE UP
ORGANISM # SPEC MICROSCOPIC CNT: SIGNIFICANT CHANGE UP
PHOSPHATE SERPL-MCNC: 3.5 MG/DL — SIGNIFICANT CHANGE UP (ref 2.5–4.5)
PLATELET # BLD AUTO: 259 K/UL — SIGNIFICANT CHANGE UP (ref 150–400)
POTASSIUM SERPL-MCNC: SIGNIFICANT CHANGE UP MMOL/L (ref 3.5–5.3)
POTASSIUM SERPL-SCNC: SIGNIFICANT CHANGE UP MMOL/L (ref 3.5–5.3)
RBC # BLD: 4.47 M/UL — SIGNIFICANT CHANGE UP (ref 4.2–5.8)
RBC # FLD: 12.1 % — SIGNIFICANT CHANGE UP (ref 10.3–14.5)
SODIUM SERPL-SCNC: 135 MMOL/L — SIGNIFICANT CHANGE UP (ref 135–145)
SPECIMEN SOURCE: SIGNIFICANT CHANGE UP
VANCOMYCIN TROUGH SERPL-MCNC: 20.4 UG/ML — HIGH (ref 10–20)
WBC # BLD: 7.09 K/UL — SIGNIFICANT CHANGE UP (ref 3.8–10.5)
WBC # FLD AUTO: 7.09 K/UL — SIGNIFICANT CHANGE UP (ref 3.8–10.5)

## 2021-10-01 PROCEDURE — 99223 1ST HOSP IP/OBS HIGH 75: CPT

## 2021-10-01 PROCEDURE — 99233 SBSQ HOSP IP/OBS HIGH 50: CPT

## 2021-10-01 PROCEDURE — 99231 SBSQ HOSP IP/OBS SF/LOW 25: CPT | Mod: GC

## 2021-10-01 RX ORDER — PIPERACILLIN AND TAZOBACTAM 4; .5 G/20ML; G/20ML
3.38 INJECTION, POWDER, LYOPHILIZED, FOR SOLUTION INTRAVENOUS EVERY 6 HOURS
Refills: 0 | Status: DISCONTINUED | OUTPATIENT
Start: 2021-10-01 | End: 2021-10-02

## 2021-10-01 RX ORDER — MAGNESIUM OXIDE 400 MG ORAL TABLET 241.3 MG
400 TABLET ORAL ONCE
Refills: 0 | Status: COMPLETED | OUTPATIENT
Start: 2021-10-01 | End: 2021-10-01

## 2021-10-01 RX ORDER — MAGNESIUM SULFATE 500 MG/ML
1 VIAL (ML) INJECTION ONCE
Refills: 0 | Status: DISCONTINUED | OUTPATIENT
Start: 2021-10-01 | End: 2021-10-01

## 2021-10-01 RX ORDER — INSULIN GLARGINE 100 [IU]/ML
32 INJECTION, SOLUTION SUBCUTANEOUS AT BEDTIME
Refills: 0 | Status: DISCONTINUED | OUTPATIENT
Start: 2021-10-01 | End: 2021-10-02

## 2021-10-01 RX ADMIN — LISINOPRIL 20 MILLIGRAM(S): 2.5 TABLET ORAL at 06:06

## 2021-10-01 RX ADMIN — Medication 4: at 06:55

## 2021-10-01 RX ADMIN — Medication 8 UNIT(S): at 07:51

## 2021-10-01 RX ADMIN — Medication 500 MILLIGRAM(S): at 11:43

## 2021-10-01 RX ADMIN — INSULIN GLARGINE 32 UNIT(S): 100 INJECTION, SOLUTION SUBCUTANEOUS at 22:24

## 2021-10-01 RX ADMIN — Medication 8 UNIT(S): at 12:59

## 2021-10-01 RX ADMIN — PIPERACILLIN AND TAZOBACTAM 200 GRAM(S): 4; .5 INJECTION, POWDER, LYOPHILIZED, FOR SOLUTION INTRAVENOUS at 02:00

## 2021-10-01 RX ADMIN — ATORVASTATIN CALCIUM 40 MILLIGRAM(S): 80 TABLET, FILM COATED ORAL at 22:22

## 2021-10-01 RX ADMIN — CLOPIDOGREL BISULFATE 75 MILLIGRAM(S): 75 TABLET, FILM COATED ORAL at 06:06

## 2021-10-01 RX ADMIN — PIPERACILLIN AND TAZOBACTAM 200 GRAM(S): 4; .5 INJECTION, POWDER, LYOPHILIZED, FOR SOLUTION INTRAVENOUS at 10:08

## 2021-10-01 RX ADMIN — PIPERACILLIN AND TAZOBACTAM 200 GRAM(S): 4; .5 INJECTION, POWDER, LYOPHILIZED, FOR SOLUTION INTRAVENOUS at 18:52

## 2021-10-01 RX ADMIN — Medication 2: at 11:51

## 2021-10-01 RX ADMIN — Medication 1 TABLET(S): at 11:43

## 2021-10-01 RX ADMIN — Medication 8 UNIT(S): at 18:51

## 2021-10-01 RX ADMIN — Medication 2: at 22:23

## 2021-10-01 RX ADMIN — HEPARIN SODIUM 5000 UNIT(S): 5000 INJECTION INTRAVENOUS; SUBCUTANEOUS at 11:44

## 2021-10-01 RX ADMIN — HEPARIN SODIUM 5000 UNIT(S): 5000 INJECTION INTRAVENOUS; SUBCUTANEOUS at 18:52

## 2021-10-01 RX ADMIN — MAGNESIUM OXIDE 400 MG ORAL TABLET 400 MILLIGRAM(S): 241.3 TABLET ORAL at 11:44

## 2021-10-01 RX ADMIN — HEPARIN SODIUM 5000 UNIT(S): 5000 INJECTION INTRAVENOUS; SUBCUTANEOUS at 01:59

## 2021-10-01 RX ADMIN — Medication 166.67 MILLIGRAM(S): at 06:05

## 2021-10-01 RX ADMIN — Medication 81 MILLIGRAM(S): at 06:06

## 2021-10-01 NOTE — DISCHARGE NOTE PROVIDER - NSDCFUADDINST_GEN_ALL_CORE_FT
FOLLOW UP:  Dr. Cary in 1 week. Your appointment has been made for _______.   CALL OFFICE FOR CONCERNS:   [phone] with any questions.  Call the office if you experience increasing pain, redness, swelling or drainage from right foot wound or temperature >101.4F.  WOUND CARE:  Clean wound daily with saline. Daily wet to dry dressing changes -  apply saline moistened gauze to amputation site and wrap with kerlix.   You may shower, remove dressing first. Allow soap and water to run over incision sites. Do not scrub. Pat dry. Redress when done.   ACTIVITY:  Ambulate as tolerated with heel weight bearing on right foot until wound heals.   DIET:   Diabetic diet.    NEW MEDICATIONS:  Lantus 32 units at bedtime  Lispro 8 units before meals  ADDITIONAL FOLLOW UP AFTER DISCHARGE:  Endocrinology physicians - follow up 1 week after discharge to check on your diabetes control and new diabetic regimen  DISCHARGE DESTINATION:  Home   FOLLOW UP:  Dr. Cary in 1 week. Your appointment has been made for _______.   CALL OFFICE FOR CONCERNS:   [phone] with any questions.  Call the office if you experience increasing pain, redness, swelling or drainage from right foot wound or temperature >101.4F.  WOUND CARE:  Clean wound daily with saline. Daily wet to dry dressing changes -  apply saline moistened gauze to amputation site and wrap with kerlix.   You may shower, remove dressing first. Allow soap and water to run over incision sites. Do not scrub. Pat dry. Redress when done.   ACTIVITY:  Ambulate as tolerated with heel weight bearing on right foot until wound heals.   DIET:   Diabetic diet.    NEW MEDICATIONS:  Antibiotic ??  Lantus 32 units at bedtime  Lispro 8 units before meals  ADDITIONAL FOLLOW UP AFTER DISCHARGE:  Endocrinology physicians - follow up 1 week after discharge to check on your diabetes control and new diabetic regimen  DISCHARGE DESTINATION:  Home   FOLLOW UP:  Dr. Cary in 1 week. Your appointment has been made for 10/12 at 3:15 PM.   CALL OFFICE FOR CONCERNS:   [phone] with any questions.  Call the office if you experience increasing pain, redness, swelling or drainage from right foot wound or temperature >101.4F.  WOUND CARE:  Clean wound daily with saline. Daily wet to dry dressing changes -  apply saline moistened gauze to amputation site and wrap with kerlix.   You may shower, remove dressing first. Allow soap and water to run over incision sites. Do not scrub. Pat dry. Redress when done.   ACTIVITY:  Ambulate as tolerated with heel weight bearing on right foot until wound heals.   DIET:   Diabetic diet.    NEW MEDICATIONS:  Lantus 32 units at bedtime  Lispro 8 units before meals  ADDITIONAL FOLLOW UP AFTER DISCHARGE:  Endocrinology physicians - follow up 1 week after discharge to check on your diabetes control and new diabetic regimen. Please call  to make an appointment.  DISCHARGE DESTINATION:  Home   FOLLOW UP:  Dr. Cary in 1 week. Your appointment has been made for 10/12 at 3:15 PM.   CALL OFFICE FOR CONCERNS:   [phone] with any questions.  Call the office if you experience increasing pain, redness, swelling or drainage from right foot wound or temperature >101.4F.    WOUND CARE:  Clean wound daily with saline. Daily wet to dry dressing changes -  apply saline moistened gauze to amputation site and wrap with kerlix.   You may shower, remove dressing first. Allow soap and water to run over incision sites. Do not scrub. Pat dry. Redress when done.     ACTIVITY:  Ambulate as tolerated with heel weight bearing on right foot until wound heals.     DIET:   Diabetic diet.      NEW MEDICATIONS:  The nurse will instruct you on how to administer Lantus and Lispro if you do not know how. Only take Lantus and Lispro for your diabetes. Discontinue any oral medications that you used to take for your diabetes.    - Lantus 32 units at bedtime  - Lispro 8 units before meals  - A glucometer with supplies was sent to [facility]    You have also been prescribed antiobitoics  - 2 week course of Bactrim. 1 tablet two times a day  - 2 week course of amoxicillin. 1 tablet two times a day  - Please follow instructions on prescription bottle      ADDITIONAL FOLLOW UP AFTER DISCHARGE:  Endocrinology physicians - follow up 1 week after discharge to check on your diabetes control and new diabetic regimen. Please call  to make an appointment.    DISCHARGE DESTINATION:  Home

## 2021-10-01 NOTE — DISCHARGE NOTE PROVIDER - PROVIDER TOKENS
PROVIDER:[TOKEN:[[id number]]] PROVIDER:[TOKEN:[[id number]]],FREE:[LAST:[[facility]],PHONE:[phone]],FAX:[(   )    -],ADDRESS:[[facility]  [address] [place] [address]],FOLLOWUP:[1 week]]

## 2021-10-01 NOTE — PROGRESS NOTE ADULT - SUBJECTIVE AND OBJECTIVE BOX
Subjective/Interval events:  No acute overnight events. Pain in foot well controlled. Tolerating diet. No fever/chills. Seen by endo yesterday, insulin uptitrated. Patient says he has been on insulin before and is comfortable injecting himself.    MEDICATIONS  (STANDING):  ascorbic acid 500 milliGRAM(s) Oral daily  aspirin  chewable 81 milliGRAM(s) Oral every 24 hours  atorvastatin 40 milliGRAM(s) Oral at bedtime  clopidogrel Tablet 75 milliGRAM(s) Oral every 24 hours  dextrose 40% Gel 15 Gram(s) Oral once  dextrose 5%. 1000 milliLiter(s) (50 mL/Hr) IV Continuous <Continuous>  dextrose 5%. 1000 milliLiter(s) (100 mL/Hr) IV Continuous <Continuous>  dextrose 50% Injectable 25 Gram(s) IV Push once  dextrose 50% Injectable 12.5 Gram(s) IV Push once  dextrose 50% Injectable 25 Gram(s) IV Push once  glucagon  Injectable 1 milliGRAM(s) IntraMuscular once  heparin   Injectable 5000 Unit(s) SubCutaneous every 8 hours  influenza   Vaccine 0.5 milliLiter(s) IntraMuscular once  insulin glargine Injectable (LANTUS) 25 Unit(s) SubCutaneous at bedtime  insulin lispro (ADMELOG) corrective regimen sliding scale   SubCutaneous Before meals and at bedtime  insulin lispro Injectable (ADMELOG) 8 Unit(s) SubCutaneous three times a day before meals  lisinopril 20 milliGRAM(s) Oral daily  multivitamin 1 Tablet(s) Oral daily  piperacillin/tazobactam IVPB.. 3.375 Gram(s) IV Intermittent every 6 hours  vancomycin  IVPB 1250 milliGRAM(s) IV Intermittent every 8 hours    MEDICATIONS  (PRN):  acetaminophen   Tablet .. 650 milliGRAM(s) Oral every 6 hours PRN Temp greater or equal to 38C (100.4F), Mild Pain (1 - 3)      Vital Signs Last 24 Hrs  T(C): 36.2 (01 Oct 2021 09:11), Max: 36.6 (30 Sep 2021 22:25)  T(F): 97.1 (01 Oct 2021 09:11), Max: 97.8 (30 Sep 2021 22:25)  HR: 68 (01 Oct 2021 08:12) (63 - 88)  BP: 120/76 (01 Oct 2021 08:12) (110/67 - 131/80)  BP(mean): --  RR: 16 (01 Oct 2021 08:12) (16 - 18)  SpO2: 98% (01 Oct 2021 08:12) (97% - 98%)    PHYSICAL EXAM:  GENERAL: pleasant, appropriate, no acute distress. Participating appropriately in interview  HEENT - NC/AT, EOMI, PERLLA, oropharynx clear without exudate or erythema, moist mucus membranes  NECK: Soft, supple, No JVD, no lymphadenopathy  CHEST/LUNG: Clear to auscultation bilaterally; No rales, rhonchi, wheezing. Normal work of breathing, not tachypneic  HEART: Regular rate and rhythm; No murmurs, rubs, or gallops, normal s1/s2. Warm and well-perfused  ABDOMEN: obese, soft, Nontender, Nondistended. Normoactive bowel sounds.  EXTREMITIES:  2+ Peripheral Pulses, No clubbing, cyanosis, or edema  NEURO:  awake, alert, oriented to person, place, time, and situation. Cranial nerves intact, no motor or sensory deficits. Moves all extremities.  SKIN: No rashes or lesions. R foot wrapped in kerlix without saturation    LABS:  09-30    134<L>  |  99  |  15  ----------------------------<  266<H>  4.6   |  26  |  0.56    Ca    9.9      30 Sep 2021 09:30  Phos  2.8     09-30  Mg     1.7     09-30                          13.8   8.68  )-----------( 248      ( 30 Sep 2021 09:30 )             40.9       Culture - Other (collected 29 Sep 2021 14:58)  Source: .Other Right Third Toe  Gram Stain (29 Sep 2021 22:10):    No organisms seen    Rare WBC's  Preliminary Report (30 Sep 2021 16:27):    Rare Achromobacter xylosoxidans    Rare to few Streptococcus agalactiae (Group B)    Susceptibility to follow.    Culture in progress    Culture - Blood (collected 29 Sep 2021 01:52)  Source: .Blood Blood  Preliminary Report (01 Oct 2021 02:00):    No growth at 2 days.    Culture - Blood (collected 29 Sep 2021 01:52)  Source: .Blood Blood  Preliminary Report (01 Oct 2021 02:00):    No growth at 2 days.    Culture - Abscess with Gram Stain (collected 28 Sep 2021 21:46)  Source: .Abscess Leg - Left  Gram Stain (28 Sep 2021 22:40):    Few Gram Negative Rods    Few Gram positive cocci in pairs    Few WBC's  Preliminary Report (30 Sep 2021 15:45):    Moderate Achromobacter xylosoxidans Susceptibility to follow.    Few Staphylococcus epidermidis    Culture in progress        RADIOLOGY & ADDITIONAL TESTS:  reviewed, none new

## 2021-10-01 NOTE — DISCHARGE NOTE PROVIDER - NSDCCPCAREPLAN_GEN_ALL_CORE_FT
PRINCIPAL DISCHARGE DIAGNOSIS  Diagnosis: Type 2 diabetes mellitus with right diabetic foot infection  Assessment and Plan of Treatment:       SECONDARY DISCHARGE DIAGNOSES  Diagnosis: Cellulitis  Assessment and Plan of Treatment:     Diagnosis: Diabetes type 2, uncontrolled  Assessment and Plan of Treatment: A1C11.5    Diagnosis: Hypertension  Assessment and Plan of Treatment:     Diagnosis: Hyperlipidemia  Assessment and Plan of Treatment:     Diagnosis: Current smoker  Assessment and Plan of Treatment:     Diagnosis: CAD (coronary artery disease)  Assessment and Plan of Treatment:     Diagnosis: History of coronary angioplasty with insertion of stent  Assessment and Plan of Treatment:     Diagnosis: Obesity (BMI 30.0-34.9)  Assessment and Plan of Treatment:     Diagnosis: Toe gangrene  Assessment and Plan of Treatment: right 3rd toe

## 2021-10-01 NOTE — PROGRESS NOTE ADULT - PROBLEM SELECTOR PLAN 1
Please continue lantus   units at night.  Please continue lispro units before each meal.  Please continue lispro moderate dose sliding scale four times daily with meals and at bedtime    Pt's fingerstick glucose goal is 100-180 mg/dl    Will continue to monitor     Patient will be discharged on insulin. He know how to administer. Send insulin Rx to [facility], so we can ensure we send what is covered by insurance.    Pt can follow up at discharge with [facility] by calling  to make an appointment.   Will discuss case with Dr. Reyes   and update primary team Please increase lantus to 32  units at night.  Please continue lispro  8 units before each meal.  Please continue lispro moderate dose sliding scale four times daily with meals and at bedtime    Pt's fingerstick glucose goal is 100-180 mg/dl    Will continue to monitor     Patient will be discharged on insulin. He know how to administer. Send insulin Rx to [facility], so we can ensure we send what is covered by insurance.    Pt can follow up at discharge with [facility] by calling  to make an appointment.   Will discuss case with Dr. Reyes   and update primary team

## 2021-10-01 NOTE — DISCHARGE NOTE PROVIDER - CARE PROVIDERS DIRECT ADDRESSES
[email or web address]

## 2021-10-01 NOTE — PROGRESS NOTE ADULT - SUBJECTIVE AND OBJECTIVE BOX
INTERVAL HPI/OVERNIGHT EVENTS:    S/P right 3rd toe amputation on 9/29. Feeling well. Minimal pain. Ambulating. Good appetite.    FSG & insulin:  Yesterday:  Dinner    Lispro  8+0  Ate Fish, veg, and SF cheese cake.  Bedtime    Lantus 25     Today:  Breakfast    Lispro 8+4  Ate  oatmeal, plain yogurt, and banana.  Lunch FSG  161    Pt reports the following symptoms:    CONSTITUTIONAL:  Negative fever or chills, feels well, good appetite  EYES:  Negative  blurry vision or double vision  CARDIOVASCULAR:  Negative for chest pain or palpitations  RESPIRATORY:  Negative for cough, wheezing, or SOB   GASTROINTESTINAL:  Negative for nausea, vomiting, diarrhea, constipation, or abdominal pain  GENITOURINARY:  Negative frequency, urgency or dysuria  NEUROLOGIC:  No headache, confusion, dizziness, lightheadedness    MEDICATIONS  (STANDING):  ascorbic acid 500 milliGRAM(s) Oral daily  aspirin  chewable 81 milliGRAM(s) Oral every 24 hours  atorvastatin 40 milliGRAM(s) Oral at bedtime  clopidogrel Tablet 75 milliGRAM(s) Oral every 24 hours  dextrose 40% Gel 15 Gram(s) Oral once  dextrose 5%. 1000 milliLiter(s) (50 mL/Hr) IV Continuous <Continuous>  dextrose 5%. 1000 milliLiter(s) (100 mL/Hr) IV Continuous <Continuous>  dextrose 50% Injectable 25 Gram(s) IV Push once  dextrose 50% Injectable 12.5 Gram(s) IV Push once  dextrose 50% Injectable 25 Gram(s) IV Push once  glucagon  Injectable 1 milliGRAM(s) IntraMuscular once  heparin   Injectable 5000 Unit(s) SubCutaneous every 8 hours  influenza   Vaccine 0.5 milliLiter(s) IntraMuscular once  insulin glargine Injectable (LANTUS) 25 Unit(s) SubCutaneous at bedtime  insulin lispro (ADMELOG) corrective regimen sliding scale   SubCutaneous Before meals and at bedtime  insulin lispro Injectable (ADMELOG) 8 Unit(s) SubCutaneous three times a day before meals  lisinopril 20 milliGRAM(s) Oral daily  multivitamin 1 Tablet(s) Oral daily  piperacillin/tazobactam IVPB.. 3.375 Gram(s) IV Intermittent every 6 hours    MEDICATIONS  (PRN):  acetaminophen   Tablet .. 650 milliGRAM(s) Oral every 6 hours PRN Temp greater or equal to 38C (100.4F), Mild Pain (1 - 3)      PHYSICAL EXAM  Vital Signs Last 24 Hrs  T(C): 36.2 (01 Oct 2021 09:11), Max: 36.6 (30 Sep 2021 22:25)  T(F): 97.1 (01 Oct 2021 09:11), Max: 97.8 (30 Sep 2021 22:25)  HR: 68 (01 Oct 2021 08:12) (63 - 88)  BP: 120/76 (01 Oct 2021 08:12) (110/67 - 131/80)  BP(mean): --  RR: 16 (01 Oct 2021 08:12) (16 - 18)  SpO2: 98% (01 Oct 2021 08:12) (97% - 98%)    Constitutional: NAD.   HEENT: NCAT, MMM, OP clear, EOMI, no proptosis or lid retraction  Neck: no thyromegaly or palpable thyroid nodules   Respiratory: lungs CTAB.  Cardiovascular: regular rhythm, normal S1 and S2, no audible murmurs, no peripheral edema  GI: soft, NT/ND, no masses/HSM appreciated.  Neurology: no tremors, DTR 2+  Skin: no visible rashes/lesions. Right foot wrapped in kerlex.  Psychiatric: AAO x 3, normal affect/mood.    LABS:                        13.8   7.09  )-----------( 259      ( 01 Oct 2021 10:45 )             40.4     10-01    135  |  100  |  11  ----------------------------<  198<H>  see note   |  26  |  0.59    Ca    9.5      01 Oct 2021 10:46  Phos  3.5     10-01  Mg     1.9     10-01              HbA1C:   CAPILLARY BLOOD GLUCOSE      POCT Blood Glucose.: 161 mg/dL (01 Oct 2021 12:53)  POCT Blood Glucose.: 196 mg/dL (01 Oct 2021 11:41)  POCT Blood Glucose.: 206 mg/dL (01 Oct 2021 06:37)  POCT Blood Glucose.: 118 mg/dL (30 Sep 2021 21:51)  POCT Blood Glucose.: 146 mg/dL (30 Sep 2021 17:54)      Insulin Sliding Scale requirements X 24 Hours:    RADIOLOGY & ADDITIONAL TESTS:    A/P: 45y Male with history of DM type II presenting for       1.  DM -   A1C:  Weight:  Cr/GFR:  EF:    Please continue           units lantus at bedtime  / in the morning and        units lispro with meals and lispro moderate / low dose sliding scale 4 times daily with meals and at bedtime.  Please continue consistent carbohydrate diet.      Goal FSG is   Will continue to monitor   For discharge, pt can continue    Pt can follow up at discharge with [facility] by calling  to make an appointment.   Will discuss case with     and update primary team    To Make an appointment at [address] [place] [address] for the patient, either:  1. Send a STAT task via [employer] to Linnea Burleson or Emani Vázquez (office managers)   OR  2. Call supervisor's line. P: [phone] (do not give this number to patient). VM is checked periodically.  In the message, specify that this is a hospital discharge follow-up, and that the appt can be made with a NP if there is no timely MD availability.    REMINDERS FOR INSULIN/DIABETES SUPPLIES at DISCHARGE:  INSULIN:   Long actin/Basal Insulin: Examples: Toujeo, Basaglar, Tresiba, Lantus   Short acting/Bolus Insulin: Humalog, Admelog, Novolog  Please ensure that BOTH short acting and long acting insulin are prescribed in the same preparation (Ex: PEN vs VIAL/SOLUTION)     TESTING SUPPLIES:   All glucometer supplies should be written as generic to avoid issues with insurance. Use the free text option in sunrise prescription writer, and type in glucometer test strips, lancets, etc to order.    If sending patient home on insulin PEN, please send:   •	BD bernardo insulin pen needles for use up to 4 times daily (total quantity 100)  •	Lancets for use up to 4 times daily (total quantity 100)  •	Glucometer Test strips for use up to 4 times daily (total quantity 100)  •	Alcohol swabs for use up to 4 times daily (total quantity 100)  •	Glucometer (If provided by hospital, still provide scripts for lancets, test strips, and swabs)  If sending patient home on insulin VIAL, please send:   •	Insulin syringes (6mm) - for use up to 4 times daily (total quantity 100)  •	Lancets for use up to 4 times daily (total quantity 100)  •	Generic Glucometer Test strips for use up to 4 times daily (total quantity 100)  •	Alcohol swabs for use up to 4 times daily (total quantity 100)  •	Generic Glucometer (If provided by hospital, still provide scripts for lancets, test strips, and swabs)  •	Do not specify brand for testing supplies (such as contour, freestyle, one touch etc) that way the pharmacy has the freedom to pick and change according to what the insurance dictates.  For patients without insurance:   •	Provide social work with appropriate scripts so they may obtain 1 week of samples  •	Provide with glucometer. Glucometers are located at various nursing stations, the nursing office, education, and endocrine fellows office.  •	Please make appointment with Amanda Yao NP or Edel Ortiz RN and JUAN Burris at the [address] [place] [address] endocrinology clinic. They can see patients without insurance, provide appropriate samples, and assist in getting insurance coverage.     PREFERRED PHARMACY:  [facility] (located on 1st floor next to admitting)  P: [phone]  Hours: M – F 8AM – 8PM, Sat 8AM – 4PM, Sun—closed  If not using [facility], please follow up with chosen pharmacy to ensure insulin prescribed is covered.  INTERVAL HPI/OVERNIGHT EVENTS:    S/P right 3rd toe amputation on 9/29. Feeling well. Minimal pain. Ambulating. Good appetite.    FSG & insulin:  Yesterday:  Dinner    Lispro  8+0  Ate Fish, veg, and SF cheese cake.  Bedtime    Lantus 25     Today:  Breakfast    Lispro 8+4  Ate  oatmeal, plain yogurt, and banana.  Lunch FSG  161    Pt reports the following symptoms:    CONSTITUTIONAL:  Negative fever or chills, feels well, good appetite  EYES:  Negative  blurry vision or double vision  CARDIOVASCULAR:  Negative for chest pain or palpitations  RESPIRATORY:  Negative for cough, wheezing, or SOB   GASTROINTESTINAL:  Negative for nausea, vomiting, diarrhea, constipation, or abdominal pain  GENITOURINARY:  Negative frequency, urgency or dysuria  NEUROLOGIC:  No headache, confusion, dizziness, lightheadedness    MEDICATIONS  (STANDING):  ascorbic acid 500 milliGRAM(s) Oral daily  aspirin  chewable 81 milliGRAM(s) Oral every 24 hours  atorvastatin 40 milliGRAM(s) Oral at bedtime  clopidogrel Tablet 75 milliGRAM(s) Oral every 24 hours  dextrose 40% Gel 15 Gram(s) Oral once  dextrose 5%. 1000 milliLiter(s) (50 mL/Hr) IV Continuous <Continuous>  dextrose 5%. 1000 milliLiter(s) (100 mL/Hr) IV Continuous <Continuous>  dextrose 50% Injectable 25 Gram(s) IV Push once  dextrose 50% Injectable 12.5 Gram(s) IV Push once  dextrose 50% Injectable 25 Gram(s) IV Push once  glucagon  Injectable 1 milliGRAM(s) IntraMuscular once  heparin   Injectable 5000 Unit(s) SubCutaneous every 8 hours  influenza   Vaccine 0.5 milliLiter(s) IntraMuscular once  insulin glargine Injectable (LANTUS) 25 Unit(s) SubCutaneous at bedtime  insulin lispro (ADMELOG) corrective regimen sliding scale   SubCutaneous Before meals and at bedtime  insulin lispro Injectable (ADMELOG) 8 Unit(s) SubCutaneous three times a day before meals  lisinopril 20 milliGRAM(s) Oral daily  multivitamin 1 Tablet(s) Oral daily  piperacillin/tazobactam IVPB.. 3.375 Gram(s) IV Intermittent every 6 hours    MEDICATIONS  (PRN):  acetaminophen   Tablet .. 650 milliGRAM(s) Oral every 6 hours PRN Temp greater or equal to 38C (100.4F), Mild Pain (1 - 3)      PHYSICAL EXAM  Vital Signs Last 24 Hrs  T(C): 36.2 (01 Oct 2021 09:11), Max: 36.6 (30 Sep 2021 22:25)  T(F): 97.1 (01 Oct 2021 09:11), Max: 97.8 (30 Sep 2021 22:25)  HR: 68 (01 Oct 2021 08:12) (63 - 88)  BP: 120/76 (01 Oct 2021 08:12) (110/67 - 131/80)  BP(mean): --  RR: 16 (01 Oct 2021 08:12) (16 - 18)  SpO2: 98% (01 Oct 2021 08:12) (97% - 98%)    Constitutional: NAD.   HEENT: NCAT, MMM, OP clear, EOMI, no proptosis or lid retraction  Neck: no thyromegaly or palpable thyroid nodules   Respiratory: lungs CTAB.  Cardiovascular: regular rhythm, normal S1 and S2, no audible murmurs, no peripheral edema  GI: soft, NT/ND, no masses/HSM appreciated.  Neurology: no tremors, DTR 2+  Skin: no visible rashes/lesions. Right foot wrapped in kerlex.  Psychiatric: AAO x 3, normal affect/mood.    LABS:                        13.8   7.09  )-----------( 259      ( 01 Oct 2021 10:45 )             40.4     10-01    135  |  100  |  11  ----------------------------<  198<H>  see note   |  26  |  0.59    Ca    9.5      01 Oct 2021 10:46  Phos  3.5     10-01  Mg     1.9     10-01              HbA1C:   CAPILLARY BLOOD GLUCOSE      POCT Blood Glucose.: 161 mg/dL (01 Oct 2021 12:53)  POCT Blood Glucose.: 196 mg/dL (01 Oct 2021 11:41)  POCT Blood Glucose.: 206 mg/dL (01 Oct 2021 06:37)  POCT Blood Glucose.: 118 mg/dL (30 Sep 2021 21:51)  POCT Blood Glucose.: 146 mg/dL (30 Sep 2021 17:54)

## 2021-10-01 NOTE — DISCHARGE NOTE PROVIDER - HOSPITAL COURSE
Pt is a 46 y/o M with PMHx HTN, HLD, DM with neuropathy, CAD s/p stents who presents to the ED to be admitted and pre-op for right 3rd toe gangrene. Pt states that about 3 weeks ago he accidentally stepped on a nail.  He saw a podiatrist about 10 days ago after having subjective fever and chills, and he was started on Augmentin.  He saw the podiatrist recently for his second appointment at which time the toe turned black and so he was referred to see Dr. Cary for further evaluation.  Pt was seen at Dr. Cary's office today and was referred to the ED to be admitted and pre-op for right 3rd toe amputation. He denies fever, chills, chest pain, SOB, dizziness.   Admitted 9/28/21 via ER with diabetic right foot infection with gangrene of 3rd toe. Wound to foot caused by trauma when he stepped on nail. He has failed outpatient Abx and wound deteriorated being followed by podiatry.  Started on vanco and zosyn. Taken to OR 9/29/21. He underwent right 3rd to amputation. He tolerated procedure well. Post op amp site appeared healthy.  Diabetes uncontrolled on oral meds home regimen. Endo consulted. Started on Lantus and lispro premeal. Pt is a 44 y/o M with PMHx HTN, HLD, DM with neuropathy, CAD s/p stents who presents to the ED to be admitted and pre-op for right 3rd toe gangrene. Pt states that about 3 weeks ago he accidentally stepped on a nail.  He saw a podiatrist about 10 days ago after having subjective fever and chills, and he was started on Augmentin.  He saw the podiatrist recently for his second appointment at which time the toe turned black and so he was referred to see Dr. Cary for further evaluation.  Pt was seen at Dr. Cary's office today and was referred to the ED to be admitted and pre-op for right 3rd toe amputation. He denies fever, chills, chest pain, SOB, dizziness.   Admitted 9/28/21 via ER with diabetic right foot infection with gangrene of 3rd toe. Wound to foot caused by trauma when he stepped on nail. He has failed outpatient Abx and wound deteriorated being followed by podiatry.  Started on vanco and zosyn. Taken to OR 9/29/21. He underwent right 3rd to amputation. He tolerated procedure well. Post op amp site appeared healthy.  Diabetes uncontrolled on oral meds home regimen. Endo consulted. Started on Lantus and lispro premeal.   Final Abx on d/c..................... Pt is a 44 y/o M with PMHx HTN, HLD, DM with neuropathy, CAD s/p stents who presents to the ED to be admitted and pre-op for right 3rd toe gangrene. Pt states that about 3 weeks ago he accidentally stepped on a nail.  He saw a podiatrist about 10 days ago after having subjective fever and chills, and he was started on Augmentin.  He saw the podiatrist recently for his second appointment at which time the toe turned black and so he was referred to see Dr. Cary for further evaluation.  Pt was seen at Dr. Cary's office today and was referred to the ED to be admitted and pre-op for right 3rd toe amputation. He denies fever, chills, chest pain, SOB, dizziness.   Admitted 9/28/21 via ER with diabetic right foot infection with gangrene of 3rd toe. Wound to foot caused by trauma when he stepped on nail. He has failed outpatient Abx and wound deteriorated being followed by podiatry.  Started on vanco and zosyn. Taken to OR 9/29/21. He underwent right 3rd to amputation. He tolerated procedure well. Post op amp site appeared healthy.  Diabetes uncontrolled on oral meds home regimen. Endo consulted. Started on Lantus and lispro premeal. Pt is a 44 y/o M with PMHx HTN, HLD, DM with neuropathy, CAD s/p stents who presents to the ED to be admitted and pre-op for right 3rd toe gangrene. Pt states that about 3 weeks ago he accidentally stepped on a nail.  He saw a podiatrist about 10 days ago after having subjective fever and chills, and he was started on Augmentin.  He saw the podiatrist recently for his second appointment at which time the toe turned black and so he was referred to see Dr. Cary for further evaluation.  Pt was seen at Dr. Cary's office today and was referred to the ED to be admitted and pre-op for right 3rd toe amputation. He denies fever, chills, chest pain, SOB, dizziness.   Admitted 9/28/21 via ER with diabetic right foot infection with gangrene of 3rd toe. Wound to foot caused by trauma when he stepped on nail. He has failed outpatient Abx and wound deteriorated being followed by podiatry.  Started on vanco and zosyn. Taken to OR 9/29/21. He underwent right 3rd to amputation. He tolerated procedure well. Post op amp site appeared healthy.  Diabetes uncontrolled on oral meds home regimen. Endo consulted. Started on Lantus and lispro premeal. Patient was also discharged on 2 week course of antibiotics.

## 2021-10-01 NOTE — DISCHARGE NOTE PROVIDER - NSDCMRMEDTOKEN_GEN_ALL_CORE_FT
aspirin 81 mg oral delayed release tablet: 1 tab(s) orally once a day  atorvastatin 40 mg oral tablet: 1 tab(s) orally once a day  clopidogrel 75 mg oral tablet: 1 tab(s) orally once a day  glipiZIDE 10 mg oral tablet: 1 tab(s) orally once a day  lisinopril 20 mg oral tablet: 1 tab(s) orally once a day  metFORMIN 1000 mg oral tablet: 1 tab(s) orally 2 times a day   aspirin 81 mg oral delayed release tablet: 1 tab(s) orally once a day  atorvastatin 40 mg oral tablet: 1 tab(s) orally once a day  clopidogrel 75 mg oral tablet: 1 tab(s) orally once a day  lisinopril 20 mg oral tablet: 1 tab(s) orally once a day   alcohol swabs : Apply topically to affected area 4 times a day   amoxicillin 875 mg oral tablet: 1 tab(s) orally 2 times a day   ascorbic acid 500 mg oral tablet: 1 tab(s) orally once a day  aspirin 81 mg oral delayed release tablet: 1 tab(s) orally once a day  atorvastatin 40 mg oral tablet: 1 tab(s) orally once a day  Bactrim  mg-160 mg oral tablet: 1 tab(s) orally 2 times a day   clopidogrel 75 mg oral tablet: 1 tab(s) orally once a day  glucometer (per patient&#x27;s insurance): Test blood sugars four times a day. Dispense #1 glucometer.  HumaLOG 100 units/mL injectable solution: 8 unit(s) subcutaneous, 3 times a day (before meals)   Insulin Pen Needles, 4mm: 1 application subcutaneously 4 times a day. ** Use with insulin pen **   lancets: 1 application subcutaneously 4 times a day   Lantus 100 units/mL subcutaneous solution: 38 unit(s) subcutaneous once a day (at bedtime)   lisinopril 20 mg oral tablet: 1 tab(s) orally once a day  Multiple Vitamins oral tablet: 1 tab(s) orally once a day  test strips (per patient&#x27;s insurance): 1 application subcutaneously 4 times a day. ** Compatible with patient&#x27;s glucometer **

## 2021-10-01 NOTE — DISCHARGE NOTE PROVIDER - CARE PROVIDER_API CALL
Thomas Cary)  Vascular Surgery  [address]  [place], [place] [address]  Phone: [phone]  Fax: [phone]  Follow Up Time:    Thomas Cary)  Vascular Surgery  [address] [place] [address]  Phone: [phone]  Fax: [phone]  Follow Up Time:     [facility],   [facility]  [address] [address]  Phone: [phone]  Fax: (   )    -  Follow Up Time: 1 week

## 2021-10-01 NOTE — DISCHARGE NOTE PROVIDER - NSDCFUADDAPPT_GEN_ALL_CORE_FT
Please follow up with [facility] at  within 1-2 weeks of your discharge in regards to your diabetes management.

## 2021-10-01 NOTE — PROGRESS NOTE ADULT - SUBJECTIVE AND OBJECTIVE BOX
O/N: JACINTO FERNANDEZ                                    --------------------------------------------------------------------------  PLEASE CHECK WHEN PRESENT:     [  ]Heart Failure     [  ] Acute     [  ] Acute on Chronic     [  ] Chronic  -------------------------------------------------------------------     [  ]Diastolic [HFpEF]     [  ]Systolic [HFrEF]     [  ]Combined [HFpEF & HFrEF]  .................................................................................     [  ]Other:     [ ] Pulmonary Hypertension     [ ] Afib     [x ] Hypertensive Heart Disease  -------------------------------------------------------------------  [ ] Respiratory failure  [ ] Acute cor pulmonale  [ ] Asthma/COPD Exacerbation  [ ] Pleural effusion  [ ] Aspiration pneumonia  [ ] Obstructive Sleep Apnea  -------------------------------------------------------------------  [  ]ROSANNE     [  ]ATN     [  ]Reneal Medullary Necrosis     [  ]Renal Cortical Necrosis     [  ]Other Pathological Lesions:    [  ]CKD 1  [  ]CKD 2  [  ]CKD 3  [  ]CKD 4  [  ]CKD 5  [  ]Other  -------------------------------------------------------------------  [ x ]Diabetes  [  ] Diabetic PVD Ulcer  [ x ] Neuropathic ulcer to DM  [  ] Diabetes with Nephropathy  [  ] Osteomyelitis due to diabetes  [  ] Hyperglycemia   [  ]hypoglycemia   --------------------------------------------------------------------  [  ]Malnutrition: See Nutrition Note  [  ]Cachexia  [  ]Other:   [  ]Supplement Ordered:  [  ]Morbid Obesity (BMI >=40]  ---------------------------------------------------------------------  [ ] Sepsis/severe sepsis/septic shock  [ ] Noninfectious SIRS  [ ] UTI  [ ] Pneumonia  -----------------------------------------------------------------------  [ ] Acidosis/alkalosis  [ ] Fluid overload  [ ] Hypokalemia  [ ] Hyperkalemia  [ ] Hypomagnesemia  [ ] Hypophosphatemia  [ ] Hyperphosphatemia  ------------------------------------------------------------------------  [ ] Acute blood loss anemia  [ ] Post op blood loss anemia  [ ] Iron deficiency anemia  [ ] Anemia due to chronic disease  [ ] Hypercoagulable state  [ ] Thrombocytopenia  ----------------------------------------------------------------------  [ ] Cerebral infarction  [ ] Transient ischemia attack  [ ] Encephalopathy - Toxic or Metabolic      A/P: 45y YO Male w/ Hx of HTN, HLD, DM, CAD who presents from Dr. Cary's office with right 3rd toe gangrene after stepping on a nail 3 weeks ago at , now s/p right 3rd toe amputation        Vascular/PAD:  -s/p right 3rd toe amputation 9/29  -Doppler tones noted in right foot   -Continue ASA+ plavix   -Will discuss plans for angiogram with attending pending wound healing progression     HLD:  -c/w Atorvastatin 40    HTN:   - Holding home Lisinopril 20    CAD/CHF:   -Hx of CAD s/p stents at OSH   -Continue ASA  + Plavix and statin     DM:  -HgA1c 11  -ISS   -Start Insulin as needed   -Endocrine consult     ID:  -Rt foot 3rd toe gangrene s/p amputation   -Continue Vanc+ zosyn for now   -f/u wound cx   -f/u blood cx     DVT PPx: SQH            O/N: JIM, VSS    SUBJECTIVE: Patient seen and examined bedside by chief resident. No acute complaints      Medications:   aspirin  chewable 81 milliGRAM(s) Oral every 24 hours  clopidogrel Tablet 75 milliGRAM(s) Oral every 24 hours  heparin   Injectable 5000 Unit(s) SubCutaneous every 8 hours  lisinopril 20 milliGRAM(s) Oral daily  piperacillin/tazobactam IVPB.. 3.375 Gram(s) IV Intermittent every 6 hours      Vital Signs Last 24 Hrs  T(C): 36.6 (01 Oct 2021 13:19), Max: 36.6 (30 Sep 2021 22:25)  T(F): 97.8 (01 Oct 2021 13:19), Max: 97.8 (30 Sep 2021 22:25)  HR: 70 (01 Oct 2021 13:04) (63 - 88)  BP: 122/77 (01 Oct 2021 13:04) (110/67 - 123/76)  BP(mean): --  RR: 16 (01 Oct 2021 13:04) (16 - 18)  SpO2: 97% (01 Oct 2021 13:04) (97% - 98%)  I&O's Detail    30 Sep 2021 07:01  -  01 Oct 2021 07:00  --------------------------------------------------------  IN:    IV PiggyBack: 100 mL    IV PiggyBack: 500 mL    Oral Fluid: 540 mL  Total IN: 1140 mL    OUT:  Total OUT: 0 mL    Total NET: 1140 mL      01 Oct 2021 07:01  -  01 Oct 2021 18:40  --------------------------------------------------------  IN:    Oral Fluid: 420 mL  Total IN: 420 mL    OUT:  Total OUT: 0 mL    Total NET: 420 mL          Physical Exam:  General: NAD  Pulmonary: nonlabored breathing, no respiratory distress   Cardiovascular: RRR   Abdominal: soft, NT/ND   Extremities: Right 3rd toe amputation site with pink tissue, no drainage, no purulent discharge. Calf soft and non tendern.   Right DP/PT Biphasic doppler tones         LABS:                        13.8   7.09  )-----------( 259      ( 01 Oct 2021 10:45 )             40.4     10-01    135  |  100  |  11  ----------------------------<  198<H>  see note   |  26  |  0.59    Ca    9.5      01 Oct 2021 10:46  Phos  3.5     10-01  Mg     1.9     10-01            RADIOLOGY & ADDITIONAL STUDIES:                      --------------------------------------------------------------------------  PLEASE CHECK WHEN PRESENT:     [  ]Heart Failure     [  ] Acute     [  ] Acute on Chronic     [  ] Chronic  -------------------------------------------------------------------     [  ]Diastolic [HFpEF]     [  ]Systolic [HFrEF]     [  ]Combined [HFpEF & HFrEF]  .................................................................................     [  ]Other:     [ ] Pulmonary Hypertension     [ ] Afib     [x ] Hypertensive Heart Disease  -------------------------------------------------------------------  [ ] Respiratory failure  [ ] Acute cor pulmonale  [ ] Asthma/COPD Exacerbation  [ ] Pleural effusion  [ ] Aspiration pneumonia  [ ] Obstructive Sleep Apnea  -------------------------------------------------------------------  [  ]ROSANNE     [  ]ATN     [  ]Reneal Medullary Necrosis     [  ]Renal Cortical Necrosis     [  ]Other Pathological Lesions:    [  ]CKD 1  [  ]CKD 2  [  ]CKD 3  [  ]CKD 4  [  ]CKD 5  [  ]Other  -------------------------------------------------------------------  [ x ]Diabetes  [  ] Diabetic PVD Ulcer  [ x ] Neuropathic ulcer to DM  [  ] Diabetes with Nephropathy  [  ] Osteomyelitis due to diabetes  [  ] Hyperglycemia   [  ]hypoglycemia   --------------------------------------------------------------------  [  ]Malnutrition: See Nutrition Note  [  ]Cachexia  [  ]Other:   [  ]Supplement Ordered:  [  ]Morbid Obesity (BMI >=40]  ---------------------------------------------------------------------  [ ] Sepsis/severe sepsis/septic shock  [ ] Noninfectious SIRS  [ ] UTI  [ ] Pneumonia  -----------------------------------------------------------------------  [ ] Acidosis/alkalosis  [ ] Fluid overload  [ ] Hypokalemia  [ ] Hyperkalemia  [ ] Hypomagnesemia  [ ] Hypophosphatemia  [ ] Hyperphosphatemia  ------------------------------------------------------------------------  [ ] Acute blood loss anemia  [ ] Post op blood loss anemia  [ ] Iron deficiency anemia  [ ] Anemia due to chronic disease  [ ] Hypercoagulable state  [ ] Thrombocytopenia  ----------------------------------------------------------------------  [ ] Cerebral infarction  [ ] Transient ischemia attack  [ ] Encephalopathy - Toxic or Metabolic      A/P: 45y YO Male w/ Hx of HTN, HLD, DM, CAD who presents from Dr. Cary's office with right 3rd toe gangrene after stepping on a nail 3 weeks ago at , now s/p right 3rd toe amputation        Vascular/PAD:  -s/p right 3rd toe amputation 9/29  - daily dressing changes  -Doppler tones noted in right foot   -Continue ASA+ plavix     HLD:  -c/w Atorvastatin 40    HTN:   - c/w lisinopril      CAD/CHF:   -Hx of CAD s/p stents at OSH   -Continue ASA  + Plavix and statin     DM:  -HgA1c 11  -ISS   -Start Insulin as needed   -Endocrine consult     ID:  -Rt foot 3rd toe gangrene s/p amputation   -Continue Vanc+ zosyn for now   -f/u wound cx   -f/u blood cx     DVT PPx: SQH

## 2021-10-02 ENCOUNTER — TRANSCRIPTION ENCOUNTER (OUTPATIENT)
Age: 46
End: 2021-10-02

## 2021-10-02 VITALS — TEMPERATURE: 98 F

## 2021-10-02 LAB
-  AMIKACIN: SIGNIFICANT CHANGE UP
-  AMPICILLIN: SIGNIFICANT CHANGE UP
-  AZTREONAM: SIGNIFICANT CHANGE UP
-  CEFEPIME: SIGNIFICANT CHANGE UP
-  CEFTRIAXONE: SIGNIFICANT CHANGE UP
-  CIPROFLOXACIN: SIGNIFICANT CHANGE UP
-  CLINDAMYCIN: SIGNIFICANT CHANGE UP
-  ERYTHROMYCIN: SIGNIFICANT CHANGE UP
-  GENTAMICIN: SIGNIFICANT CHANGE UP
-  LEVOFLOXACIN: SIGNIFICANT CHANGE UP
-  LEVOFLOXACIN: SIGNIFICANT CHANGE UP
-  MEROPENEM: SIGNIFICANT CHANGE UP
-  PENICILLIN: SIGNIFICANT CHANGE UP
-  PIPERACILLIN/TAZOBACTAM: SIGNIFICANT CHANGE UP
-  TOBRAMYCIN: SIGNIFICANT CHANGE UP
-  TRIMETHOPRIM/SULFAMETHOXAZOLE: SIGNIFICANT CHANGE UP
-  VANCOMYCIN: SIGNIFICANT CHANGE UP
CULTURE RESULTS: SIGNIFICANT CHANGE UP
CULTURE RESULTS: SIGNIFICANT CHANGE UP
GLUCOSE BLDC GLUCOMTR-MCNC: 189 MG/DL — HIGH (ref 70–99)
GLUCOSE BLDC GLUCOMTR-MCNC: 228 MG/DL — HIGH (ref 70–99)
GLUCOSE BLDC GLUCOMTR-MCNC: 251 MG/DL — HIGH (ref 70–99)
METHOD TYPE: SIGNIFICANT CHANGE UP
ORGANISM # SPEC MICROSCOPIC CNT: SIGNIFICANT CHANGE UP
SPECIMEN SOURCE: SIGNIFICANT CHANGE UP

## 2021-10-02 PROCEDURE — 71045 X-RAY EXAM CHEST 1 VIEW: CPT

## 2021-10-02 PROCEDURE — 87184 SC STD DISK METHOD PER PLATE: CPT

## 2021-10-02 PROCEDURE — 90715 TDAP VACCINE 7 YRS/> IM: CPT

## 2021-10-02 PROCEDURE — 87040 BLOOD CULTURE FOR BACTERIA: CPT

## 2021-10-02 PROCEDURE — 99233 SBSQ HOSP IP/OBS HIGH 50: CPT

## 2021-10-02 PROCEDURE — 80202 ASSAY OF VANCOMYCIN: CPT

## 2021-10-02 PROCEDURE — 86769 SARS-COV-2 COVID-19 ANTIBODY: CPT

## 2021-10-02 PROCEDURE — 87635 SARS-COV-2 COVID-19 AMP PRB: CPT

## 2021-10-02 PROCEDURE — 99285 EMERGENCY DEPT VISIT HI MDM: CPT

## 2021-10-02 PROCEDURE — 87186 SC STD MICRODIL/AGAR DIL: CPT

## 2021-10-02 PROCEDURE — 85730 THROMBOPLASTIN TIME PARTIAL: CPT

## 2021-10-02 PROCEDURE — 84100 ASSAY OF PHOSPHORUS: CPT

## 2021-10-02 PROCEDURE — 87070 CULTURE OTHR SPECIMN AEROBIC: CPT

## 2021-10-02 PROCEDURE — 86900 BLOOD TYPING SEROLOGIC ABO: CPT

## 2021-10-02 PROCEDURE — 93005 ELECTROCARDIOGRAM TRACING: CPT

## 2021-10-02 PROCEDURE — 83605 ASSAY OF LACTIC ACID: CPT

## 2021-10-02 PROCEDURE — 81003 URINALYSIS AUTO W/O SCOPE: CPT

## 2021-10-02 PROCEDURE — 87205 SMEAR GRAM STAIN: CPT

## 2021-10-02 PROCEDURE — 80048 BASIC METABOLIC PNL TOTAL CA: CPT

## 2021-10-02 PROCEDURE — 83036 HEMOGLOBIN GLYCOSYLATED A1C: CPT

## 2021-10-02 PROCEDURE — 85027 COMPLETE CBC AUTOMATED: CPT

## 2021-10-02 PROCEDURE — 87181 SC STD AGAR DILUTION PER AGT: CPT

## 2021-10-02 PROCEDURE — 85025 COMPLETE CBC W/AUTO DIFF WBC: CPT

## 2021-10-02 PROCEDURE — 83735 ASSAY OF MAGNESIUM: CPT

## 2021-10-02 PROCEDURE — 86850 RBC ANTIBODY SCREEN: CPT

## 2021-10-02 PROCEDURE — 85610 PROTHROMBIN TIME: CPT

## 2021-10-02 PROCEDURE — 36415 COLL VENOUS BLD VENIPUNCTURE: CPT

## 2021-10-02 PROCEDURE — 82962 GLUCOSE BLOOD TEST: CPT

## 2021-10-02 PROCEDURE — 80053 COMPREHEN METABOLIC PANEL: CPT

## 2021-10-02 PROCEDURE — 88305 TISSUE EXAM BY PATHOLOGIST: CPT

## 2021-10-02 PROCEDURE — 86901 BLOOD TYPING SEROLOGIC RH(D): CPT

## 2021-10-02 RX ORDER — ISOPROPYL ALCOHOL, BENZOCAINE .7; .06 ML/ML; ML/ML
1 SWAB TOPICAL
Qty: 100 | Refills: 1
Start: 2021-10-02 | End: 2021-11-20

## 2021-10-02 RX ORDER — AMOXICILLIN 250 MG/5ML
1 SUSPENSION, RECONSTITUTED, ORAL (ML) ORAL
Qty: 28 | Refills: 0
Start: 2021-10-02 | End: 2021-10-15

## 2021-10-02 RX ORDER — INSULIN LISPRO 100/ML
8 VIAL (ML) SUBCUTANEOUS
Qty: 7.2 | Refills: 0
Start: 2021-10-02 | End: 2021-10-31

## 2021-10-02 RX ORDER — METFORMIN HYDROCHLORIDE 850 MG/1
1 TABLET ORAL
Qty: 0 | Refills: 0 | DISCHARGE

## 2021-10-02 RX ORDER — INSULIN LISPRO 100/ML
8 VIAL (ML) SUBCUTANEOUS
Qty: 0 | Refills: 0 | DISCHARGE
Start: 2021-10-02 | End: 2021-10-31

## 2021-10-02 RX ORDER — INSULIN GLARGINE 100 [IU]/ML
38 INJECTION, SOLUTION SUBCUTANEOUS
Qty: 11.4 | Refills: 0
Start: 2021-10-02 | End: 2021-10-31

## 2021-10-02 RX ORDER — AZTREONAM 2 G
1 VIAL (EA) INJECTION
Qty: 28 | Refills: 0
Start: 2021-10-02 | End: 2021-10-15

## 2021-10-02 RX ORDER — INSULIN GLARGINE 100 [IU]/ML
44 INJECTION, SOLUTION SUBCUTANEOUS
Qty: 0 | Refills: 0 | DISCHARGE
Start: 2021-10-02 | End: 2021-10-31

## 2021-10-02 RX ORDER — ASCORBIC ACID 60 MG
1 TABLET,CHEWABLE ORAL
Qty: 30 | Refills: 0
Start: 2021-10-02 | End: 2021-10-31

## 2021-10-02 RX ADMIN — Medication 2: at 13:28

## 2021-10-02 RX ADMIN — Medication 81 MILLIGRAM(S): at 07:06

## 2021-10-02 RX ADMIN — PIPERACILLIN AND TAZOBACTAM 200 GRAM(S): 4; .5 INJECTION, POWDER, LYOPHILIZED, FOR SOLUTION INTRAVENOUS at 11:49

## 2021-10-02 RX ADMIN — CLOPIDOGREL BISULFATE 75 MILLIGRAM(S): 75 TABLET, FILM COATED ORAL at 07:06

## 2021-10-02 RX ADMIN — PIPERACILLIN AND TAZOBACTAM 200 GRAM(S): 4; .5 INJECTION, POWDER, LYOPHILIZED, FOR SOLUTION INTRAVENOUS at 00:18

## 2021-10-02 RX ADMIN — Medication 1 TABLET(S): at 11:49

## 2021-10-02 RX ADMIN — Medication 8 UNIT(S): at 13:28

## 2021-10-02 RX ADMIN — Medication 8 UNIT(S): at 08:55

## 2021-10-02 RX ADMIN — PIPERACILLIN AND TAZOBACTAM 200 GRAM(S): 4; .5 INJECTION, POWDER, LYOPHILIZED, FOR SOLUTION INTRAVENOUS at 07:06

## 2021-10-02 RX ADMIN — Medication 500 MILLIGRAM(S): at 11:49

## 2021-10-02 RX ADMIN — LISINOPRIL 20 MILLIGRAM(S): 2.5 TABLET ORAL at 07:06

## 2021-10-02 RX ADMIN — HEPARIN SODIUM 5000 UNIT(S): 5000 INJECTION INTRAVENOUS; SUBCUTANEOUS at 09:56

## 2021-10-02 RX ADMIN — Medication 6: at 07:18

## 2021-10-02 RX ADMIN — HEPARIN SODIUM 5000 UNIT(S): 5000 INJECTION INTRAVENOUS; SUBCUTANEOUS at 02:36

## 2021-10-02 NOTE — PROGRESS NOTE ADULT - ASSESSMENT
45YOM with history of obesity (BMI 30.6), type 2 diabetes (a1c this admission 11.5%, not on insulin), essential HTN, CAD s/p PCI, HLD admitted to vascular surgery service for gangrene of R 3rd toe after stepping on nail at work s/p amputation 9/29.     R 3rd toe gangrene  Diabetic foot infection  Occurred after stepping on nail at work. s/p amputation of R 3rd toe 9/29  -received tetanus 9/28  -f/u OR cultures - growing Achromobacter, Staph epi, GBS  -can d/c with bactrim/amoxicillin  -BCx negative    Type 2 diabetes  Hgb a1c 11.5% this admission. Has endocrinologist but she moved and has been mostly communicating via phone - he would like to switch endocrinologist.  -home glipizide and metformin on hold  -endocrine consulted, discharge with new insulin Lantus 32U qhs, lispro 8U tidwm. Defer to endocrine for further titration as outpatient  -rx for glucometer and DM supplies sent. He knows how to inject himself.    Essential hypertension  Takes lisinopril 20mg once daily at home. BP at goal  -continue home meds    CAD s/p PCI - continue home ASA/statin/plavix  Hyperlipidemia - continue home statin  Obesity - BMI is 30.6, affects all aspects of care     Dispo: home today 
45YOM with history of obesity (BMI 30.6), type 2 diabetes (a1c this admission 11.5%, not on insulin), essential HTN, CAD s/p PCI, HLD admitted to vascular surgery service for gangrene of R 3rd toe after stepping on nail at work s/p amputation 9/29.     R 3rd toe gangrene  Diabetic foot infection  Occurred after stepping on nail at work. s/p amputation of R 3rd toe 9/29  -received tetanus 9/28  -on vanco/zosyn for now  -f/u OR cultures - growing Achromobacter, Staph epi, GBS  -BCx negative    Type 2 diabetes  Hgb a1c 11.5% this admission. Has endocrinologist but she moved and has been mostly communicating via phone - he would like to switch endocrinologist.  -home glipizide and metformin on hold  -he will likely need to start insulin - endocrine consulted, currently on Lantus 25U qhs, lispro 8U tidwm. Defer to endocrine for further titration  -abx in NS if able  -FSBG qac/qhs with SSI    Essential hypertension  Takes lisinopril 20mg once daily at home. BP at goal  -continue home meds    CAD s/p PCI - continue home ASA/statin/plavix  Hyperlipidemia - continue home statin  Obesity - BMI is 30.6, affects all aspects of care     Dispo: possibly to home in 24-48 hours per primary team, pending cultures
A/P: Pt is a 44 y/o M with PMHx HTN, HLD, DM with neuropathy, CAD s/p stents who presents to the ED to be admitted and pre-op for right 3rd toe gangrene. Pt states that about 3 weeks ago he accidentally stepped on a nail. s/p right 3rd toe amputation 9/29/21.    Wt 111.1 kg  A1c 11.5  Cr 0.56

## 2021-10-02 NOTE — PROGRESS NOTE ADULT - PROVIDER SPECIALTY LIST ADULT
Vascular Surgery
Vascular Surgery
Wound Care
Hospitalist
Vascular Surgery
Hospitalist
Endocrinology

## 2021-10-02 NOTE — DISCHARGE NOTE NURSING/CASE MANAGEMENT/SOCIAL WORK - NSDCVIVACCINE_GEN_ALL_CORE_FT
Tdap; 28-Sep-2021 21:47; Jorge Stark (BRYCE); [employer]; [id number] (Exp. Date: 15-Jul-2023); IntraMuscular; Deltoid Right.; 0.5 milliLiter(s); VIS (VIS Published: 09-May-2013, VIS Presented: 28-Sep-2021);

## 2021-10-02 NOTE — PROGRESS NOTE ADULT - SUBJECTIVE AND OBJECTIVE BOX
Subjective/Interval events:  No acute overnight events. Pain in foot well controlled. Tolerating diet. No fever/chills. Doing well. Eager to go home.    MEDICATIONS  (STANDING):  ascorbic acid 500 milliGRAM(s) Oral daily  aspirin  chewable 81 milliGRAM(s) Oral every 24 hours  atorvastatin 40 milliGRAM(s) Oral at bedtime  clopidogrel Tablet 75 milliGRAM(s) Oral every 24 hours  dextrose 40% Gel 15 Gram(s) Oral once  dextrose 5%. 1000 milliLiter(s) (50 mL/Hr) IV Continuous <Continuous>  dextrose 5%. 1000 milliLiter(s) (100 mL/Hr) IV Continuous <Continuous>  dextrose 50% Injectable 25 Gram(s) IV Push once  dextrose 50% Injectable 12.5 Gram(s) IV Push once  dextrose 50% Injectable 25 Gram(s) IV Push once  glucagon  Injectable 1 milliGRAM(s) IntraMuscular once  heparin   Injectable 5000 Unit(s) SubCutaneous every 8 hours  influenza   Vaccine 0.5 milliLiter(s) IntraMuscular once  insulin glargine Injectable (LANTUS) 32 Unit(s) SubCutaneous at bedtime  insulin lispro (ADMELOG) corrective regimen sliding scale   SubCutaneous Before meals and at bedtime  insulin lispro Injectable (ADMELOG) 8 Unit(s) SubCutaneous three times a day before meals  lisinopril 20 milliGRAM(s) Oral daily  multivitamin 1 Tablet(s) Oral daily  piperacillin/tazobactam IVPB.. 3.375 Gram(s) IV Intermittent every 6 hours    MEDICATIONS  (PRN):  acetaminophen   Tablet .. 650 milliGRAM(s) Oral every 6 hours PRN Temp greater or equal to 38C (100.4F), Mild Pain (1 - 3)    Vital Signs Last 24 Hrs  T(C): 36.7 (02 Oct 2021 14:05), Max: 36.7 (02 Oct 2021 14:05)  T(F): 98 (02 Oct 2021 14:05), Max: 98 (02 Oct 2021 14:05)  HR: 70 (02 Oct 2021 07:04) (70 - 73)  BP: 119/75 (02 Oct 2021 07:04) (103/59 - 119/75)  BP(mean): 91 (02 Oct 2021 07:04) (74 - 91)  RR: 16 (02 Oct 2021 07:04) (16 - 16)  SpO2: 94% (01 Oct 2021 22:17) (94% - 94%)    PHYSICAL EXAM:  GENERAL: pleasant, appropriate, no acute distress. Participating appropriately in interview  HEENT - NC/AT, EOMI, PERLLA, oropharynx clear without exudate or erythema, moist mucus membranes  NECK: Soft, supple, No JVD, no lymphadenopathy  CHEST/LUNG: Clear to auscultation bilaterally; No rales, rhonchi, wheezing. Normal work of breathing, not tachypneic  HEART: Regular rate and rhythm; No murmurs, rubs, or gallops, normal s1/s2. Warm and well-perfused  ABDOMEN: obese, soft, Nontender, Nondistended. Normoactive bowel sounds.  EXTREMITIES:  2+ Peripheral Pulses, No clubbing, cyanosis, or edema  NEURO:  awake, alert, oriented to person, place, time, and situation. Cranial nerves intact, no motor or sensory deficits. Moves all extremities.  SKIN: No rashes or lesions. R foot wrapped in kerlix without saturation    LABS:  10-01    135  |  100  |  11  ----------------------------<  198<H>  see note   |  26  |  0.59    Ca    9.5      01 Oct 2021 10:46  Phos  3.5     10-01  Mg     1.9     10-01                          13.8   7.09  )-----------( 259      ( 01 Oct 2021 10:45 )             40.4      RADIOLOGY & ADDITIONAL TESTS:  reviewed, none new

## 2021-10-02 NOTE — DISCHARGE NOTE NURSING/CASE MANAGEMENT/SOCIAL WORK - NSDCFUADDAPPT_GEN_ALL_CORE_FT
Please follow up with [facility] at  within 1-2 weeks of your discharge in regards to your diabetes management.

## 2021-10-02 NOTE — PROGRESS NOTE ADULT - SUBJECTIVE AND OBJECTIVE BOX
O/N: JACINTO FERNANDEZ                                      --------------------------------------------------------------------------  PLEASE CHECK WHEN PRESENT:     [  ]Heart Failure     [  ] Acute     [  ] Acute on Chronic     [  ] Chronic  -------------------------------------------------------------------     [  ]Diastolic [HFpEF]     [  ]Systolic [HFrEF]     [  ]Combined [HFpEF & HFrEF]  .................................................................................     [  ]Other:     [ ] Pulmonary Hypertension     [ ] Afib     [x ] Hypertensive Heart Disease  -------------------------------------------------------------------  [ ] Respiratory failure  [ ] Acute cor pulmonale  [ ] Asthma/COPD Exacerbation  [ ] Pleural effusion  [ ] Aspiration pneumonia  [ ] Obstructive Sleep Apnea  -------------------------------------------------------------------  [  ]ROSANNE     [  ]ATN     [  ]Reneal Medullary Necrosis     [  ]Renal Cortical Necrosis     [  ]Other Pathological Lesions:    [  ]CKD 1  [  ]CKD 2  [  ]CKD 3  [  ]CKD 4  [  ]CKD 5[  ]Other  -------------------------------------------------------------------  [ x ]Diabetes  [  ] Diabetic PVD Ulcer  [ x ] Neuropathic ulcer to DM  [  ] Diabetes with Nephropathy  [  ] Osteomyelitis due to diabetes  [  ] Hyperglycemia   [  ]hypoglycemia   --------------------------------------------------------------------  [  ]Malnutrition: See Nutrition Note  [  ]Cachexia  [  ]Other:   [  ]Supplement Ordered:  [  ]Morbid Obesity (BMI >=40]  ---------------------------------------------------------------------  [ ] Sepsis/severe sepsis/septic shock  [ ] Noninfectious SIRS  [ ] UTI  [ ] Pneumonia  -----------------------------------------------------------------------  [ ] Acidosis/alkalosis  [ ] Fluid overload  [ ] Hypokalemia  [ ] Hyperkalemia  [ ] Hypomagnesemia  [ ] Hypophosphatemia  [ ] Hyperphosphatemia  ------------------------------------------------------------------------  [ ] Acute blood loss anemia  [ ] Post op blood loss anemia  [ ] Iron deficiency anemia  [ ] Anemia due to chronic disease  [ ] Hypercoagulable state  [ ] Thrombocytopenia  ----------------------------------------------------------------------  [ ] Cerebral infarction  [ ] Transient ischemia attack  [ ] Encephalopathy - Toxic or Metabolic    A/P: 45y YO Male w/ Hx of HTN, HLD, DM, CAD who presents from Dr. Cary's office with right 3rd toe gangrene after stepping on a nail 3 weeks ago at , now s/p right 3rd toe amputation        Vascular/PAD:  -s/p right 3rd toe amputation 9/29  - daily dressing changes  -Doppler tones noted in right foot   -Continue ASA+ plavix     HLD:  -c/w Atorvastatin 40    HTN:   - c/w lisinopril      CAD/CHF:   -Hx of CAD s/p stents at OSH   -Continue ASA  + Plavix and statin     DM:  -HgA1c 11  -ISS   -Start Insulin as needed   -Endocrine consult     ID:  -Rt foot 3rd toe gangrene s/p amputation   -Continue Vanc+ zosyn for now   -f/u wound cx   -f/u blood cx     DVT PPx: SQH

## 2021-10-04 LAB
CULTURE RESULTS: SIGNIFICANT CHANGE UP
CULTURE RESULTS: SIGNIFICANT CHANGE UP
SPECIMEN SOURCE: SIGNIFICANT CHANGE UP
SPECIMEN SOURCE: SIGNIFICANT CHANGE UP

## 2021-10-07 LAB — SURGICAL PATHOLOGY STUDY: SIGNIFICANT CHANGE UP

## 2021-10-12 ENCOUNTER — APPOINTMENT (OUTPATIENT)
Dept: VASCULAR SURGERY | Facility: CLINIC | Age: 46
End: 2021-10-12
Payer: COMMERCIAL

## 2021-10-12 DIAGNOSIS — L03.031 CELLULITIS OF RIGHT TOE: ICD-10-CM

## 2021-10-12 DIAGNOSIS — E78.5 HYPERLIPIDEMIA, UNSPECIFIED: ICD-10-CM

## 2021-10-12 DIAGNOSIS — I25.10 ATHEROSCLEROTIC HEART DISEASE OF NATIVE CORONARY ARTERY WITHOUT ANGINA PECTORIS: ICD-10-CM

## 2021-10-12 DIAGNOSIS — E66.9 OBESITY, UNSPECIFIED: ICD-10-CM

## 2021-10-12 DIAGNOSIS — Z79.899 OTHER LONG TERM (CURRENT) DRUG THERAPY: ICD-10-CM

## 2021-10-12 DIAGNOSIS — I96 GANGRENE, NOT ELSEWHERE CLASSIFIED: ICD-10-CM

## 2021-10-12 DIAGNOSIS — Z95.5 PRESENCE OF CORONARY ANGIOPLASTY IMPLANT AND GRAFT: ICD-10-CM

## 2021-10-12 DIAGNOSIS — F17.200 NICOTINE DEPENDENCE, UNSPECIFIED, UNCOMPLICATED: ICD-10-CM

## 2021-10-12 DIAGNOSIS — E11.628 TYPE 2 DIABETES MELLITUS WITH OTHER SKIN COMPLICATIONS: ICD-10-CM

## 2021-10-12 DIAGNOSIS — I10 ESSENTIAL (PRIMARY) HYPERTENSION: ICD-10-CM

## 2021-10-12 DIAGNOSIS — Z79.84 LONG TERM (CURRENT) USE OF ORAL HYPOGLYCEMIC DRUGS: ICD-10-CM

## 2021-10-12 DIAGNOSIS — E11.40 TYPE 2 DIABETES MELLITUS WITH DIABETIC NEUROPATHY, UNSPECIFIED: ICD-10-CM

## 2021-10-12 DIAGNOSIS — Z79.82 LONG TERM (CURRENT) USE OF ASPIRIN: ICD-10-CM

## 2021-10-12 PROCEDURE — 99213 OFFICE O/P EST LOW 20 MIN: CPT

## 2021-10-12 PROCEDURE — 93926 LOWER EXTREMITY STUDY: CPT

## 2021-10-14 NOTE — ADDENDUM
[FreeTextEntry1] : I, Dr. Thomas Cary  have read and attest that all the information, medical decision making and discharge instructions within are true and accurate. I personally performed the evaluation and management (E/M) services for this established patient who presents today with (a) new problem(s)/exacerbation of (an) existing condition(s).  That E/M includes conducting the examination, assessing all new/exacerbated conditions, and establishing a new plan of care.  Today, my ACP, Yennifer Nassar PA-C, was here to observe my evaluation and management services for this new problem/exacerbated condition to be followed going forward.\par

## 2021-10-14 NOTE — DISCUSSION/SUMMARY
[FreeTextEntry1] : 45 year old male with right third toe gangrene after stepping on a nail, poorly controlled DM. He has been cleaning the wound daily and applying wet to dry since discharge.\par \par Wound is not healing and has developed some slough over the wound that was debrided in the office today with minimal bleeding. Arterial US done today to evaluate for PAD showing LAZARO with some stenosis otherwise good waveforms. Will see him back in one week and if amp site is not better will consider angiogram/PTA/stent.

## 2021-10-14 NOTE — REASON FOR VISIT
[de-identified] : right third toe ampuation [de-identified] : 9/29/21 [de-identified] : 45 year old male with right third toe gangrene after stepping on a nail, poorly controlled DM. He has been cleaning the wound daily and applying wet to dry since discharge. He is doing well, no pain.

## 2021-10-14 NOTE — PHYSICAL EXAM
[Respiratory Effort] : normal respiratory effort [Normal Heart Sounds] : normal heart sounds [2+] : right 2+ [1+] : right 1+ [Alert] : alert [Oriented to Person] : oriented to person [Oriented to Place] : oriented to place [Oriented to Time] : oriented to time [Calm] : calm [Ankle Swelling (On Exam)] : not present [Varicose Veins Of Lower Extremities] : not present [] : not present [de-identified] : well appearing [de-identified] : third toe amp site slightly macerated with some slough in the center of the wound.

## 2021-10-19 ENCOUNTER — APPOINTMENT (OUTPATIENT)
Dept: ENDOCRINOLOGY | Facility: CLINIC | Age: 46
End: 2021-10-19
Payer: COMMERCIAL

## 2021-10-19 ENCOUNTER — APPOINTMENT (OUTPATIENT)
Dept: VASCULAR SURGERY | Facility: CLINIC | Age: 46
End: 2021-10-19
Payer: COMMERCIAL

## 2021-10-19 VITALS
HEIGHT: 75 IN | BODY MASS INDEX: 30.46 KG/M2 | WEIGHT: 245 LBS | HEART RATE: 77 BPM | SYSTOLIC BLOOD PRESSURE: 124 MMHG | DIASTOLIC BLOOD PRESSURE: 79 MMHG

## 2021-10-19 VITALS
BODY MASS INDEX: 31.21 KG/M2 | DIASTOLIC BLOOD PRESSURE: 80 MMHG | TEMPERATURE: 96.7 F | WEIGHT: 251 LBS | HEART RATE: 77 BPM | SYSTOLIC BLOOD PRESSURE: 121 MMHG | HEIGHT: 75 IN

## 2021-10-19 DIAGNOSIS — I96 GANGRENE, NOT ELSEWHERE CLASSIFIED: ICD-10-CM

## 2021-10-19 LAB — GLUCOSE BLDC GLUCOMTR-MCNC: 160

## 2021-10-19 PROCEDURE — 99213 OFFICE O/P EST LOW 20 MIN: CPT

## 2021-10-19 PROCEDURE — 99204 OFFICE O/P NEW MOD 45 MIN: CPT

## 2021-10-19 RX ORDER — GLIPIZIDE 10 MG/1
10 TABLET, FILM COATED, EXTENDED RELEASE ORAL
Qty: 90 | Refills: 1 | Status: DISCONTINUED | COMMUNITY
Start: 2021-02-27 | End: 2021-10-19

## 2021-10-19 RX ORDER — ICOSAPENT ETHYL 1000 MG/1
1 CAPSULE ORAL TWICE DAILY
Refills: 0 | Status: DISCONTINUED | COMMUNITY
End: 2021-10-19

## 2021-10-19 RX ORDER — METFORMIN HYDROCHLORIDE 1000 MG/1
1000 TABLET, COATED ORAL TWICE DAILY
Qty: 180 | Refills: 3 | Status: ACTIVE | COMMUNITY
Start: 2021-02-27 | End: 1900-01-01

## 2021-10-19 RX ORDER — INSULIN ASPART 100 [IU]/ML
100 INJECTION, SOLUTION INTRAVENOUS; SUBCUTANEOUS DAILY
Qty: 1 | Refills: 1 | Status: DISCONTINUED | COMMUNITY
Start: 2020-07-06 | End: 2021-10-19

## 2021-10-19 NOTE — REVIEW OF SYSTEMS
[Fatigue] : no fatigue [Decreased Appetite] : appetite not decreased [Dysphagia] : no dysphagia [Dysphonia] : no dysphonia [Chest Pain] : no chest pain [Slow Heart Rate] : heart rate is not slow [Palpitations] : no palpitations [Fast Heart Rate] : heart rate is not fast [Shortness Of Breath] : no shortness of breath [Nausea] : no nausea [Constipation] : no constipation [Vomiting] : no vomiting [As Noted in HPI] : as noted in HPI [Joint Pain] : no joint pain [Ulcer] : ulcer [Headaches] : no headaches [Depression] : no depression [Cold Intolerance] : no cold intolerance

## 2021-10-19 NOTE — ASSESSMENT
[Diabetes Foot Care] : diabetes foot care [Long Term Vascular Complications] : long term vascular complications of diabetes [Carbohydrate Consistent Diet] : carbohydrate consistent diet [Importance of Diet and Exercise] : importance of diet and exercise to improve glycemic control, achieve weight loss and improve cardiovascular health [Exercise/Effect on Glucose] : exercise/effect on glucose [Hypoglycemia Management] : hypoglycemia management [Action and use of Insulin] : action and use of short and long-acting insulin [Self Monitoring of Blood Glucose] : self monitoring of blood glucose [Insulin Self-Administration] : insulin self-administration [Injection Technique, Storage, Sharps Disposal] : injection technique, storage, and sharps disposal [Retinopathy Screening] : Patient was referred to ophthalmology for retinopathy screening [Weight Loss] : weight loss [FreeTextEntry1] : Patient is a 44 yo man with uncontrolled type 2 diabetes and HLD here to establish endocrine care\par \par 1. Uncontrolled T2DM\par -recent toe amputation Right #3, complications include stents and retinopathy\par -discussed the risks of micro/macrovascular events including, but not limited to, heart attack/stroke/eye complications/kidney disease at length\par -importance of glycemic control discussed; goal A1c of 7-7.5%\par -diet remains relatively healthy and he states good understanding of what a healthy diet is. Does not drink soda, mostly home cooked meals, rare delivery\par -importance of self-monitoring of blood glucose also discussed.  Goal fasting glucose 100-130 with 2 hour post-prandial goals < 180\par -dilated eye exam up to date\par -continued care with vascular\par -continue Lantus 38 and humalog 8 with breakfast, increase to 12 units with dinner as that is the biggest meal of the day; restart Lantus, stop glipizide\par -denies any nausea/vomiting/diarrhea\par -denies personal/family history of pancreatitis/MEN/MTC\par -Rx for Free Style Leticia sent to pharmacy\par -Ideally, the patient would benefit from Jardiance. However, in the setting of recent/acute amputation  hold off on SGL2 class of medicines as some studies have shown increased risks of toe/foot amputation. We can consider this medicine in the future\par -patient would benefit from GLP1 agonist for weight loss and improved glycemic control. He took ozempic years ago with GI side effects but willing to re-try GLP1 agonist\par -TG in the hospital was > 400 so repeat today. If TG is improved, anticipate starting Trulicity weekly\par \par 2. CAD\par -statin\par \par 3. HLD\par -LDL goal < 70\par -statin\par \par Follow up in 2 months. Call with hypo/hyperglycemic excursions\par

## 2021-10-19 NOTE — HISTORY OF PRESENT ILLNESS
[FreeTextEntry1] : Patient is a 46 yo man with uncontrolled T2DM (A1c of 11.5%), recent amputation of 3rd toe, HTN and HLD establishing endocrine care today.\par \par Diabetes diagnosed at age 24 years. Was on insulin at some point in the past but stopped after duration of 3 years due to reported GI side effects. Had been on ozempic as well but caused GI problems.  Used to see Dr. Edel Jo but she moved to [place]-who thought it was a tumor.  He had testing and was told it wasn't release of cortisol.  Denies any polyuria, no polydipsia.  Drinks 6-7 bottles of water not because of thirst.\par Most recent medicines were metformin 1000 mg BID and glipizide 10 mg.  \par He was hospitalized in September for amputation after stepping on a nail.\par Started on basal/bolus insulin.\par Adheres to humalog 8 TID with meals and Lantus 38 at bedtime\par AM: 200-300\par Pre-lunch: 100-150\par No hypoglycemia\par Breakfast: sometimes skips, usually boiled egg, slice of whole grain bread with cream cheese\par Larger meal 7-9 PM: dinner last night was piece of steak and salad\par Once a week sushi on Fridays\par Snack: piece of cake for daughter birthday\par No fast food\par Diet Coke\par Dilated eye exam: retinopathy mild\par Feels better when he doesn't eat during the day\par Cigarettes on and off for 25 years, stopped 3 months ago\par Mother and siblings with type 2 diabetes

## 2021-10-19 NOTE — PHYSICAL EXAM
[Alert] : alert [Well Nourished] : well nourished [No Acute Distress] : no acute distress [EOMI] : extra ocular movement intact [Normal Hearing] : hearing was normal [No Respiratory Distress] : no respiratory distress [No Accessory Muscle Use] : no accessory muscle use [Clear to Auscultation] : lungs were clear to auscultation bilaterally [Normal S1, S2] : normal S1 and S2 [Normal Rate] : heart rate was normal [Normal Bowel Sounds] : normal bowel sounds [Not Tender] : non-tender [Soft] : abdomen soft [No Stigmata of Cushings Syndrome] : no stigmata of Cushings Syndrome [Foot Ulcers] : foot ulcers present [Right foot was examined, including] : right foot ~C was examined, including visual inspection with sensory and pulse exams [Left foot was examined, including] : left foot ~C was examined, including visual inspection with sensory and pulse exams [No Motor Deficits] : the motor exam was normal [Normal Affect] : the affect was normal [Normal Insight/Judgement] : insight and judgment were intact [Normal Mood] : the mood was normal [de-identified] : right toe was just dressed by vascular prior to appointment, removal of dressing deferred. Left foot toe #1 blackened toe, no open ulceres

## 2021-10-20 PROBLEM — I96 TOE GANGRENE: Status: ACTIVE | Noted: 2021-09-29

## 2021-10-20 LAB
ALBUMIN SERPL ELPH-MCNC: 5.1 G/DL
ALP BLD-CCNC: 63 U/L
ALT SERPL-CCNC: 36 U/L
ANION GAP SERPL CALC-SCNC: 14 MMOL/L
AST SERPL-CCNC: 22 U/L
BILIRUB SERPL-MCNC: 0.6 MG/DL
BUN SERPL-MCNC: 20 MG/DL
CALCIUM SERPL-MCNC: 10.6 MG/DL
CHLORIDE SERPL-SCNC: 100 MMOL/L
CHOLEST SERPL-MCNC: 134 MG/DL
CO2 SERPL-SCNC: 25 MMOL/L
CREAT SERPL-MCNC: 0.69 MG/DL
CREAT SPEC-SCNC: 53 MG/DL
ESTIMATED AVERAGE GLUCOSE: 255 MG/DL
GLUCOSE SERPL-MCNC: 147 MG/DL
HBA1C MFR BLD HPLC: 10.5 %
HDLC SERPL-MCNC: 32 MG/DL
LDLC SERPL CALC-MCNC: 71 MG/DL
MICROALBUMIN 24H UR DL<=1MG/L-MCNC: 1.3 MG/DL
MICROALBUMIN/CREAT 24H UR-RTO: 25 MG/G
NONHDLC SERPL-MCNC: 102 MG/DL
POTASSIUM SERPL-SCNC: 4.9 MMOL/L
PROT SERPL-MCNC: 7.5 G/DL
SODIUM SERPL-SCNC: 139 MMOL/L
TRIGL SERPL-MCNC: 153 MG/DL

## 2021-10-20 RX ORDER — BLOOD-GLUCOSE METER
W/DEVICE EACH MISCELLANEOUS
Qty: 1 | Refills: 0 | Status: ACTIVE | COMMUNITY
Start: 2021-10-02

## 2021-10-20 RX ORDER — LANCETS 33 GAUGE
EACH MISCELLANEOUS
Qty: 100 | Refills: 0 | Status: ACTIVE | COMMUNITY
Start: 2021-10-02

## 2021-10-20 RX ORDER — SULFAMETHOXAZOLE AND TRIMETHOPRIM 800; 160 MG/1; MG/1
800-160 TABLET ORAL
Qty: 28 | Refills: 0 | Status: DISCONTINUED | COMMUNITY
Start: 2021-10-02 | End: 2021-10-20

## 2021-10-20 RX ORDER — AMOXICILLIN 875 MG/1
875 TABLET, FILM COATED ORAL
Qty: 28 | Refills: 0 | Status: DISCONTINUED | COMMUNITY
Start: 2021-10-02 | End: 2021-10-20

## 2021-10-22 NOTE — DISCUSSION/SUMMARY
[FreeTextEntry1] : 45 year old male with right third toe gangrene after stepping on a nail, poorly controlled DM. He has been cleaning the wound daily and applying wet to dry since discharge.\par \par Wound is is stable and has not changed much in the past week. Discussed doing angiogram/plasty.stent last week if wound did not improve. At this time he does not want to proceed with surgical intervention and would like to continue with wound care which is not unreasonable. Will see him back in one week and if amp site is not better will consider angiogram/PTA/stent.

## 2021-10-22 NOTE — PHYSICAL EXAM
[Respiratory Effort] : normal respiratory effort [Normal Heart Sounds] : normal heart sounds [2+] : right 2+ [1+] : right 1+ [Alert] : alert [Oriented to Person] : oriented to person [Oriented to Place] : oriented to place [Oriented to Time] : oriented to time [Calm] : calm [Ankle Swelling (On Exam)] : not present [Varicose Veins Of Lower Extremities] : not present [] : not present [de-identified] : well appearing [de-identified] : third toe amp site macerated with some fibrin- stable, slightly imrpoved

## 2021-10-22 NOTE — REASON FOR VISIT
[de-identified] : right third toe ampuation [de-identified] : 9/29/21 [de-identified] : 45 year old male with right third toe gangrene after stepping on a nail, poorly controlled DM. right third toe amputation on 9/29/21. He has been cleaning the wound daily and applying wet to dry since discharge. He is doing well, no pain. Was slightly macerated last week advised to come in today for wound check.

## 2021-10-27 ENCOUNTER — INPATIENT (INPATIENT)
Facility: HOSPITAL | Age: 46
LOS: 4 days | Discharge: HOME HEALTH | DRG: 475 | End: 2021-11-01
Attending: SURGERY | Admitting: SURGERY
Payer: COMMERCIAL

## 2021-10-27 ENCOUNTER — TRANSCRIPTION ENCOUNTER (OUTPATIENT)
Age: 46
End: 2021-10-27

## 2021-10-27 VITALS
DIASTOLIC BLOOD PRESSURE: 88 MMHG | HEIGHT: 75 IN | RESPIRATION RATE: 18 BRPM | OXYGEN SATURATION: 98 % | TEMPERATURE: 98 F | WEIGHT: 244.93 LBS | SYSTOLIC BLOOD PRESSURE: 136 MMHG | HEART RATE: 78 BPM

## 2021-10-27 DIAGNOSIS — E66.9 OBESITY, UNSPECIFIED: ICD-10-CM

## 2021-10-27 DIAGNOSIS — E11.40 TYPE 2 DIABETES MELLITUS WITH DIABETIC NEUROPATHY, UNSPECIFIED: ICD-10-CM

## 2021-10-27 DIAGNOSIS — Z87.891 PERSONAL HISTORY OF NICOTINE DEPENDENCE: ICD-10-CM

## 2021-10-27 DIAGNOSIS — I11.9 HYPERTENSIVE HEART DISEASE WITHOUT HEART FAILURE: ICD-10-CM

## 2021-10-27 DIAGNOSIS — E11.51 TYPE 2 DIABETES MELLITUS WITH DIABETIC PERIPHERAL ANGIOPATHY WITHOUT GANGRENE: ICD-10-CM

## 2021-10-27 DIAGNOSIS — I25.10 ATHEROSCLEROTIC HEART DISEASE OF NATIVE CORONARY ARTERY WITHOUT ANGINA PECTORIS: ICD-10-CM

## 2021-10-27 DIAGNOSIS — T87.43 INFECTION OF AMPUTATION STUMP, RIGHT LOWER EXTREMITY: ICD-10-CM
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
## 2021-10-27 DIAGNOSIS — E78.5 HYPERLIPIDEMIA, UNSPECIFIED: ICD-10-CM

## 2021-10-27 DIAGNOSIS — K21.9 GASTRO-ESOPHAGEAL REFLUX DISEASE WITHOUT ESOPHAGITIS: ICD-10-CM

## 2021-10-27 DIAGNOSIS — M79.606 PAIN IN LEG, UNSPECIFIED: ICD-10-CM

## 2021-10-27 DIAGNOSIS — E11.628 TYPE 2 DIABETES MELLITUS WITH OTHER SKIN COMPLICATIONS: ICD-10-CM

## 2021-10-27 DIAGNOSIS — E83.39 OTHER DISORDERS OF PHOSPHORUS METABOLISM: ICD-10-CM

## 2021-10-27 DIAGNOSIS — E11.52 TYPE 2 DIABETES MELLITUS WITH DIABETIC PERIPHERAL ANGIOPATHY WITH GANGRENE: ICD-10-CM

## 2021-10-27 LAB
ALBUMIN SERPL ELPH-MCNC: 4.6 G/DL — SIGNIFICANT CHANGE UP (ref 3.3–5)
ALP SERPL-CCNC: 59 U/L — SIGNIFICANT CHANGE UP (ref 40–120)
ALT FLD-CCNC: 25 U/L — SIGNIFICANT CHANGE UP (ref 10–45)
ANION GAP SERPL CALC-SCNC: 13 MMOL/L — SIGNIFICANT CHANGE UP (ref 5–17)
APTT BLD: 28.5 SEC — SIGNIFICANT CHANGE UP (ref 27.5–35.5)
AST SERPL-CCNC: 19 U/L — SIGNIFICANT CHANGE UP (ref 10–40)
BASOPHILS # BLD AUTO: 0.05 K/UL — SIGNIFICANT CHANGE UP (ref 0–0.2)
BASOPHILS NFR BLD AUTO: 0.5 % — SIGNIFICANT CHANGE UP (ref 0–2)
BILIRUB SERPL-MCNC: 0.7 MG/DL — SIGNIFICANT CHANGE UP (ref 0.2–1.2)
BLD GP AB SCN SERPL QL: NEGATIVE — SIGNIFICANT CHANGE UP
BUN SERPL-MCNC: 13 MG/DL — SIGNIFICANT CHANGE UP (ref 7–23)
CALCIUM SERPL-MCNC: 10.2 MG/DL — SIGNIFICANT CHANGE UP (ref 8.4–10.5)
CHLORIDE SERPL-SCNC: 104 MMOL/L — SIGNIFICANT CHANGE UP (ref 96–108)
CO2 SERPL-SCNC: 26 MMOL/L — SIGNIFICANT CHANGE UP (ref 22–31)
CREAT SERPL-MCNC: 0.64 MG/DL — SIGNIFICANT CHANGE UP (ref 0.5–1.3)
CRP SERPL-MCNC: 5.5 MG/L — HIGH (ref 0–4)
EOSINOPHIL # BLD AUTO: 0.24 K/UL — SIGNIFICANT CHANGE UP (ref 0–0.5)
EOSINOPHIL NFR BLD AUTO: 2.6 % — SIGNIFICANT CHANGE UP (ref 0–6)
ERYTHROCYTE [SEDIMENTATION RATE] IN BLOOD: 32 MM/HR — HIGH
GLUCOSE SERPL-MCNC: 131 MG/DL — HIGH (ref 70–99)
HCT VFR BLD CALC: 38.3 % — LOW (ref 39–50)
HGB BLD-MCNC: 13 G/DL — SIGNIFICANT CHANGE UP (ref 13–17)
IMM GRANULOCYTES NFR BLD AUTO: 0.4 % — SIGNIFICANT CHANGE UP (ref 0–1.5)
INR BLD: 0.97 — SIGNIFICANT CHANGE UP (ref 0.88–1.16)
LYMPHOCYTES # BLD AUTO: 2.42 K/UL — SIGNIFICANT CHANGE UP (ref 1–3.3)
LYMPHOCYTES # BLD AUTO: 26.4 % — SIGNIFICANT CHANGE UP (ref 13–44)
MCHC RBC-ENTMCNC: 30.4 PG — SIGNIFICANT CHANGE UP (ref 27–34)
MCHC RBC-ENTMCNC: 33.9 GM/DL — SIGNIFICANT CHANGE UP (ref 32–36)
MCV RBC AUTO: 89.5 FL — SIGNIFICANT CHANGE UP (ref 80–100)
MONOCYTES # BLD AUTO: 0.71 K/UL — SIGNIFICANT CHANGE UP (ref 0–0.9)
MONOCYTES NFR BLD AUTO: 7.8 % — SIGNIFICANT CHANGE UP (ref 2–14)
NEUTROPHILS # BLD AUTO: 5.69 K/UL — SIGNIFICANT CHANGE UP (ref 1.8–7.4)
NEUTROPHILS NFR BLD AUTO: 62.3 % — SIGNIFICANT CHANGE UP (ref 43–77)
NRBC # BLD: 0 /100 WBCS — SIGNIFICANT CHANGE UP (ref 0–0)
PLATELET # BLD AUTO: 207 K/UL — SIGNIFICANT CHANGE UP (ref 150–400)
POTASSIUM SERPL-MCNC: 4.1 MMOL/L — SIGNIFICANT CHANGE UP (ref 3.5–5.3)
POTASSIUM SERPL-SCNC: 4.1 MMOL/L — SIGNIFICANT CHANGE UP (ref 3.5–5.3)
PROT SERPL-MCNC: 7.6 G/DL — SIGNIFICANT CHANGE UP (ref 6–8.3)
PROTHROM AB SERPL-ACNC: 11.6 SEC — SIGNIFICANT CHANGE UP (ref 10.6–13.6)
RBC # BLD: 4.28 M/UL — SIGNIFICANT CHANGE UP (ref 4.2–5.8)
RBC # FLD: 12.8 % — SIGNIFICANT CHANGE UP (ref 10.3–14.5)
RH IG SCN BLD-IMP: POSITIVE — SIGNIFICANT CHANGE UP
SARS-COV-2 RNA SPEC QL NAA+PROBE: NEGATIVE — SIGNIFICANT CHANGE UP
SODIUM SERPL-SCNC: 143 MMOL/L — SIGNIFICANT CHANGE UP (ref 135–145)
WBC # BLD: 9.15 K/UL — SIGNIFICANT CHANGE UP (ref 3.8–10.5)
WBC # FLD AUTO: 9.15 K/UL — SIGNIFICANT CHANGE UP (ref 3.8–10.5)

## 2021-10-27 PROCEDURE — 93971 EXTREMITY STUDY: CPT | Mod: 26,RT

## 2021-10-27 PROCEDURE — 71046 X-RAY EXAM CHEST 2 VIEWS: CPT | Mod: 26

## 2021-10-27 PROCEDURE — 99284 EMERGENCY DEPT VISIT MOD MDM: CPT

## 2021-10-27 PROCEDURE — 73630 X-RAY EXAM OF FOOT: CPT | Mod: 26,RT

## 2021-10-27 RX ORDER — SODIUM CHLORIDE 9 MG/ML
1000 INJECTION, SOLUTION INTRAVENOUS
Refills: 0 | Status: DISCONTINUED | OUTPATIENT
Start: 2021-10-27 | End: 2021-11-01

## 2021-10-27 RX ORDER — HEPARIN SODIUM 5000 [USP'U]/ML
5000 INJECTION INTRAVENOUS; SUBCUTANEOUS EVERY 8 HOURS
Refills: 0 | Status: DISCONTINUED | OUTPATIENT
Start: 2021-10-27 | End: 2021-11-01

## 2021-10-27 RX ORDER — DEXTROSE 50 % IN WATER 50 %
25 SYRINGE (ML) INTRAVENOUS ONCE
Refills: 0 | Status: DISCONTINUED | OUTPATIENT
Start: 2021-10-27 | End: 2021-11-01

## 2021-10-27 RX ORDER — DEXTROSE 50 % IN WATER 50 %
12.5 SYRINGE (ML) INTRAVENOUS ONCE
Refills: 0 | Status: DISCONTINUED | OUTPATIENT
Start: 2021-10-27 | End: 2021-11-01

## 2021-10-27 RX ORDER — PIPERACILLIN AND TAZOBACTAM 4; .5 G/20ML; G/20ML
3.38 INJECTION, POWDER, LYOPHILIZED, FOR SOLUTION INTRAVENOUS ONCE
Refills: 0 | Status: COMPLETED | OUTPATIENT
Start: 2021-10-27 | End: 2021-10-27

## 2021-10-27 RX ORDER — PIPERACILLIN AND TAZOBACTAM 4; .5 G/20ML; G/20ML
3.38 INJECTION, POWDER, LYOPHILIZED, FOR SOLUTION INTRAVENOUS EVERY 6 HOURS
Refills: 0 | Status: DISCONTINUED | OUTPATIENT
Start: 2021-10-28 | End: 2021-10-30

## 2021-10-27 RX ORDER — INSULIN GLARGINE 100 [IU]/ML
22 INJECTION, SOLUTION SUBCUTANEOUS AT BEDTIME
Refills: 0 | Status: DISCONTINUED | OUTPATIENT
Start: 2021-10-27 | End: 2021-10-29

## 2021-10-27 RX ORDER — INSULIN LISPRO 100/ML
VIAL (ML) SUBCUTANEOUS
Refills: 0 | Status: DISCONTINUED | OUTPATIENT
Start: 2021-10-27 | End: 2021-11-01

## 2021-10-27 RX ORDER — INSULIN GLARGINE 100 [IU]/ML
18 INJECTION, SOLUTION SUBCUTANEOUS ONCE
Refills: 0 | Status: DISCONTINUED | OUTPATIENT
Start: 2021-10-27 | End: 2021-10-27

## 2021-10-27 RX ORDER — DEXTROSE 50 % IN WATER 50 %
15 SYRINGE (ML) INTRAVENOUS ONCE
Refills: 0 | Status: DISCONTINUED | OUTPATIENT
Start: 2021-10-27 | End: 2021-11-01

## 2021-10-27 RX ORDER — GLUCAGON INJECTION, SOLUTION 0.5 MG/.1ML
1 INJECTION, SOLUTION SUBCUTANEOUS ONCE
Refills: 0 | Status: DISCONTINUED | OUTPATIENT
Start: 2021-10-27 | End: 2021-11-01

## 2021-10-27 RX ORDER — VANCOMYCIN HCL 1 G
1250 VIAL (EA) INTRAVENOUS EVERY 8 HOURS
Refills: 0 | Status: DISCONTINUED | OUTPATIENT
Start: 2021-10-27 | End: 2021-10-29

## 2021-10-27 RX ORDER — SODIUM CHLORIDE 9 MG/ML
1000 INJECTION INTRAMUSCULAR; INTRAVENOUS; SUBCUTANEOUS
Refills: 0 | Status: DISCONTINUED | OUTPATIENT
Start: 2021-10-27 | End: 2021-10-30

## 2021-10-27 RX ORDER — ATORVASTATIN CALCIUM 80 MG/1
40 TABLET, FILM COATED ORAL AT BEDTIME
Refills: 0 | Status: DISCONTINUED | OUTPATIENT
Start: 2021-10-27 | End: 2021-11-01

## 2021-10-27 RX ORDER — ASPIRIN/CALCIUM CARB/MAGNESIUM 324 MG
81 TABLET ORAL DAILY
Refills: 0 | Status: DISCONTINUED | OUTPATIENT
Start: 2021-10-27 | End: 2021-11-01

## 2021-10-27 RX ORDER — ATORVASTATIN CALCIUM 80 MG/1
40 TABLET, FILM COATED ORAL DAILY
Refills: 0 | Status: DISCONTINUED | OUTPATIENT
Start: 2021-10-27 | End: 2021-10-27

## 2021-10-27 RX ORDER — VANCOMYCIN HCL 1 G
VIAL (EA) INTRAVENOUS
Refills: 0 | Status: DISCONTINUED | OUTPATIENT
Start: 2021-10-27 | End: 2021-10-27

## 2021-10-27 RX ORDER — LISINOPRIL 2.5 MG/1
20 TABLET ORAL DAILY
Refills: 0 | Status: DISCONTINUED | OUTPATIENT
Start: 2021-10-27 | End: 2021-10-28

## 2021-10-27 RX ORDER — INSULIN LISPRO 100/ML
8 VIAL (ML) SUBCUTANEOUS
Refills: 0 | Status: DISCONTINUED | OUTPATIENT
Start: 2021-10-27 | End: 2021-11-01

## 2021-10-27 RX ORDER — CLOPIDOGREL BISULFATE 75 MG/1
75 TABLET, FILM COATED ORAL DAILY
Refills: 0 | Status: DISCONTINUED | OUTPATIENT
Start: 2021-10-27 | End: 2021-11-01

## 2021-10-27 RX ADMIN — ATORVASTATIN CALCIUM 40 MILLIGRAM(S): 80 TABLET, FILM COATED ORAL at 21:38

## 2021-10-27 RX ADMIN — HEPARIN SODIUM 5000 UNIT(S): 5000 INJECTION INTRAVENOUS; SUBCUTANEOUS at 21:38

## 2021-10-27 RX ADMIN — PIPERACILLIN AND TAZOBACTAM 200 GRAM(S): 4; .5 INJECTION, POWDER, LYOPHILIZED, FOR SOLUTION INTRAVENOUS at 21:38

## 2021-10-27 RX ADMIN — Medication 2: at 22:10

## 2021-10-27 RX ADMIN — Medication 166.67 MILLIGRAM(S): at 22:11

## 2021-10-27 RX ADMIN — INSULIN GLARGINE 22 UNIT(S): 100 INJECTION, SOLUTION SUBCUTANEOUS at 22:10

## 2021-10-27 RX ADMIN — Medication 8 UNIT(S): at 22:11

## 2021-10-27 NOTE — H&P ADULT - ATTENDING COMMENTS
Patient readmitted with worsening infection at amputation site.  Will admit for IV abx.  Will perform debridement and angiogram on admission.

## 2021-10-27 NOTE — ED PROVIDER NOTE - OBJECTIVE STATEMENT
45 y/o M pt with PMHx of DM and recently s/p amputation of R middle toe for infected foot ulcer after stepping on a nail presents to ED at recommendation of his vascular surgeon, Dr. Cary, after increased drainage and foul odor from wound in past few days. Pt denies fever or chills. He is to be admitted for IV abx and further evaluation. 45 y/o M pt with PMHx of DM, CAD, HTN, HLD and recently s/p amputation of R middle toe for infected foot ulcer after stepping on a nail presents to ED at recommendation of his vascular surgeon, Dr. Cary, after increased drainage onto dressings and foul odor from wound in past few days. No pain at site.  Noticed mild swelling of lower leg  and foot late in the day, but improves after sleeping overnight.  No hx of DVT.  Pt denies fever or chills. He is to be admitted for IV abx and further evaluation; possible angiogram in hospital.     Vaccinated for Covid

## 2021-10-27 NOTE — ED PROVIDER NOTE - PHYSICAL EXAMINATION
CONSTITUTIONAL: NAD   SKIN: Normal color and turgor.    HEAD: NC/AT.  EYES: Conjunctiva clear. EOMI. PERRL.    ENT: Airway clear. Normal voice.   RESPIRATORY:  Normal work of breathing. Lungs CTAB.  CARDIOVASCULAR:  RRR, S1S2. No M/R/G.      GI:  Abdomen soft, nontender.    MSK: Trace RLE edema.  No calf tenderness.  Nonpalpable DP and PT pulses on right, but with biphasic dopplers.  Palpable DP and PT on left with triphasic dopplers.  Stump of right middle toe amputation site with granulation, foul odor, and erythema & warmth at base.  Nontender.    NEURO: Alert and oriented; CN II-XII grossly intact. Speech clear. 5/5 strength in all extremities.  Good balance. Steady gait.

## 2021-10-27 NOTE — ED ADULT NURSE NOTE - NSIMPLEMENTINTERV_GEN_ALL_ED
Implemented All Fall Risk Interventions:  [place] to call system. Call bell, personal items and telephone within reach. Instruct patient to call for assistance. Room bathroom lighting operational. Non-slip footwear when patient is off stretcher. Physically safe environment: no spills, clutter or unnecessary equipment. Stretcher in lowest position, wheels locked, appropriate side rails in place. Provide visual cue, wrist band, yellow gown, etc. Monitor gait and stability. Monitor for mental status changes and reorient to person, place, and time. Review medications for side effects contributing to fall risk. Reinforce activity limits and safety measures with patient and family.

## 2021-10-27 NOTE — ED ADULT NURSE NOTE - OBJECTIVE STATEMENT
46y Male A&OX3 to ED c/o infection. Pt has hx of  Right middle foot amputation post nail puncture and ulcer formation. Pt report Purulent drainage, and odor. Denies pain at site. site dressed. Pt to US off monitor per md order.

## 2021-10-27 NOTE — ED PROVIDER NOTE - NSICDXPASTMEDICALHX_GEN_ALL_CORE_FT
PAST MEDICAL HISTORY:  Coronary artery disease     Hyperlipidemia     Hypertension     Type II diabetes mellitus        PAST MEDICAL HISTORY:  Coronary artery disease     Hyperlipidemia     Hypertension     Type II diabetes mellitus

## 2021-10-27 NOTE — ED ADULT NURSE REASSESSMENT NOTE - NS ED NURSE REASSESS COMMENT FT1
FS done at 2150 with result of 183mg/dl, result not crossing over sunrise.
Report received at around 2030 from ER BRYCE Galicia, as per RN pt @ US. AOX3. Left ER for US in stable condition.
Received patient in stretcher from ultrasound in a stable condition. AOX4. VSS.  Patient denies chest pain, pain, discomfort, shortness of breath, difficulty breathing and any form of distress not noted. Patient oriented to ED area. Plan of care discussed and verbalized understanding. All needs attended. Pt on cardiac monitor. Fall risk precautions maintained. Purposeful proactive hourly rounding in progress.

## 2021-10-27 NOTE — H&P ADULT - ASSESSMENT
47 y/o M pt with PMHx of DM, CAD, HTN, HLD  s/p amputation of R 3rd toe for gangrene  9/28/21, admitted w infection to amputation site.    -Admit to Vascular-> Dr Cary  -Keep NPO after MN  -Consented for Debridement and bone resection in am  -Home meds  -Lantus 1/2'd to 18U  -IV Abx Vanc/Zosyn

## 2021-10-27 NOTE — H&P ADULT - NSHPPHYSICALEXAM_GEN_ALL_CORE
PHYSICAL EXAM:    Constitutional: Pt AXOX3 in NAD  Respiratory: Non labored  Cardiovascular: S1S2  Extremities: No edema or erythema to legs, R third digit amp site with fibrinous tissue, mild drainage, cultures obtained  Vascular: R Tri DP/PT L palp DP  Neurological: intact motor sensori

## 2021-10-27 NOTE — H&P ADULT - HISTORY OF PRESENT ILLNESS
47 y/o M pt with PMHx of DM, CAD, HTN, HLD and recently s/p amputation of R 3rd toe for gangrene after stepping on a nail 9/28/21.  Pt presents to ED c/o, increased drainage  and foul odor from wound in past few days. No pain at site.  Noticed mild swelling of lower leg  and foot late in the day, but improves after sleeping overnight.  No hx of DVT.  Pt denies fever or chills.

## 2021-10-27 NOTE — ED PROVIDER NOTE - CLINICAL SUMMARY MEDICAL DECISION MAKING FREE TEXT BOX
Diabetic man with CAD presenting with early localized infection at base of right 3rd toe amputation site.  Appears nontoxic, HD stable.  Will get labs, US to r/o DVT for mild swelling of leg, admit.

## 2021-10-28 ENCOUNTER — TRANSCRIPTION ENCOUNTER (OUTPATIENT)
Age: 46
End: 2021-10-28

## 2021-10-28 ENCOUNTER — RESULT REVIEW (OUTPATIENT)
Age: 46
End: 2021-10-28

## 2021-10-28 LAB
ANION GAP SERPL CALC-SCNC: 9 MMOL/L — SIGNIFICANT CHANGE UP (ref 5–17)
BASOPHILS # BLD AUTO: 0.04 K/UL — SIGNIFICANT CHANGE UP (ref 0–0.2)
BASOPHILS NFR BLD AUTO: 0.5 % — SIGNIFICANT CHANGE UP (ref 0–2)
BLD GP AB SCN SERPL QL: NEGATIVE — SIGNIFICANT CHANGE UP
BUN SERPL-MCNC: 13 MG/DL — SIGNIFICANT CHANGE UP (ref 7–23)
CALCIUM SERPL-MCNC: 9.1 MG/DL — SIGNIFICANT CHANGE UP (ref 8.4–10.5)
CHLORIDE SERPL-SCNC: 104 MMOL/L — SIGNIFICANT CHANGE UP (ref 96–108)
CO2 SERPL-SCNC: 26 MMOL/L — SIGNIFICANT CHANGE UP (ref 22–31)
COVID-19 SPIKE DOMAIN AB INTERP: POSITIVE
COVID-19 SPIKE DOMAIN ANTIBODY RESULT: >250 U/ML — HIGH
CREAT SERPL-MCNC: 0.69 MG/DL — SIGNIFICANT CHANGE UP (ref 0.5–1.3)
EOSINOPHIL # BLD AUTO: 0.21 K/UL — SIGNIFICANT CHANGE UP (ref 0–0.5)
EOSINOPHIL NFR BLD AUTO: 2.8 % — SIGNIFICANT CHANGE UP (ref 0–6)
GLUCOSE BLDC GLUCOMTR-MCNC: 105 MG/DL — HIGH (ref 70–99)
GLUCOSE BLDC GLUCOMTR-MCNC: 175 MG/DL — HIGH (ref 70–99)
GLUCOSE BLDC GLUCOMTR-MCNC: 183 MG/DL — HIGH (ref 70–99)
GLUCOSE BLDC GLUCOMTR-MCNC: 196 MG/DL — HIGH (ref 70–99)
GLUCOSE BLDC GLUCOMTR-MCNC: 207 MG/DL — HIGH (ref 70–99)
GLUCOSE BLDC GLUCOMTR-MCNC: 246 MG/DL — HIGH (ref 70–99)
GLUCOSE SERPL-MCNC: 237 MG/DL — HIGH (ref 70–99)
GRAM STN FLD: SIGNIFICANT CHANGE UP
HCT VFR BLD CALC: 37.3 % — LOW (ref 39–50)
HGB BLD-MCNC: 12.6 G/DL — LOW (ref 13–17)
IMM GRANULOCYTES NFR BLD AUTO: 0.3 % — SIGNIFICANT CHANGE UP (ref 0–1.5)
LYMPHOCYTES # BLD AUTO: 1.6 K/UL — SIGNIFICANT CHANGE UP (ref 1–3.3)
LYMPHOCYTES # BLD AUTO: 21.4 % — SIGNIFICANT CHANGE UP (ref 13–44)
MAGNESIUM SERPL-MCNC: 1.6 MG/DL — SIGNIFICANT CHANGE UP (ref 1.6–2.6)
MCHC RBC-ENTMCNC: 30.9 PG — SIGNIFICANT CHANGE UP (ref 27–34)
MCHC RBC-ENTMCNC: 33.8 GM/DL — SIGNIFICANT CHANGE UP (ref 32–36)
MCV RBC AUTO: 91.4 FL — SIGNIFICANT CHANGE UP (ref 80–100)
MONOCYTES # BLD AUTO: 0.53 K/UL — SIGNIFICANT CHANGE UP (ref 0–0.9)
MONOCYTES NFR BLD AUTO: 7.1 % — SIGNIFICANT CHANGE UP (ref 2–14)
NEUTROPHILS # BLD AUTO: 5.09 K/UL — SIGNIFICANT CHANGE UP (ref 1.8–7.4)
NEUTROPHILS NFR BLD AUTO: 67.9 % — SIGNIFICANT CHANGE UP (ref 43–77)
NRBC # BLD: 0 /100 WBCS — SIGNIFICANT CHANGE UP (ref 0–0)
PHOSPHATE SERPL-MCNC: 4.7 MG/DL — HIGH (ref 2.5–4.5)
PLATELET # BLD AUTO: 199 K/UL — SIGNIFICANT CHANGE UP (ref 150–400)
POTASSIUM SERPL-MCNC: 4.6 MMOL/L — SIGNIFICANT CHANGE UP (ref 3.5–5.3)
POTASSIUM SERPL-SCNC: 4.6 MMOL/L — SIGNIFICANT CHANGE UP (ref 3.5–5.3)
RBC # BLD: 4.08 M/UL — LOW (ref 4.2–5.8)
RBC # FLD: 12.6 % — SIGNIFICANT CHANGE UP (ref 10.3–14.5)
RH IG SCN BLD-IMP: POSITIVE — SIGNIFICANT CHANGE UP
SARS-COV-2 IGG+IGM SERPL QL IA: >250 U/ML — HIGH
SARS-COV-2 IGG+IGM SERPL QL IA: POSITIVE
SODIUM SERPL-SCNC: 139 MMOL/L — SIGNIFICANT CHANGE UP (ref 135–145)
SPECIMEN SOURCE: SIGNIFICANT CHANGE UP
VANCOMYCIN TROUGH SERPL-MCNC: 13.3 UG/ML — SIGNIFICANT CHANGE UP (ref 10–20)
WBC # BLD: 7.49 K/UL — SIGNIFICANT CHANGE UP (ref 3.8–10.5)
WBC # FLD AUTO: 7.49 K/UL — SIGNIFICANT CHANGE UP (ref 3.8–10.5)

## 2021-10-28 PROCEDURE — 88304 TISSUE EXAM BY PATHOLOGIST: CPT | Mod: 26

## 2021-10-28 PROCEDURE — 28810 AMPUTATION TOE & METATARSAL: CPT | Mod: GC

## 2021-10-28 PROCEDURE — 99223 1ST HOSP IP/OBS HIGH 75: CPT

## 2021-10-28 PROCEDURE — 88311 DECALCIFY TISSUE: CPT | Mod: 26

## 2021-10-28 RX ORDER — INFLUENZA VIRUS VACCINE 15; 15; 15; 15 UG/.5ML; UG/.5ML; UG/.5ML; UG/.5ML
0.5 SUSPENSION INTRAMUSCULAR ONCE
Refills: 0 | Status: DISCONTINUED | OUTPATIENT
Start: 2021-10-28 | End: 2021-11-01

## 2021-10-28 RX ORDER — MAGNESIUM SULFATE 500 MG/ML
1 VIAL (ML) INJECTION ONCE
Refills: 0 | Status: COMPLETED | OUTPATIENT
Start: 2021-10-28 | End: 2021-10-28

## 2021-10-28 RX ADMIN — HEPARIN SODIUM 5000 UNIT(S): 5000 INJECTION INTRAVENOUS; SUBCUTANEOUS at 14:25

## 2021-10-28 RX ADMIN — SODIUM CHLORIDE 100 MILLILITER(S): 9 INJECTION INTRAMUSCULAR; INTRAVENOUS; SUBCUTANEOUS at 00:20

## 2021-10-28 RX ADMIN — HEPARIN SODIUM 5000 UNIT(S): 5000 INJECTION INTRAVENOUS; SUBCUTANEOUS at 22:32

## 2021-10-28 RX ADMIN — Medication 166.67 MILLIGRAM(S): at 06:34

## 2021-10-28 RX ADMIN — Medication 2: at 22:31

## 2021-10-28 RX ADMIN — INSULIN GLARGINE 22 UNIT(S): 100 INJECTION, SOLUTION SUBCUTANEOUS at 22:32

## 2021-10-28 RX ADMIN — PIPERACILLIN AND TAZOBACTAM 200 GRAM(S): 4; .5 INJECTION, POWDER, LYOPHILIZED, FOR SOLUTION INTRAVENOUS at 22:31

## 2021-10-28 RX ADMIN — Medication 166.67 MILLIGRAM(S): at 23:27

## 2021-10-28 RX ADMIN — PIPERACILLIN AND TAZOBACTAM 200 GRAM(S): 4; .5 INJECTION, POWDER, LYOPHILIZED, FOR SOLUTION INTRAVENOUS at 03:18

## 2021-10-28 RX ADMIN — HEPARIN SODIUM 5000 UNIT(S): 5000 INJECTION INTRAVENOUS; SUBCUTANEOUS at 06:33

## 2021-10-28 RX ADMIN — ATORVASTATIN CALCIUM 40 MILLIGRAM(S): 80 TABLET, FILM COATED ORAL at 22:32

## 2021-10-28 RX ADMIN — Medication 8 UNIT(S): at 16:38

## 2021-10-28 RX ADMIN — Medication 100 GRAM(S): at 11:22

## 2021-10-28 RX ADMIN — Medication 81 MILLIGRAM(S): at 11:22

## 2021-10-28 RX ADMIN — PIPERACILLIN AND TAZOBACTAM 200 GRAM(S): 4; .5 INJECTION, POWDER, LYOPHILIZED, FOR SOLUTION INTRAVENOUS at 10:08

## 2021-10-28 RX ADMIN — PIPERACILLIN AND TAZOBACTAM 200 GRAM(S): 4; .5 INJECTION, POWDER, LYOPHILIZED, FOR SOLUTION INTRAVENOUS at 16:47

## 2021-10-28 RX ADMIN — CLOPIDOGREL BISULFATE 75 MILLIGRAM(S): 75 TABLET, FILM COATED ORAL at 11:22

## 2021-10-28 RX ADMIN — Medication 2: at 12:20

## 2021-10-28 RX ADMIN — Medication 166.67 MILLIGRAM(S): at 14:20

## 2021-10-28 RX ADMIN — LISINOPRIL 20 MILLIGRAM(S): 2.5 TABLET ORAL at 06:33

## 2021-10-28 NOTE — CONSULT NOTE ADULT - SUBJECTIVE AND OBJECTIVE BOX
CC: foot pain/odor    HPI:  Per admission H&P:  "47 y/o M pt with PMHx of DM, CAD, HTN, HLD and recently s/p amputation of R 3rd toe for gangrene after stepping on a nail 9/28/21.  Pt presents to ED c/o, increased drainage  and foul odor from wound in past few days. No pain at site.  Noticed mild swelling of lower leg  and foot late in the day, but improves after sleeping overnight.  No hx of DVT.  Pt denies fever or chills. "    Patient admitted to vascular surgery service, started on vanco/zosyn, seen by me today. Planned for OR today for debridement. He confirms above history, denies any fever, chills, other symptoms. He has been adherent to his insulin and been seeing an endocrinologist, monitoring his BGs with CGM device.      12 point ROS reviewed and negative except as otherwise stated in HPI and below    PAST MEDICAL & SURGICAL HISTORY:  Type II diabetes mellitus    Hyperlipidemia    Hypertension    Coronary artery disease      SOCIAL HISTORY:  Tobacco: quit a couple of months ago, still smoking occasionally though  Alcohol: denies  Illicit Drugs: denies  Living situation: with wife and two kids  ADLs: independent  Works in construction management    FAMILY HISTORY:  Mother's side all has DM, including his mother and brother  Father had MI    ALLERGIES:  No Known Allergies      HOME MEDICATIONS:  atorvastatin 40 mg oral tablet: 1 tab(s) orally once a day  lisinopril 20 mg oral tablet: 1 tab(s) orally once a day  Plavix 75mg once daily  ASA 81mg once daily  Humalog 14U with breakfast, 8U with lunch/dinner  Lantus 44U once daily    Vital Signs Last 24 Hrs  T(F): 98.6 (28 Oct 2021 09:05), Max: 98.6 (27 Oct 2021 21:54)  HR: 70 (28 Oct 2021 06:00) (70 - 78)  BP: 127/68 (28 Oct 2021 06:00) (124/63 - 136/88)  RR: 16 (28 Oct 2021 06:00) (16 - 18)  SpO2: 96% (28 Oct 2021 06:00) (96% - 98%)    PHYSICAL EXAM:  GENERAL: pleasant, appropriate, no acute distress, well-groomed, well-developed  HEAD:  Atraumatic, Normocephalic  EYES: EOMI, PERRLA, conjunctiva and sclera clear  ENMT: No tonsillar erythema, exudates, or enlargement; Moist mucous membranes, Good dentition  NECK: Supple, No JVD  CHEST/LUNG: Clear to auscultation bilaterally; No rales, rhonchi, wheezing, or rubs  HEART: Regular rate and rhythm; S1/S2, No murmurs, rubs, or gallops  ABDOMEN: obese, soft, Nontender, Nondistended; Bowel sounds present  VASCULAR: Normal pulses, Normal capillary refill  EXTREMITIES:  2+ Peripheral Pulses, No cyanosis, No edema  LYMPH: No lymphadenopathy noted  SKIN: Warm, Intact. R foot wrapped in kerlix, no drainage  PSYCH: Normal mood and affect  NERVOUS SYSTEM:  A/O x3, CN 2-12 intact, No focal deficits    LABS:                        12.6   7.49  )-----------( 199      ( 28 Oct 2021 06:45 )             37.3     10-28    139  |  104  |  13  ----------------------------<  237  4.6   |  26  |  0.69    Ca    9.1      28 Oct 2021 06:45  Phos  4.7     10-28  Mg     1.6     10-28    TPro  7.6  /  Alb  4.6  /  TBili  0.7  /  DBili  x   /  AST  19  /  ALT  25  /  AlkPhos  59  10-27    PT/INR - ( 27 Oct 2021 20:06 )   PT: 11.6 sec;   INR: 0.97          PTT - ( 27 Oct 2021 20:06 )  PTT:28.5 sec      Culture - Blood (collected 27 Oct 2021 20:42)  Source: .Blood Blood-Peripheral  Preliminary Report (28 Oct 2021 09:01):    No growth at 12 hours    Culture - Blood (collected 27 Oct 2021 20:42)  Source: .Blood Blood-Peripheral  Preliminary Report (28 Oct 2021 09:01):    No growth at 12 hours      COVID-19 PCR: Negative (10-27-21 @ 20:06)  COVID-19 PCR: Negative (09-28-21 @ 21:24)    RADIOLOGY & ADDITIONAL TESTS:  < from: US Duplex Venous Lower Ext Ltd, Right (10.27.21 @ 21:02) >  FINDINGS:    There is normal compressibility of the right common femoral, femoral and popliteal veins.  The contralateral common femoral vein is patent.  Doppler examination shows normal spontaneous and phasic flow.    No calf vein thrombosis is detected.    IMPRESSION:  No evidence of right lower extremity deep venous thrombosis.    < end of copied text >

## 2021-10-28 NOTE — CONSULT NOTE ADULT - ASSESSMENT
45YOM with history of obesity (BMI 30.6), type 2 diabetes (a1c Sept 2021 11.5%, started on insulin at that time), essential HTN, CAD s/p PCI, HLD, recent R 3rd toe amputation admitted to vascular surgery service with concern for surgical site infection.    R 3rd toe surgical site infection  Diabetic foot infection  Initial injury occurred after stepping on nail at work. s/p amputation of R 3rd toe 9/29, though with concern for slow healing and surgical site infection  -on vanco/zosyn for now  -to OR today with vascular - f/u OR cultures    Type 2 diabetes  Hgb a1c 11.5% last admission Sept 2021. Follows with Dr. Linder. Home meds include metformin, Lantus 44U qhs, lispro 14U with breakfast, 8U with lunch and dinner  -home metformin on hold  -continue home insulin regimen - Lantus halved for OR  -FSBG qac/qhs with SSI    Essential hypertension  Takes lisinopril 20mg once daily at home. BP at goal  -continue home meds    CAD s/p PCI - continue home ASA/statin/plavix  Hyperlipidemia - continue home statin  Obesity - BMI is 30.6, affects all aspects of care     Dispo: pending OR

## 2021-10-29 LAB
ANION GAP SERPL CALC-SCNC: 8 MMOL/L — SIGNIFICANT CHANGE UP (ref 5–17)
BUN SERPL-MCNC: 11 MG/DL — SIGNIFICANT CHANGE UP (ref 7–23)
CALCIUM SERPL-MCNC: 9.1 MG/DL — SIGNIFICANT CHANGE UP (ref 8.4–10.5)
CHLORIDE SERPL-SCNC: 104 MMOL/L — SIGNIFICANT CHANGE UP (ref 96–108)
CO2 SERPL-SCNC: 26 MMOL/L — SIGNIFICANT CHANGE UP (ref 22–31)
CREAT SERPL-MCNC: 0.66 MG/DL — SIGNIFICANT CHANGE UP (ref 0.5–1.3)
GLUCOSE BLDC GLUCOMTR-MCNC: 136 MG/DL — HIGH (ref 70–99)
GLUCOSE BLDC GLUCOMTR-MCNC: 154 MG/DL — HIGH (ref 70–99)
GLUCOSE BLDC GLUCOMTR-MCNC: 205 MG/DL — HIGH (ref 70–99)
GLUCOSE BLDC GLUCOMTR-MCNC: 234 MG/DL — HIGH (ref 70–99)
GLUCOSE SERPL-MCNC: 259 MG/DL — HIGH (ref 70–99)
HCT VFR BLD CALC: 34.2 % — LOW (ref 39–50)
HGB BLD-MCNC: 11.3 G/DL — LOW (ref 13–17)
MAGNESIUM SERPL-MCNC: 1.7 MG/DL — SIGNIFICANT CHANGE UP (ref 1.6–2.6)
MCHC RBC-ENTMCNC: 30.4 PG — SIGNIFICANT CHANGE UP (ref 27–34)
MCHC RBC-ENTMCNC: 33 GM/DL — SIGNIFICANT CHANGE UP (ref 32–36)
MCV RBC AUTO: 91.9 FL — SIGNIFICANT CHANGE UP (ref 80–100)
NRBC # BLD: 0 /100 WBCS — SIGNIFICANT CHANGE UP (ref 0–0)
PHOSPHATE SERPL-MCNC: 3.1 MG/DL — SIGNIFICANT CHANGE UP (ref 2.5–4.5)
PLATELET # BLD AUTO: 170 K/UL — SIGNIFICANT CHANGE UP (ref 150–400)
POTASSIUM SERPL-MCNC: 4.1 MMOL/L — SIGNIFICANT CHANGE UP (ref 3.5–5.3)
POTASSIUM SERPL-SCNC: 4.1 MMOL/L — SIGNIFICANT CHANGE UP (ref 3.5–5.3)
RBC # BLD: 3.72 M/UL — LOW (ref 4.2–5.8)
RBC # FLD: 12.6 % — SIGNIFICANT CHANGE UP (ref 10.3–14.5)
SODIUM SERPL-SCNC: 138 MMOL/L — SIGNIFICANT CHANGE UP (ref 135–145)
WBC # BLD: 7.98 K/UL — SIGNIFICANT CHANGE UP (ref 3.8–10.5)
WBC # FLD AUTO: 7.98 K/UL — SIGNIFICANT CHANGE UP (ref 3.8–10.5)

## 2021-10-29 PROCEDURE — 76937 US GUIDE VASCULAR ACCESS: CPT | Mod: 26

## 2021-10-29 PROCEDURE — 36246 INS CATH ABD/L-EXT ART 2ND: CPT | Mod: RT,59,58

## 2021-10-29 PROCEDURE — 37234: CPT | Mod: GC,RT

## 2021-10-29 PROCEDURE — 37230: CPT | Mod: GC,RT,58

## 2021-10-29 PROCEDURE — 75726 ARTERY X-RAYS ABDOMEN: CPT | Mod: 26

## 2021-10-29 PROCEDURE — 75710 ARTERY X-RAYS ARM/LEG: CPT | Mod: 26,59

## 2021-10-29 RX ORDER — SODIUM CHLORIDE 9 MG/ML
1000 INJECTION INTRAMUSCULAR; INTRAVENOUS; SUBCUTANEOUS
Refills: 0 | Status: DISCONTINUED | OUTPATIENT
Start: 2021-10-29 | End: 2021-10-29

## 2021-10-29 RX ORDER — ACETAMINOPHEN 500 MG
975 TABLET ORAL ONCE
Refills: 0 | Status: COMPLETED | OUTPATIENT
Start: 2021-10-29 | End: 2021-10-29

## 2021-10-29 RX ORDER — MAGNESIUM SULFATE 500 MG/ML
2 VIAL (ML) INJECTION ONCE
Refills: 0 | Status: COMPLETED | OUTPATIENT
Start: 2021-10-29 | End: 2021-10-29

## 2021-10-29 RX ORDER — INSULIN GLARGINE 100 [IU]/ML
38 INJECTION, SOLUTION SUBCUTANEOUS AT BEDTIME
Refills: 0 | Status: DISCONTINUED | OUTPATIENT
Start: 2021-10-29 | End: 2021-10-30

## 2021-10-29 RX ORDER — VANCOMYCIN HCL 1 G
1250 VIAL (EA) INTRAVENOUS ONCE
Refills: 0 | Status: COMPLETED | OUTPATIENT
Start: 2021-10-29 | End: 2021-10-29

## 2021-10-29 RX ADMIN — Medication 166.67 MILLIGRAM(S): at 17:44

## 2021-10-29 RX ADMIN — PIPERACILLIN AND TAZOBACTAM 200 GRAM(S): 4; .5 INJECTION, POWDER, LYOPHILIZED, FOR SOLUTION INTRAVENOUS at 17:01

## 2021-10-29 RX ADMIN — Medication 166.67 MILLIGRAM(S): at 07:03

## 2021-10-29 RX ADMIN — Medication 4: at 07:03

## 2021-10-29 RX ADMIN — PIPERACILLIN AND TAZOBACTAM 200 GRAM(S): 4; .5 INJECTION, POWDER, LYOPHILIZED, FOR SOLUTION INTRAVENOUS at 23:00

## 2021-10-29 RX ADMIN — Medication 975 MILLIGRAM(S): at 21:45

## 2021-10-29 RX ADMIN — INSULIN GLARGINE 38 UNIT(S): 100 INJECTION, SOLUTION SUBCUTANEOUS at 21:10

## 2021-10-29 RX ADMIN — HEPARIN SODIUM 5000 UNIT(S): 5000 INJECTION INTRAVENOUS; SUBCUTANEOUS at 17:14

## 2021-10-29 RX ADMIN — Medication 2: at 17:22

## 2021-10-29 RX ADMIN — Medication 81 MILLIGRAM(S): at 20:50

## 2021-10-29 RX ADMIN — ATORVASTATIN CALCIUM 40 MILLIGRAM(S): 80 TABLET, FILM COATED ORAL at 21:10

## 2021-10-29 RX ADMIN — Medication 25 GRAM(S): at 18:44

## 2021-10-29 RX ADMIN — CLOPIDOGREL BISULFATE 75 MILLIGRAM(S): 75 TABLET, FILM COATED ORAL at 20:50

## 2021-10-29 RX ADMIN — HEPARIN SODIUM 5000 UNIT(S): 5000 INJECTION INTRAVENOUS; SUBCUTANEOUS at 07:04

## 2021-10-29 RX ADMIN — PIPERACILLIN AND TAZOBACTAM 200 GRAM(S): 4; .5 INJECTION, POWDER, LYOPHILIZED, FOR SOLUTION INTRAVENOUS at 04:12

## 2021-10-29 RX ADMIN — Medication 975 MILLIGRAM(S): at 21:09

## 2021-10-29 NOTE — BRIEF OPERATIVE NOTE - NSICDXBRIEFPROCEDURE_GEN_ALL_CORE_FT
PROCEDURES:  Angiogram, extremity, right 29-Oct-2021 11:45:35  Annalisa Nguyễn  
PROCEDURES:  Ray amputation of toe 31-Oct-2021 21:33:36  Sally Estrada

## 2021-10-29 NOTE — PROGRESS NOTE ADULT - SUBJECTIVE AND OBJECTIVE BOX
O/N: JIM, VSS, consented trough 13 received vanc                  --------------------------------------------------------------------------  PLEASE CHECK WHEN PRESENT:     [  ]Heart Failure     [  ] Acute     [  ] Acute on Chronic     [  ] Chronic  -------------------------------------------------------------------     [  ]Diastolic [HFpEF]     [  ]Systolic [HFrEF]     [  ]Combined [HFpEF & HFrEF]  .................................................................................     [  ]Other:     [ ] Pulmonary Hypertension     [ ] Afib     [x ] Hypertensive Heart Disease  -------------------------------------------------------------------  [ ] Respiratory failure  [ ] Acute cor pulmonale  [ ] Asthma/COPD Exacerbation  [ ] Pleural effusion  [ ] Aspiration pneumonia  [ ] Obstructive Sleep Apnea  -------------------------------------------------------------------  [  ]ROSANNE     [  ]ATN     [  ]Reneal Medullary Necrosis     [  ]Renal Cortical Necrosis     [  ]Other Pathological Lesions:    [  ]CKD 1  [  ]CKD 2  [  ]CKD 3  [  ]CKD 4  [  ]CKD 5  [  ]Other  -------------------------------------------------------------------  [ x ]Diabetes  [  ] Diabetic PVD Ulcer  [ x ] Neuropathic ulcer to DM  [  ] Diabetes with Nephropathy  [  ] Osteomyelitis due to diabetes  [  ] Hyperglycemia   [  ]hypoglycemia   --------------------------------------------------------------------  [  ]Malnutrition: See Nutrition Note  [  ]Cachexia  [  ]Other:   [  ]Supplement Ordered:  [  ]Morbid Obesity (BMI >=40]  ---------------------------------------------------------------------  [ ] Sepsis/severe sepsis/septic shock  [ ] Noninfectious SIRS  [ ] UTI  [ ] Pneumonia  -----------------------------------------------------------------------  [ ] Acidosis/alkalosis  [ ] Fluid overload  [ ] Hypokalemia  [ ] Hyperkalemia  [ ] Hypomagnesemia  [ ] Hypophosphatemia  [ ] Hyperphosphatemia  ------------------------------------------------------------------------  [ ] Acute blood loss anemia  [ ] Post op blood loss anemia  [ ] Iron deficiency anemia  [ ] Anemia due to chronic disease  [ ] Hypercoagulable state  [ ] Thrombocytopenia  ----------------------------------------------------------------------  [ ] Cerebral infarction  [ ] Transient ischemia attack  [ ] Encephalopathy - Toxic or Metabolic      47 y/o M pt with PMHx of DM, CAD, HTN, HLD  s/p amputation of R 3rd toe for gangrene  9/28/21, admitted w infection to amputation site.    Vascular/PAD:  -right 3rd toe amputation site with infection   - daily dressing changes  -Continue ASA+ plavix   -OR today for toe debridement    HLD:  -c/w Atorvastatin 40    HTN:   - c/w lisinopril      CAD/CHF:   -Hx of CAD s/p stents at OSH   -Continue ASA  + Plavix and statin     DM:  -ISS   -Lantus at bedtime      ID:  -OR for infcted toe debridement  -Continue Vanc+ zosyn for now       DVT PPx: SQH        O/N: JIM, VSS, consented trough 13 received vanc      24 hr events    Subjective:    Pain:   Nausea: [ ] YES [ ] NO            Vomiting: [ ] YES [ ] NO  Abd Pain: [ ] YES [ ] NO  Diarrhea: [ ] YES [ ] NO         Constipation: [ ] YES [ ] NO     Chest Pain: [ ] YES [ ] NO    SOB:  [ ] YES [ ] NO  Flatus: [ ] YES [ ] NO  BM: [ ] YES [ ] NO  Voiding: [ ] YES [ ] NO  Weakness: [ ] YES [ ] NO  Numbness: [ ] YES [ ] NO  Extremity coldness: [ ] YES [ ] NO  Claudication: [ ] YES [ ] NO  Extremity Swelling: [ ] YES [ ] NO  Ulcers: [ ] YES [ ] NO        Vital Signs Last 24 Hrs  T(C): 36.8 (29 Oct 2021 06:04), Max: 37 (28 Oct 2021 09:05)  T(F): 98.2 (29 Oct 2021 06:04), Max: 98.6 (28 Oct 2021 09:05)  HR: 74 (29 Oct 2021 06:02) (68 - 84)  BP: 133/63 (29 Oct 2021 06:02) (111/64 - 139/78)  BP(mean): 102 (28 Oct 2021 17:19) (83 - 102)  RR: 18 (29 Oct 2021 06:02) (12 - 24)  SpO2: 95% (29 Oct 2021 06:02) (93% - 97%)    I&O's Summary    28 Oct 2021 07:01  -  29 Oct 2021 07:00  --------------------------------------------------------  IN: 340 mL / OUT: 1000 mL / NET: -660 mL        Physical Exam:  Constitutional: Pt AXOX3 in NAD  Respiratory: Non labored  Cardiovascular: S1S2  Extremities: R third toe amp site with fibrinous tissue, with mild surrounding erythema/ mild drainage, no LE edema, calfs soft and non tender  Vascular: R Tri DP/PT L palp DP. dressing changed  Lines/drains/tubes:    LABS:                        11.3   7.98  )-----------( 170      ( 29 Oct 2021 06:07 )             34.2     10-29    138  |  104  |  11  ----------------------------<  259<H>  4.1   |  26  |  0.66    Ca    9.1      29 Oct 2021 06:07  Phos  3.1     10-29  Mg     1.7     10-29    TPro  7.6  /  Alb  4.6  /  TBili  0.7  /  DBili  x   /  AST  19  /  ALT  25  /  AlkPhos  59  10-27    PT/INR - ( 27 Oct 2021 20:06 )   PT: 11.6 sec;   INR: 0.97          PTT - ( 27 Oct 2021 20:06 )  PTT:28.5 sec    LIVER FUNCTIONS - ( 27 Oct 2021 20:06 )  Alb: 4.6 g/dL / Pro: 7.6 g/dL / ALK PHOS: 59 U/L / ALT: 25 U/L / AST: 19 U/L / GGT: x           CAPILLARY BLOOD GLUCOSE      POCT Blood Glucose.: 234 mg/dL (29 Oct 2021 06:46)  POCT Blood Glucose.: 196 mg/dL (28 Oct 2021 22:28)  POCT Blood Glucose.: 105 mg/dL (28 Oct 2021 16:24)  POCT Blood Glucose.: 175 mg/dL (28 Oct 2021 11:55)  POCT Blood Glucose.: 207 mg/dL (28 Oct 2021 08:23)      RADIOLOGY & ADDITIONAL TESTS:              --------------------------------------------------------------------------  PLEASE CHECK WHEN PRESENT:     [  ]Heart Failure     [  ] Acute     [  ] Acute on Chronic     [  ] Chronic  -------------------------------------------------------------------     [  ]Diastolic [HFpEF]     [  ]Systolic [HFrEF]     [  ]Combined [HFpEF & HFrEF]  .................................................................................     [  ]Other:     [ ] Pulmonary Hypertension     [ ] Afib     [x ] Hypertensive Heart Disease  -------------------------------------------------------------------  [ ] Respiratory failure  [ ] Acute cor pulmonale  [ ] Asthma/COPD Exacerbation  [ ] Pleural effusion  [ ] Aspiration pneumonia  [ ] Obstructive Sleep Apnea  -------------------------------------------------------------------  [  ]ROSANNE     [  ]ATN     [  ]Reneal Medullary Necrosis     [  ]Renal Cortical Necrosis     [  ]Other Pathological Lesions:    [  ]CKD 1  [  ]CKD 2  [  ]CKD 3  [  ]CKD 4  [  ]CKD 5  [  ]Other  -------------------------------------------------------------------  [ x ]Diabetes  [  ] Diabetic PVD Ulcer  [ x ] Neuropathic ulcer to DM  [  ] Diabetes with Nephropathy  [  ] Osteomyelitis due to diabetes  [  ] Hyperglycemia   [  ]hypoglycemia   --------------------------------------------------------------------  [  ]Malnutrition: See Nutrition Note  [  ]Cachexia  [  ]Other:   [  ]Supplement Ordered:  [  ]Morbid Obesity (BMI >=40]  ---------------------------------------------------------------------  [ ] Sepsis/severe sepsis/septic shock  [ ] Noninfectious SIRS  [ ] UTI  [ ] Pneumonia  -----------------------------------------------------------------------  [ ] Acidosis/alkalosis  [ ] Fluid overload  [ ] Hypokalemia  [ ] Hyperkalemia  [ ] Hypomagnesemia  [ ] Hypophosphatemia  [ ] Hyperphosphatemia  ------------------------------------------------------------------------  [ ] Acute blood loss anemia  [ ] Post op blood loss anemia  [ ] Iron deficiency anemia  [ ] Anemia due to chronic disease  [ ] Hypercoagulable state  [ ] Thrombocytopenia  ----------------------------------------------------------------------  [ ] Cerebral infarction  [ ] Transient ischemia attack  [ ] Encephalopathy - Toxic or Metabolic         O/N: JIM, VSS, consented trough 13 received vanc    Subjective: patient with no acute complaints    Vital Signs Last 24 Hrs  T(C): 36.8 (29 Oct 2021 06:04), Max: 37 (28 Oct 2021 09:05)  T(F): 98.2 (29 Oct 2021 06:04), Max: 98.6 (28 Oct 2021 09:05)  HR: 74 (29 Oct 2021 06:02) (68 - 84)  BP: 133/63 (29 Oct 2021 06:02) (111/64 - 139/78)  BP(mean): 102 (28 Oct 2021 17:19) (83 - 102)  RR: 18 (29 Oct 2021 06:02) (12 - 24)  SpO2: 95% (29 Oct 2021 06:02) (93% - 97%)    I&O's Summary    28 Oct 2021 07:01  -  29 Oct 2021 07:00  --------------------------------------------------------  IN: 340 mL / OUT: 1000 mL / NET: -660 mL    Physical Exam:  Constitutional: Pt AXOX3 in NAD  Respiratory: Non labored  Cardiovascular: S1S2  Extremities: R third toe amp site with fibrinous tissue, with mild surrounding erythema/ mild drainage, no LE edema, calfs soft and non tender  Vascular: R Tri DP/PT L palp DP. dressing changed  Lines/drains/tubes:    LABS:                        11.3   7.98  )-----------( 170      ( 29 Oct 2021 06:07 )             34.2     10-29    138  |  104  |  11  ----------------------------<  259<H>  4.1   |  26  |  0.66    Ca    9.1      29 Oct 2021 06:07  Phos  3.1     10-29  Mg     1.7     10-29    TPro  7.6  /  Alb  4.6  /  TBili  0.7  /  DBili  x   /  AST  19  /  ALT  25  /  AlkPhos  59  10-27    PT/INR - ( 27 Oct 2021 20:06 )   PT: 11.6 sec;   INR: 0.97          PTT - ( 27 Oct 2021 20:06 )  PTT:28.5 sec    LIVER FUNCTIONS - ( 27 Oct 2021 20:06 )  Alb: 4.6 g/dL / Pro: 7.6 g/dL / ALK PHOS: 59 U/L / ALT: 25 U/L / AST: 19 U/L / GGT: x           CAPILLARY BLOOD GLUCOSE      POCT Blood Glucose.: 234 mg/dL (29 Oct 2021 06:46)  POCT Blood Glucose.: 196 mg/dL (28 Oct 2021 22:28)  POCT Blood Glucose.: 105 mg/dL (28 Oct 2021 16:24)  POCT Blood Glucose.: 175 mg/dL (28 Oct 2021 11:55)  POCT Blood Glucose.: 207 mg/dL (28 Oct 2021 08:23)    --------------------------------------------------------------------------  PLEASE CHECK WHEN PRESENT:     [  ]Heart Failure     [  ] Acute     [  ] Acute on Chronic     [  ] Chronic  -------------------------------------------------------------------     [  ]Diastolic [HFpEF]     [  ]Systolic [HFrEF]     [  ]Combined [HFpEF & HFrEF]  .................................................................................     [  ]Other:     [ ] Pulmonary Hypertension     [ ] Afib     [x ] Hypertensive Heart Disease  -------------------------------------------------------------------  [ ] Respiratory failure  [ ] Acute cor pulmonale  [ ] Asthma/COPD Exacerbation  [ ] Pleural effusion  [ ] Aspiration pneumonia  [ ] Obstructive Sleep Apnea  -------------------------------------------------------------------  [  ]ROSANNE     [  ]ATN     [  ]Reneal Medullary Necrosis     [  ]Renal Cortical Necrosis     [  ]Other Pathological Lesions:    [  ]CKD 1  [  ]CKD 2  [  ]CKD 3  [  ]CKD 4  [  ]CKD 5  [  ]Other  -------------------------------------------------------------------  [ x ]Diabetes  [  ] Diabetic PVD Ulcer  [ x ] Neuropathic ulcer to DM  [  ] Diabetes with Nephropathy  [  ] Osteomyelitis due to diabetes  [  ] Hyperglycemia   [  ]hypoglycemia   --------------------------------------------------------------------  [  ]Malnutrition: See Nutrition Note  [  ]Cachexia  [  ]Other:   [  ]Supplement Ordered:  [  ]Morbid Obesity (BMI >=40]  ---------------------------------------------------------------------  [ ] Sepsis/severe sepsis/septic shock  [ ] Noninfectious SIRS  [ ] UTI  [ ] Pneumonia  -----------------------------------------------------------------------  [ ] Acidosis/alkalosis  [ ] Fluid overload  [ ] Hypokalemia  [ ] Hyperkalemia  [ ] Hypomagnesemia  [ ] Hypophosphatemia  [ ] Hyperphosphatemia  ------------------------------------------------------------------------  [ ] Acute blood loss anemia  [ ] Post op blood loss anemia  [ ] Iron deficiency anemia  [ ] Anemia due to chronic disease  [ ] Hypercoagulable state  [ ] Thrombocytopenia  ----------------------------------------------------------------------  [ ] Cerebral infarction  [ ] Transient ischemia attack  [ ] Encephalopathy - Toxic or Metabolic

## 2021-10-29 NOTE — PROGRESS NOTE ADULT - ASSESSMENT
45 y/o M pt with PMHx of DM, CAD, HTN, HLD  s/p amputation of R 3rd toe for gangrene  9/28/21, admitted w infection to amputation site.    Vascular/PAD:  -right 3rd toe amputation site with infection   - daily dressing changes  -Continue ASA+ plavix   -OR today for toe debridement    HLD:  -c/w Atorvastatin 40    HTN:   - c/w lisinopril      CAD/CHF:   -Hx of CAD s/p stents at OSH   -Continue ASA  + Plavix and statin     DM:  -ISS   -Lantus at bedtime      ID:  -OR for infected toe debridement  -Continue Vanc+ zosyn for now       DVT PPx: SQH      45 y/o M pt with PMHx of DM, CAD, HTN, HLD  s/p amputation of R 3rd toe for gangrene  9/28/21, admitted w infection to amputation site.    Vascular/PAD:  -right 3rd toe amputation site with infection   - daily dressing changes  -Continue ASA+ plavix   -OR today for toe debridement    HLD:  -c/w Atorvastatin 40    HTN:   - hlding lisinopril pre -op      CAD/CHF:   -Hx of CAD s/p stents at OSH   -Continue ASA  + Plavix and statin     DM:  -ISS   -Lantus at bedtime      ID:  -OR for infected toe debridement  -Continue Vanc+ zosyn for now   - vanc trough      DVT PPx: SQH

## 2021-10-29 NOTE — BRIEF OPERATIVE NOTE - NSICDXBRIEFPREOP_GEN_ALL_CORE_FT
PRE-OP DIAGNOSIS:  PAD (peripheral artery disease) 29-Oct-2021 11:45:44  Annalisa Nguyễn  
PRE-OP DIAGNOSIS:  PAD (peripheral artery disease) 29-Oct-2021 11:45:44  Annalisa Nguyễn

## 2021-10-29 NOTE — PROGRESS NOTE ADULT - SUBJECTIVE AND OBJECTIVE BOX
Vascular Surgery Post-Op Note, PCN:     Pre-Op Dx: SURGICAL SITE INFECTION    PAD (peripheral artery disease)    Surgical site infection    Procedure:  RLE angiogram, R AT and PT stents  Surgeon: Dr. Cary    Subjective: Patient doing well, no acute complaints. Pain is well controlled    Vital Signs Last 24 Hrs  T(C): 36.8 (29 Oct 2021 06:04), Max: 36.8 (29 Oct 2021 06:04)  T(F): 98.2 (29 Oct 2021 06:04), Max: 98.2 (29 Oct 2021 06:04)  HR: 74 (29 Oct 2021 08:40) (68 - 84)  BP: 134/72 (29 Oct 2021 08:40) (111/64 - 135/70)  BP(mean): 102 (28 Oct 2021 17:19) (83 - 102)  RR: 18 (29 Oct 2021 08:40) (12 - 24)  SpO2: 96% (29 Oct 2021 08:40) (93% - 97%)    Physical Exam:  General: NAD, resting comfortably in bed  Pulmonary: Nonlabored breathing, no respiratory distress  Cardiovascular: NSR  Abdominal: soft, NT/ND  Extremities: WWP, normal strength. right groin soft, no hematoma after sheath pull  Neuro: A/O x 3, CNs II-XII grossly intact, normal motor/sensation, no focal deficits  Pulses:   Right:  DP [ ]2+ [ ]1+ [x ]doppler triphasic   PT[ ]2+ [ ]1+ [x ]doppler triphasic    Left:  DP [x]2+ [ ]1+ [ ]doppler  PT [x ]2+ [ ]1+ [ ]doppler    LABS:                        11.3   7.98  )-----------( 170      ( 29 Oct 2021 06:07 )             34.2     10-29    138  |  104  |  11  ----------------------------<  259<H>  4.1   |  26  |  0.66    Ca    9.1      29 Oct 2021 06:07  Phos  3.1     10-29  Mg     1.7     10-29    TPro  7.6  /  Alb  4.6  /  TBili  0.7  /  DBili  x   /  AST  19  /  ALT  25  /  AlkPhos  59  10-27    PT/INR - ( 27 Oct 2021 20:06 )   PT: 11.6 sec;   INR: 0.97          PTT - ( 27 Oct 2021 20:06 )  PTT:28.5 sec  CAPILLARY BLOOD GLUCOSE      POCT Blood Glucose.: 205 mg/dL (29 Oct 2021 12:04)  POCT Blood Glucose.: 234 mg/dL (29 Oct 2021 06:46)  POCT Blood Glucose.: 196 mg/dL (28 Oct 2021 22:28)  POCT Blood Glucose.: 105 mg/dL (28 Oct 2021 16:24)    LIVER FUNCTIONS - ( 27 Oct 2021 20:06 )  Alb: 4.6 g/dL / Pro: 7.6 g/dL / ALK PHOS: 59 U/L / ALT: 25 U/L / AST: 19 U/L / GGT: x             Radiology and Additional Studies:    Assessment:46y Male s/p above procedure    Plan:  Pain/nausea control PRN  Home meds  Incentive spirometer/OOB/Ambulate  Vitals per protocol  IVF, Diet: reg   AM labs  cont antibiotics

## 2021-10-29 NOTE — BRIEF OPERATIVE NOTE - OPERATION/FINDINGS
Procedure: RLE angiogram, R AT and PT stents  Indication: Non-healing R 3rd toe amputation site  Description: L CFA access with 5Fr micropuncture. Aortogram performed. Patent aorta, b/l iliacs, R CFA, PFA, and SFA. Pop patent, 3 vessel runoff to the foot with stenotic lesions noted in the AT and PT. 2.5x26 resolute maury stent placed in the AT with resolution of stenosis. two [id number] resolute maury stents placed in PT with resolution of stenosis. Incomplete pedal arch noted as dp was occluded.  Contrast: 150cc  Max Sheath: 5Fr  Pulses: Triphasic pt/dp
Ray amputation of right third toe with bone sent for culture. Hemostasis achieved.

## 2021-10-30 LAB
-  CEFAZOLIN: SIGNIFICANT CHANGE UP
-  CLINDAMYCIN: SIGNIFICANT CHANGE UP
-  ERYTHROMYCIN: SIGNIFICANT CHANGE UP
-  LINEZOLID: SIGNIFICANT CHANGE UP
-  OXACILLIN: SIGNIFICANT CHANGE UP
-  RIFAMPIN: SIGNIFICANT CHANGE UP
-  TRIMETHOPRIM/SULFAMETHOXAZOLE: SIGNIFICANT CHANGE UP
-  VANCOMYCIN: SIGNIFICANT CHANGE UP
ANION GAP SERPL CALC-SCNC: 8 MMOL/L — SIGNIFICANT CHANGE UP (ref 5–17)
BUN SERPL-MCNC: 9 MG/DL — SIGNIFICANT CHANGE UP (ref 7–23)
CALCIUM SERPL-MCNC: 8.8 MG/DL — SIGNIFICANT CHANGE UP (ref 8.4–10.5)
CHLORIDE SERPL-SCNC: 104 MMOL/L — SIGNIFICANT CHANGE UP (ref 96–108)
CO2 SERPL-SCNC: 27 MMOL/L — SIGNIFICANT CHANGE UP (ref 22–31)
CREAT SERPL-MCNC: 0.7 MG/DL — SIGNIFICANT CHANGE UP (ref 0.5–1.3)
GLUCOSE BLDC GLUCOMTR-MCNC: 142 MG/DL — HIGH (ref 70–99)
GLUCOSE BLDC GLUCOMTR-MCNC: 177 MG/DL — HIGH (ref 70–99)
GLUCOSE BLDC GLUCOMTR-MCNC: 182 MG/DL — HIGH (ref 70–99)
GLUCOSE BLDC GLUCOMTR-MCNC: 200 MG/DL — HIGH (ref 70–99)
GLUCOSE SERPL-MCNC: 197 MG/DL — HIGH (ref 70–99)
HCT VFR BLD CALC: 33.1 % — LOW (ref 39–50)
HGB BLD-MCNC: 11.3 G/DL — LOW (ref 13–17)
MAGNESIUM SERPL-MCNC: 2.1 MG/DL — SIGNIFICANT CHANGE UP (ref 1.6–2.6)
MCHC RBC-ENTMCNC: 31.6 PG — SIGNIFICANT CHANGE UP (ref 27–34)
MCHC RBC-ENTMCNC: 34.1 GM/DL — SIGNIFICANT CHANGE UP (ref 32–36)
MCV RBC AUTO: 92.5 FL — SIGNIFICANT CHANGE UP (ref 80–100)
METHOD TYPE: SIGNIFICANT CHANGE UP
METHOD TYPE: SIGNIFICANT CHANGE UP
NRBC # BLD: 0 /100 WBCS — SIGNIFICANT CHANGE UP (ref 0–0)
PHOSPHATE SERPL-MCNC: 2.7 MG/DL — SIGNIFICANT CHANGE UP (ref 2.5–4.5)
PLATELET # BLD AUTO: 176 K/UL — SIGNIFICANT CHANGE UP (ref 150–400)
POTASSIUM SERPL-MCNC: 3.9 MMOL/L — SIGNIFICANT CHANGE UP (ref 3.5–5.3)
POTASSIUM SERPL-SCNC: 3.9 MMOL/L — SIGNIFICANT CHANGE UP (ref 3.5–5.3)
RBC # BLD: 3.58 M/UL — LOW (ref 4.2–5.8)
RBC # FLD: 12.6 % — SIGNIFICANT CHANGE UP (ref 10.3–14.5)
SODIUM SERPL-SCNC: 139 MMOL/L — SIGNIFICANT CHANGE UP (ref 135–145)
VANCOMYCIN TROUGH SERPL-MCNC: 11.4 UG/ML — SIGNIFICANT CHANGE UP (ref 10–20)
WBC # BLD: 6.71 K/UL — SIGNIFICANT CHANGE UP (ref 3.8–10.5)
WBC # FLD AUTO: 6.71 K/UL — SIGNIFICANT CHANGE UP (ref 3.8–10.5)

## 2021-10-30 PROCEDURE — 99233 SBSQ HOSP IP/OBS HIGH 50: CPT

## 2021-10-30 RX ORDER — LISINOPRIL 2.5 MG/1
20 TABLET ORAL DAILY
Refills: 0 | Status: DISCONTINUED | OUTPATIENT
Start: 2021-10-30 | End: 2021-11-01

## 2021-10-30 RX ORDER — VANCOMYCIN HCL 1 G
1250 VIAL (EA) INTRAVENOUS EVERY 8 HOURS
Refills: 0 | Status: DISCONTINUED | OUTPATIENT
Start: 2021-10-31 | End: 2021-10-31

## 2021-10-30 RX ORDER — INSULIN GLARGINE 100 [IU]/ML
44 INJECTION, SOLUTION SUBCUTANEOUS AT BEDTIME
Refills: 0 | Status: DISCONTINUED | OUTPATIENT
Start: 2021-10-30 | End: 2021-11-01

## 2021-10-30 RX ORDER — VANCOMYCIN HCL 1 G
1250 VIAL (EA) INTRAVENOUS ONCE
Refills: 0 | Status: COMPLETED | OUTPATIENT
Start: 2021-10-30 | End: 2021-10-30

## 2021-10-30 RX ORDER — VANCOMYCIN HCL 1 G
VIAL (EA) INTRAVENOUS
Refills: 0 | Status: DISCONTINUED | OUTPATIENT
Start: 2021-10-30 | End: 2021-10-30

## 2021-10-30 RX ORDER — POTASSIUM PHOSPHATE, MONOBASIC POTASSIUM PHOSPHATE, DIBASIC 236; 224 MG/ML; MG/ML
15 INJECTION, SOLUTION INTRAVENOUS ONCE
Refills: 0 | Status: COMPLETED | OUTPATIENT
Start: 2021-10-30 | End: 2021-10-30

## 2021-10-30 RX ORDER — PIPERACILLIN AND TAZOBACTAM 4; .5 G/20ML; G/20ML
3.38 INJECTION, POWDER, LYOPHILIZED, FOR SOLUTION INTRAVENOUS EVERY 6 HOURS
Refills: 0 | Status: DISCONTINUED | OUTPATIENT
Start: 2021-10-30 | End: 2021-11-01

## 2021-10-30 RX ORDER — VANCOMYCIN HCL 1 G
1250 VIAL (EA) INTRAVENOUS EVERY 8 HOURS
Refills: 0 | Status: DISCONTINUED | OUTPATIENT
Start: 2021-10-30 | End: 2021-10-30

## 2021-10-30 RX ADMIN — HEPARIN SODIUM 5000 UNIT(S): 5000 INJECTION INTRAVENOUS; SUBCUTANEOUS at 00:57

## 2021-10-30 RX ADMIN — PIPERACILLIN AND TAZOBACTAM 200 GRAM(S): 4; .5 INJECTION, POWDER, LYOPHILIZED, FOR SOLUTION INTRAVENOUS at 12:29

## 2021-10-30 RX ADMIN — POTASSIUM PHOSPHATE, MONOBASIC POTASSIUM PHOSPHATE, DIBASIC 62.5 MILLIMOLE(S): 236; 224 INJECTION, SOLUTION INTRAVENOUS at 22:09

## 2021-10-30 RX ADMIN — Medication 166.67 MILLIGRAM(S): at 20:53

## 2021-10-30 RX ADMIN — Medication 166.67 MILLIGRAM(S): at 09:22

## 2021-10-30 RX ADMIN — CLOPIDOGREL BISULFATE 75 MILLIGRAM(S): 75 TABLET, FILM COATED ORAL at 09:23

## 2021-10-30 RX ADMIN — Medication 8 UNIT(S): at 07:55

## 2021-10-30 RX ADMIN — Medication 8 UNIT(S): at 12:30

## 2021-10-30 RX ADMIN — Medication 166.67 MILLIGRAM(S): at 02:41

## 2021-10-30 RX ADMIN — ATORVASTATIN CALCIUM 40 MILLIGRAM(S): 80 TABLET, FILM COATED ORAL at 22:09

## 2021-10-30 RX ADMIN — Medication 8 UNIT(S): at 17:23

## 2021-10-30 RX ADMIN — Medication 81 MILLIGRAM(S): at 09:23

## 2021-10-30 RX ADMIN — Medication 2: at 07:55

## 2021-10-30 RX ADMIN — Medication 2: at 17:22

## 2021-10-30 RX ADMIN — PIPERACILLIN AND TAZOBACTAM 200 GRAM(S): 4; .5 INJECTION, POWDER, LYOPHILIZED, FOR SOLUTION INTRAVENOUS at 17:23

## 2021-10-30 RX ADMIN — PIPERACILLIN AND TAZOBACTAM 200 GRAM(S): 4; .5 INJECTION, POWDER, LYOPHILIZED, FOR SOLUTION INTRAVENOUS at 23:41

## 2021-10-30 RX ADMIN — HEPARIN SODIUM 5000 UNIT(S): 5000 INJECTION INTRAVENOUS; SUBCUTANEOUS at 17:24

## 2021-10-30 RX ADMIN — PIPERACILLIN AND TAZOBACTAM 200 GRAM(S): 4; .5 INJECTION, POWDER, LYOPHILIZED, FOR SOLUTION INTRAVENOUS at 05:21

## 2021-10-30 RX ADMIN — INSULIN GLARGINE 44 UNIT(S): 100 INJECTION, SOLUTION SUBCUTANEOUS at 22:08

## 2021-10-30 RX ADMIN — LISINOPRIL 20 MILLIGRAM(S): 2.5 TABLET ORAL at 12:29

## 2021-10-30 RX ADMIN — Medication 2: at 12:29

## 2021-10-30 NOTE — PROGRESS NOTE ADULT - SUBJECTIVE AND OBJECTIVE BOX
Subjective/Interval events:  No acute overnight events. Had angiogram yesterday, stents placed. Doing well this morning, no fever/chills, tolerating diet. Had some bleeding at surgical site but is controlled now.    MEDICATIONS  (STANDING):  aspirin enteric coated 81 milliGRAM(s) Oral daily  atorvastatin 40 milliGRAM(s) Oral at bedtime  clopidogrel Tablet 75 milliGRAM(s) Oral daily  dextrose 40% Gel 15 Gram(s) Oral once  dextrose 5%. 1000 milliLiter(s) (50 mL/Hr) IV Continuous <Continuous>  dextrose 5%. 1000 milliLiter(s) (100 mL/Hr) IV Continuous <Continuous>  dextrose 50% Injectable 25 Gram(s) IV Push once  dextrose 50% Injectable 12.5 Gram(s) IV Push once  dextrose 50% Injectable 25 Gram(s) IV Push once  glucagon  Injectable 1 milliGRAM(s) IntraMuscular once  heparin   Injectable 5000 Unit(s) SubCutaneous every 8 hours  influenza   Vaccine 0.5 milliLiter(s) IntraMuscular once  insulin glargine Injectable (LANTUS) 38 Unit(s) SubCutaneous at bedtime  insulin lispro (ADMELOG) corrective regimen sliding scale   SubCutaneous Before meals and at bedtime  insulin lispro Injectable (ADMELOG) 8 Unit(s) SubCutaneous three times a day before meals  lisinopril 20 milliGRAM(s) Oral daily  piperacillin/tazobactam IVPB.. 3.375 Gram(s) IV Intermittent every 6 hours  sodium chloride 0.9%. 1000 milliLiter(s) (100 mL/Hr) IV Continuous <Continuous>  vancomycin  IVPB 1250 milliGRAM(s) IV Intermittent every 8 hours    MEDICATIONS  (PRN):      Vital Signs Last 24 Hrs  T(C): 36.8 (30 Oct 2021 09:05), Max: 36.8 (30 Oct 2021 05:25)  T(F): 98.3 (30 Oct 2021 09:05), Max: 98.3 (30 Oct 2021 09:05)  HR: 68 (30 Oct 2021 08:24) (66 - 82)  BP: 141/78 (30 Oct 2021 08:24) (130/66 - 148/83)  BP(mean): 104 (30 Oct 2021 08:24) (104 - 104)  RR: 19 (30 Oct 2021 08:24) (18 - 19)  SpO2: 96% (30 Oct 2021 08:24) (95% - 96%)    PHYSICAL EXAM:  GENERAL: pleasant, appropriate, no acute distress. Participating appropriately in interview  HEENT - NC/AT, EOMI, PERLLA, oropharynx clear without exudate or erythema, moist mucus membranes  NECK: Soft, supple, No JVD, no lymphadenopathy  CHEST/LUNG: Clear to auscultation bilaterally; No rales, rhonchi, wheezing. Normal work of breathing, not tachypneic  HEART: Regular rate and rhythm; No murmurs, rubs, or gallops, normal s1/s2. Warm and well-perfused  ABDOMEN: obese, soft, Nontender, Nondistended. Normoactive bowel sounds.  EXTREMITIES:  2+ Peripheral Pulses, No clubbing, cyanosis, or edema  NEURO:  awake, alert, oriented to person, place, time, and situation. Cranial nerves intact, no motor or sensory deficits. Moves all extremities.  SKIN: No rashes or lesions. R foot wrapped in kerlix, no bleeding or other drainage on dressing    LABS:  10-30    139  |  104  |  9   ----------------------------<  197<H>  3.9   |  27  |  0.70    Ca    8.8      30 Oct 2021 08:32  Phos  2.7     10-30  Mg     2.1     10-30                            11.3   6.71  )-----------( 176      ( 30 Oct 2021 08:32 )             33.1     RADIOLOGY & ADDITIONAL TESTS:    reviewed, none new

## 2021-10-30 NOTE — PROGRESS NOTE ADULT - SUBJECTIVE AND OBJECTIVE BOX
O/N: JIM, VSS trough 11 dose given                            [  ]Heart Failure     [  ] Acute     [  ] Acute on Chronic     [  ] Chronic  -------------------------------------------------------------------     [  ]Diastolic [HFpEF]     [  ]Systolic [HFrEF]     [  ]Combined [HFpEF & HFrEF]  .................................................................................     [  ]Other:     [ ] Pulmonary Hypertension     [ ] Afib     [x ] Hypertensive Heart Disease  -------------------------------------------------------------------  [ ] Respiratory failure  [ ] Acute cor pulmonale  [ ] Asthma/COPD Exacerbation  [ ] Pleural effusion  [ ] Aspiration pneumonia  [ ] Obstructive Sleep Apnea  -------------------------------------------------------------------  [  ]ROSANNE     [  ]ATN     [  ]Reneal Medullary Necrosis     [  ]Renal Cortical Necrosis     [  ]Other Pathological Lesions:    [  ]CKD 1  [  ]CKD 2  [  ]CKD 3  [  ]CKD 4  [  ]CKD 5  [  ]Other  -------------------------------------------------------------------  [ x ]Diabetes  [  ] Diabetic PVD Ulcer  [ x ] Neuropathic ulcer to DM  [  ] Diabetes with Nephropathy  [  ] Osteomyelitis due to diabetes  [  ] Hyperglycemia   [  ]hypoglycemia   --------------------------------------------------------------------  [  ]Malnutrition: See Nutrition Note  [  ]Cachexia  [  ]Other:   [  ]Supplement Ordered:  [  ]Morbid Obesity (BMI >=40]  ---------------------------------------------------------------------  [ ] Sepsis/severe sepsis/septic shock  [ ] Noninfectious SIRS  [ ] UTI  [ ] Pneumonia  -----------------------------------------------------------------------  [ ] Acidosis/alkalosis  [ ] Fluid overload  [ ] Hypokalemia  [ ] Hyperkalemia  [ ] Hypomagnesemia  [ ] Hypophosphatemia  [ ] Hyperphosphatemia  ------------------------------------------------------------------------  [ ] Acute blood loss anemia  [ ] Post op blood loss anemia  [ ] Iron deficiency anemia  [ ] Anemia due to chronic disease  [ ] Hypercoagulable state  [ ] Thrombocytopenia  ----------------------------------------------------------------------  [ ] Cerebral infarction  [ ] Transient ischemia attack  [ ] Encephalopathy - Toxic or Metabolic         [  ]Heart Failure     [  ] Acute     [  ] Acute on Chronic     [  ] Chronic  -------------------------------------------------------------------     [  ]Diastolic [HFpEF]     [  ]Systolic [HFrEF]     [  ]Combined [HFpEF & HFrEF]  .................................................................................     [  ]Other:     [ ] Pulmonary Hypertension     [ ] Afib     [x ] Hypertensive Heart Disease  -------------------------------------------------------------------  [ ] Respiratory failure  [ ] Acute cor pulmonale  [ ] Asthma/COPD Exacerbation  [ ] Pleural effusion  [ ] Aspiration pneumonia  [ ] Obstructive Sleep Apnea  -------------------------------------------------------------------  [  ]ROSANNE     [  ]ATN     [  ]Reneal Medullary Necrosis     [  ]Renal Cortical Necrosis     [  ]Other Pathological Lesions:    [  ]CKD 1  [  ]CKD 2  [  ]CKD 3  [  ]CKD 4  [  ]CKD 5  [  ]Other  -------------------------------------------------------------------  [ x ]Diabetes  [  ] Diabetic PVD Ulcer  [ x ] Neuropathic ulcer to DM  [  ] Diabetes with Nephropathy  [  ] Osteomyelitis due to diabetes  [  ] Hyperglycemia   [  ]hypoglycemia   --------------------------------------------------------------------  [  ]Malnutrition: See Nutrition Note  [  ]Cachexia  [  ]Other:   [  ]Supplement Ordered:  [  ]Morbid Obesity (BMI >=40]  ---------------------------------------------------------------------  [ ] Sepsis/severe sepsis/septic shock  [ ] Noninfectious SIRS  [ ] UTI  [ ] Pneumonia  -----------------------------------------------------------------------  [ ] Acidosis/alkalosis  [ ] Fluid overload  [ ] Hypokalemia  [ ] Hyperkalemia  [ ] Hypomagnesemia  [ ] Hypophosphatemia  [ ] Hyperphosphatemia  ------------------------------------------------------------------------  [ ] Acute blood loss anemia  [ ] Post op blood loss anemia  [ ] Iron deficiency anemia  [ ] Anemia due to chronic disease  [ ] Hypercoagulable state  [ ] Thrombocytopenia  ----------------------------------------------------------------------  [ ] Cerebral infarction  [ ] Transient ischemia attack  [ ] Encephalopathy - Toxic or Metabolic          Assessment and Plan:   · Assessment	  45 y/o M pt with PMHx of DM, CAD, HTN, HLD  s/p amputation of R 3rd toe for gangrene  9/28/21, admitted w infection to amputation site.    Vascular/PAD:  -right 3rd toe amputation site with infection   - daily dressing changes  -Continue ASA+ plavix   -OR today for toe debridement    HLD:  -c/w Atorvastatin 40    HTN:   - hlding lisinopril pre -op      CAD/CHF:   -Hx of CAD s/p stents at OSH   -Continue ASA  + Plavix and statin     DM:  -ISS   -Lantus at bedtime      ID:  -OR for infected toe debridement  -Continue Vanc+ zosyn for now   - vanc trough      DVT PPx: SQH       O/N: JIM, VSS trough 11 dose given     Patient seen and examined bedside with chief resident, - F/C/N/V, endorses minimal discomfort in his RLE,     lisinopril 20 milliGRAM(s) Oral daily  piperacillin/tazobactam IVPB.. 3.375 Gram(s) IV Intermittent every 6 hours  vancomycin  IVPB 1250 milliGRAM(s) IV Intermittent every 8 hours      Vital Signs Last 24 Hrs  T(C): 36 (30 Oct 2021 18:23), Max: 36.8 (30 Oct 2021 05:25)  T(F): 96.8 (30 Oct 2021 18:23), Max: 98.3 (30 Oct 2021 09:05)  HR: 68 (30 Oct 2021 16:30) (68 - 80)  BP: 154/76 (30 Oct 2021 16:30) (130/69 - 154/79)  BP(mean): 107 (30 Oct 2021 16:30) (104 - 111)  RR: 19 (30 Oct 2021 16:30) (18 - 19)  SpO2: 96% (30 Oct 2021 16:30) (94% - 96%)    I&O's Detail    29 Oct 2021 07:01  -  30 Oct 2021 07:00  --------------------------------------------------------  IN:    sodium chloride 0.9%: 600 mL  Total IN: 600 mL    OUT:    Oral Fluid: 0 mL    Voided (mL): 1000 mL  Total OUT: 1000 mL    Total NET: -400 mL      30 Oct 2021 07:01  -  30 Oct 2021 19:18  --------------------------------------------------------  IN:    Oral Fluid: 240 mL  Total IN: 240 mL    OUT:    Voided (mL): 650 mL  Total OUT: 650 mL    Total NET: -410 mL        Physical Exam  Constitutional: Pt AXOX3 in NAD  Respiratory: Non labored  Cardiovascular: S1S2  Extremities: R third toe amp site with fibrinous tissue, with mild surrounding erythema/ mild drainage, no LE edema, calfs soft and non tender  Vascular: R Tri DP/PT L palp DP. dressing changed  Lines/drains/tubes:                         [  ]Heart Failure     [  ] Acute     [  ] Acute on Chronic     [  ] Chronic  -------------------------------------------------------------------     [  ]Diastolic [HFpEF]     [  ]Systolic [HFrEF]     [  ]Combined [HFpEF & HFrEF]  .................................................................................     [  ]Other:     [ ] Pulmonary Hypertension     [ ] Afib     [x ] Hypertensive Heart Disease  -------------------------------------------------------------------  [ ] Respiratory failure  [ ] Acute cor pulmonale  [ ] Asthma/COPD Exacerbation  [ ] Pleural effusion  [ ] Aspiration pneumonia  [ ] Obstructive Sleep Apnea  -------------------------------------------------------------------  [  ]ROSANNE     [  ]ATN     [  ]Reneal Medullary Necrosis     [  ]Renal Cortical Necrosis     [  ]Other Pathological Lesions:    [  ]CKD 1  [  ]CKD 2  [  ]CKD 3  [  ]CKD 4  [  ]CKD 5  [  ]Other  -------------------------------------------------------------------  [ x ]Diabetes  [  ] Diabetic PVD Ulcer  [ x ] Neuropathic ulcer to DM  [  ] Diabetes with Nephropathy  [  ] Osteomyelitis due to diabetes  [  ] Hyperglycemia   [  ]hypoglycemia   --------------------------------------------------------------------  [  ]Malnutrition: See Nutrition Note  [  ]Cachexia  [  ]Other:   [  ]Supplement Ordered:  [  ]Morbid Obesity (BMI >=40]  ---------------------------------------------------------------------  [ ] Sepsis/severe sepsis/septic shock  [ ] Noninfectious SIRS  [ ] UTI  [ ] Pneumonia  -----------------------------------------------------------------------  [ ] Acidosis/alkalosis  [ ] Fluid overload  [ ] Hypokalemia  [ ] Hyperkalemia  [ ] Hypomagnesemia  [ ] Hypophosphatemia  [ ] Hyperphosphatemia  ------------------------------------------------------------------------  [ ] Acute blood loss anemia  [ ] Post op blood loss anemia  [ ] Iron deficiency anemia  [ ] Anemia due to chronic disease  [ ] Hypercoagulable state  [ ] Thrombocytopenia  ----------------------------------------------------------------------  [ ] Cerebral infarction  [ ] Transient ischemia attack  [ ] Encephalopathy - Toxic or Metabolic         [  ]Heart Failure     [  ] Acute     [  ] Acute on Chronic     [  ] Chronic  -------------------------------------------------------------------     [  ]Diastolic [HFpEF]     [  ]Systolic [HFrEF]     [  ]Combined [HFpEF & HFrEF]  .................................................................................     [  ]Other:     [ ] Pulmonary Hypertension     [ ] Afib     [x ] Hypertensive Heart Disease  -------------------------------------------------------------------  [ ] Respiratory failure  [ ] Acute cor pulmonale  [ ] Asthma/COPD Exacerbation  [ ] Pleural effusion  [ ] Aspiration pneumonia  [ ] Obstructive Sleep Apnea  -------------------------------------------------------------------  [  ]ROSANNE     [  ]ATN     [  ]Reneal Medullary Necrosis     [  ]Renal Cortical Necrosis     [  ]Other Pathological Lesions:    [  ]CKD 1  [  ]CKD 2  [  ]CKD 3  [  ]CKD 4  [  ]CKD 5  [  ]Other  -------------------------------------------------------------------  [ x ]Diabetes  [  ] Diabetic PVD Ulcer  [ x ] Neuropathic ulcer to DM  [  ] Diabetes with Nephropathy  [  ] Osteomyelitis due to diabetes  [  ] Hyperglycemia   [  ]hypoglycemia   --------------------------------------------------------------------  [  ]Malnutrition: See Nutrition Note  [  ]Cachexia  [  ]Other:   [  ]Supplement Ordered:  [  ]Morbid Obesity (BMI >=40]  ---------------------------------------------------------------------  [ ] Sepsis/severe sepsis/septic shock  [ ] Noninfectious SIRS  [ ] UTI  [ ] Pneumonia  -----------------------------------------------------------------------  [ ] Acidosis/alkalosis  [ ] Fluid overload  [ ] Hypokalemia  [ ] Hyperkalemia  [ ] Hypomagnesemia  [ ] Hypophosphatemia  [ ] Hyperphosphatemia  ------------------------------------------------------------------------  [ ] Acute blood loss anemia  [ ] Post op blood loss anemia  [ ] Iron deficiency anemia  [ ] Anemia due to chronic disease  [ ] Hypercoagulable state  [ ] Thrombocytopenia  ----------------------------------------------------------------------  [ ] Cerebral infarction  [ ] Transient ischemia attack  [ ] Encephalopathy - Toxic or Metabolic          Assessment and Plan:   · Assessment	  45 y/o M pt with PMHx of DM, CAD, HTN, HLD  s/p amputation of R 3rd toe for gangrene  9/28/21, admitted w infection to amputation site. s/p R 3rd toe amputation 10/29.     Vascular/PAD:  -s/p right 3rd toe amputation 10/29  - f/u OR cx   - vac placed 10/30   -Continue ASA+ plavix     HLD:  -c/w Atorvastatin 40    HTN:   -c/w home lisinopril       CAD/CHF:   -Hx of CAD s/p stents at OSH   -Continue ASA  + Plavix and statin     DM:  -ISS   -Lantus at bedtime      ID:  -OR for infected toe debridement  -Continue Vanc+ zosyn for now   - vanc trough at 1 AM 10/31       DVT PPx: SQH       O/N: JIM, VSS trough 11 dose given     Patient seen and examined bedside with chief resident, - F/C/N/V, endorses minimal discomfort in his RLE,     lisinopril 20 milliGRAM(s) Oral daily  piperacillin/tazobactam IVPB.. 3.375 Gram(s) IV Intermittent every 6 hours  vancomycin  IVPB 1250 milliGRAM(s) IV Intermittent every 8 hours      Vital Signs Last 24 Hrs  T(C): 36 (30 Oct 2021 18:23), Max: 36.8 (30 Oct 2021 05:25)  T(F): 96.8 (30 Oct 2021 18:23), Max: 98.3 (30 Oct 2021 09:05)  HR: 68 (30 Oct 2021 16:30) (68 - 80)  BP: 154/76 (30 Oct 2021 16:30) (130/69 - 154/79)  BP(mean): 107 (30 Oct 2021 16:30) (104 - 111)  RR: 19 (30 Oct 2021 16:30) (18 - 19)  SpO2: 96% (30 Oct 2021 16:30) (94% - 96%)    I&O's Detail    29 Oct 2021 07:01  -  30 Oct 2021 07:00  --------------------------------------------------------  IN:    sodium chloride 0.9%: 600 mL  Total IN: 600 mL    OUT:    Oral Fluid: 0 mL    Voided (mL): 1000 mL  Total OUT: 1000 mL    Total NET: -400 mL      30 Oct 2021 07:01  -  30 Oct 2021 19:18  --------------------------------------------------------  IN:    Oral Fluid: 240 mL  Total IN: 240 mL    OUT:    Voided (mL): 650 mL  Total OUT: 650 mL    Total NET: -410 mL        Physical Exam  Constitutional: Pt AXOX3 in NAD  Respiratory: Non labored  Cardiovascular: S1S2  Extremities: R third toe amp site with fibrinous tissue, with mild surrounding erythema/ mild drainage, no LE edema, calfs soft and non tender  Vascular: R Tri DP/PT L palp DP. dressing changed  Lines/drains/tubes:                         [  ]Heart Failure     [  ] Acute     [  ] Acute on Chronic     [  ] Chronic  -------------------------------------------------------------------     [  ]Diastolic [HFpEF]     [  ]Systolic [HFrEF]     [  ]Combined [HFpEF & HFrEF]  .................................................................................     [  ]Other:     [ ] Pulmonary Hypertension     [ ] Afib     [x ] Hypertensive Heart Disease  -------------------------------------------------------------------  [ ] Respiratory failure  [ ] Acute cor pulmonale  [ ] Asthma/COPD Exacerbation  [ ] Pleural effusion  [ ] Aspiration pneumonia  [ ] Obstructive Sleep Apnea  -------------------------------------------------------------------  [  ]ROSANNE     [  ]ATN     [  ]Reneal Medullary Necrosis     [  ]Renal Cortical Necrosis     [  ]Other Pathological Lesions:    [  ]CKD 1  [  ]CKD 2  [  ]CKD 3  [  ]CKD 4  [  ]CKD 5  [  ]Other  -------------------------------------------------------------------  [ x ]Diabetes  [  ] Diabetic PVD Ulcer  [ x ] Neuropathic ulcer to DM  [  ] Diabetes with Nephropathy  [  ] Osteomyelitis due to diabetes  [  ] Hyperglycemia   [  ]hypoglycemia   --------------------------------------------------------------------  [  ]Malnutrition: See Nutrition Note  [  ]Cachexia  [  ]Other:   [  ]Supplement Ordered:  [  ]Morbid Obesity (BMI >=40]  ---------------------------------------------------------------------  [ ] Sepsis/severe sepsis/septic shock  [ ] Noninfectious SIRS  [ ] UTI  [ ] Pneumonia  -----------------------------------------------------------------------  [ ] Acidosis/alkalosis  [ ] Fluid overload  [ ] Hypokalemia  [ ] Hyperkalemia  [ ] Hypomagnesemia  [ ] Hypophosphatemia  [ ] Hyperphosphatemia  ------------------------------------------------------------------------  [ ] Acute blood loss anemia  [ ] Post op blood loss anemia  [ ] Iron deficiency anemia  [ ] Anemia due to chronic disease  [ ] Hypercoagulable state  [ ] Thrombocytopenia  ----------------------------------------------------------------------  [ ] Cerebral infarction  [ ] Transient ischemia attack  [ ] Encephalopathy - Toxic or Metabolic         [  ]Heart Failure     [  ] Acute     [  ] Acute on Chronic     [  ] Chronic  -------------------------------------------------------------------     [  ]Diastolic [HFpEF]     [  ]Systolic [HFrEF]     [  ]Combined [HFpEF & HFrEF]  .................................................................................     [  ]Other:     [ ] Pulmonary Hypertension     [ ] Afib     [x ] Hypertensive Heart Disease  -------------------------------------------------------------------  [ ] Respiratory failure  [ ] Acute cor pulmonale  [ ] Asthma/COPD Exacerbation  [ ] Pleural effusion  [ ] Aspiration pneumonia  [ ] Obstructive Sleep Apnea  -------------------------------------------------------------------  [  ]ROSANNE     [  ]ATN     [  ]Reneal Medullary Necrosis     [  ]Renal Cortical Necrosis     [  ]Other Pathological Lesions:    [  ]CKD 1  [  ]CKD 2  [  ]CKD 3  [  ]CKD 4  [  ]CKD 5  [  ]Other  -------------------------------------------------------------------  [ x ]Diabetes  [  ] Diabetic PVD Ulcer  [ x ] Neuropathic ulcer to DM  [  ] Diabetes with Nephropathy  [  ] Osteomyelitis due to diabetes  [  ] Hyperglycemia   [  ]hypoglycemia   --------------------------------------------------------------------  [  ]Malnutrition: See Nutrition Note  [  ]Cachexia  [  ]Other:   [  ]Supplement Ordered:  [  ]Morbid Obesity (BMI >=40]  ---------------------------------------------------------------------  [ ] Sepsis/severe sepsis/septic shock  [ ] Noninfectious SIRS  [ ] UTI  [ ] Pneumonia  -----------------------------------------------------------------------  [ ] Acidosis/alkalosis  [ ] Fluid overload  [ ] Hypokalemia  [ ] Hyperkalemia  [ ] Hypomagnesemia  [ ] Hypophosphatemia  [ ] Hyperphosphatemia  ------------------------------------------------------------------------  [ ] Acute blood loss anemia  [ ] Post op blood loss anemia  [ ] Iron deficiency anemia  [ ] Anemia due to chronic disease  [ ] Hypercoagulable state  [ ] Thrombocytopenia  ----------------------------------------------------------------------  [ ] Cerebral infarction  [ ] Transient ischemia attack  [ ] Encephalopathy - Toxic or Metabolic          Assessment and Plan:   · Assessment	  47 y/o M pt with PMHx of DM, CAD, HTN, HLD  s/p amputation of R 3rd toe for gangrene  9/28/21, admitted w infection to amputation site. s/p R 3rd toe amputation 10/29.     Vascular/PAD:  -s/p right 3rd toe amputation 10/29  - f/u OR cx   - vac placed 10/30   -Continue ASA+ plavix     HLD:  -c/w Atorvastatin 40    HTN:   -c/w home lisinopril       CAD/CHF:   -Hx of CAD s/p stents at OSH   -Continue ASA  + Plavix and statin     DM:  -ISS   -Lantus at bedtime, incr to 44 qd       ID:  -OR for infected toe debridement  -Continue Vanc+ zosyn for now   - vanc trough at 1 AM 10/31       DVT PPx: SQH

## 2021-10-30 NOTE — PROGRESS NOTE ADULT - ASSESSMENT
45YOM with history of obesity (BMI 30.6), type 2 diabetes (a1c Sept 2021 11.5%, started on insulin at that time), essential HTN, CAD s/p PCI, HLD, recent R 3rd toe amputation admitted to vascular surgery service for surgical site infection, now s/p debridement 10/28 and angiogram 10/29 s/p stents to PT and AT.    R 3rd toe surgical site infection  Diabetic foot infection  Peripheral arterial disease  Initial injury occurred after stepping on nail at work. s/p amputation of R 3rd toe 9/29, though with concern for slow healing and surgical site infection. Now s/p debridement 10/28, angiogram 10/29 with stents placed to PT and AT  -on vanco/zosyn for now  - f/u OR cultures - growing Staph and Strep, pending susceptbilities - can potentially de-escalate off zosyn tomorrow if no further growth  -ASA/statin/plavix    Type 2 diabetes  Hgb a1c 11.5% last admission Sept 2021. Follows with Dr. Linder. Home meds include metformin, Lantus 44U qhs, lispro 14U with breakfast, 8U with lunch and dinner  -home metformin on hold  -increase Lantus to home dose of 44U qhs today, continue home lispro 8U tidwm but can increase towards home dose as needed  -antibiotics in NS not D5  -FSBG qac/qhs with SSI    Essential hypertension  Takes lisinopril 20mg once daily at home. BP at goal  -continue home meds    CAD s/p PCI - continue home ASA/statin/plavix  Hyperlipidemia - continue home statin  Obesity - BMI is 30.6, affects all aspects of care     Dispo: pending cultures/abx plan, possibly Monday per vascular

## 2021-10-31 ENCOUNTER — TRANSCRIPTION ENCOUNTER (OUTPATIENT)
Age: 46
End: 2021-10-31

## 2021-10-31 LAB
CULTURE RESULTS: SIGNIFICANT CHANGE UP
GLUCOSE BLDC GLUCOMTR-MCNC: 139 MG/DL — HIGH (ref 70–99)
GLUCOSE BLDC GLUCOMTR-MCNC: 142 MG/DL — HIGH (ref 70–99)
GLUCOSE BLDC GLUCOMTR-MCNC: 159 MG/DL — HIGH (ref 70–99)
GLUCOSE BLDC GLUCOMTR-MCNC: 216 MG/DL — HIGH (ref 70–99)
ORGANISM # SPEC MICROSCOPIC CNT: SIGNIFICANT CHANGE UP
ORGANISM # SPEC MICROSCOPIC CNT: SIGNIFICANT CHANGE UP
SPECIMEN SOURCE: SIGNIFICANT CHANGE UP
VANCOMYCIN TROUGH SERPL-MCNC: 14.9 UG/ML — SIGNIFICANT CHANGE UP (ref 10–20)

## 2021-10-31 PROCEDURE — 99233 SBSQ HOSP IP/OBS HIGH 50: CPT

## 2021-10-31 RX ORDER — LANOLIN ALCOHOL/MO/W.PET/CERES
3 CREAM (GRAM) TOPICAL ONCE
Refills: 0 | Status: COMPLETED | OUTPATIENT
Start: 2021-10-31 | End: 2021-10-31

## 2021-10-31 RX ORDER — ACETAMINOPHEN 500 MG
650 TABLET ORAL ONCE
Refills: 0 | Status: COMPLETED | OUTPATIENT
Start: 2021-10-31 | End: 2021-10-31

## 2021-10-31 RX ORDER — VANCOMYCIN HCL 1 G
1250 VIAL (EA) INTRAVENOUS EVERY 8 HOURS
Refills: 0 | Status: DISCONTINUED | OUTPATIENT
Start: 2021-10-31 | End: 2021-10-31

## 2021-10-31 RX ADMIN — INSULIN GLARGINE 44 UNIT(S): 100 INJECTION, SOLUTION SUBCUTANEOUS at 22:02

## 2021-10-31 RX ADMIN — Medication 650 MILLIGRAM(S): at 20:01

## 2021-10-31 RX ADMIN — ATORVASTATIN CALCIUM 40 MILLIGRAM(S): 80 TABLET, FILM COATED ORAL at 22:00

## 2021-10-31 RX ADMIN — Medication 166.67 MILLIGRAM(S): at 05:59

## 2021-10-31 RX ADMIN — HEPARIN SODIUM 5000 UNIT(S): 5000 INJECTION INTRAVENOUS; SUBCUTANEOUS at 17:36

## 2021-10-31 RX ADMIN — HEPARIN SODIUM 5000 UNIT(S): 5000 INJECTION INTRAVENOUS; SUBCUTANEOUS at 00:09

## 2021-10-31 RX ADMIN — Medication 3 MILLIGRAM(S): at 22:01

## 2021-10-31 RX ADMIN — LISINOPRIL 20 MILLIGRAM(S): 2.5 TABLET ORAL at 06:00

## 2021-10-31 RX ADMIN — HEPARIN SODIUM 5000 UNIT(S): 5000 INJECTION INTRAVENOUS; SUBCUTANEOUS at 08:25

## 2021-10-31 RX ADMIN — Medication 4: at 17:36

## 2021-10-31 RX ADMIN — Medication 8 UNIT(S): at 12:39

## 2021-10-31 RX ADMIN — Medication 8 UNIT(S): at 17:37

## 2021-10-31 RX ADMIN — Medication 81 MILLIGRAM(S): at 12:39

## 2021-10-31 RX ADMIN — Medication 650 MILLIGRAM(S): at 19:01

## 2021-10-31 RX ADMIN — Medication 8 UNIT(S): at 08:24

## 2021-10-31 RX ADMIN — Medication 2: at 06:54

## 2021-10-31 RX ADMIN — PIPERACILLIN AND TAZOBACTAM 200 GRAM(S): 4; .5 INJECTION, POWDER, LYOPHILIZED, FOR SOLUTION INTRAVENOUS at 12:38

## 2021-10-31 RX ADMIN — CLOPIDOGREL BISULFATE 75 MILLIGRAM(S): 75 TABLET, FILM COATED ORAL at 12:38

## 2021-10-31 RX ADMIN — PIPERACILLIN AND TAZOBACTAM 200 GRAM(S): 4; .5 INJECTION, POWDER, LYOPHILIZED, FOR SOLUTION INTRAVENOUS at 06:00

## 2021-10-31 RX ADMIN — PIPERACILLIN AND TAZOBACTAM 200 GRAM(S): 4; .5 INJECTION, POWDER, LYOPHILIZED, FOR SOLUTION INTRAVENOUS at 17:36

## 2021-10-31 NOTE — DISCHARGE NOTE PROVIDER - [id number]_CORE_FT
PRINCIPAL DISCHARGE DIAGNOSIS  Diagnosis: Diabetic infection of right foot  Assessment and Plan of Treatment: Old right 3rd toe amputation site.      SECONDARY DISCHARGE DIAGNOSES  Diagnosis: Diabetic peripheral vascular disease  Assessment and Plan of Treatment:     Diagnosis: Class 1 obesity with serious comorbidity and body mass index (BMI) of 30.0 to 30.9 in adult  Assessment and Plan of Treatment:     Diagnosis: Hyperphosphatemia  Assessment and Plan of Treatment: 4.7    Diagnosis: CAD (coronary artery disease)  Assessment and Plan of Treatment:     Diagnosis: Hypertension  Assessment and Plan of Treatment:     Diagnosis: Hyperlipidemia  Assessment and Plan of Treatment:

## 2021-10-31 NOTE — PROGRESS NOTE ADULT - SUBJECTIVE AND OBJECTIVE BOX
O/N: trough at 545 am 14.9, vanc 1250 given at 6 am   10/30: lisinopril 20 restarted, lantus incr to 44 (From 38), VAC placed     STATUS POST:  R third toe metatarsal head removal and RLE angio with AT stent     SUBJECTIVE: Patient seen and examined bedside by chief resident. No acute complaints.     aspirin enteric coated 81 milliGRAM(s) Oral daily  clopidogrel Tablet 75 milliGRAM(s) Oral daily  heparin   Injectable 5000 Unit(s) SubCutaneous every 8 hours  lisinopril 20 milliGRAM(s) Oral daily  piperacillin/tazobactam IVPB.. 3.375 Gram(s) IV Intermittent every 6 hours  vancomycin  IVPB 1250 milliGRAM(s) IV Intermittent every 8 hours      Vital Signs Last 24 Hrs  T(C): 36.6 (31 Oct 2021 04:27), Max: 36.6 (31 Oct 2021 04:27)  T(F): 97.9 (31 Oct 2021 04:27), Max: 97.9 (31 Oct 2021 04:27)  HR: 70 (31 Oct 2021 08:22) (68 - 74)  BP: 129/70 (31 Oct 2021 08:22) (129/70 - 154/79)  BP(mean): 94 (31 Oct 2021 08:22) (94 - 111)  RR: 19 (31 Oct 2021 08:22) (19 - 19)  SpO2: 96% (31 Oct 2021 08:22) (94% - 96%)  I&O's Detail    30 Oct 2021 07:01  -  31 Oct 2021 07:00  --------------------------------------------------------  IN:    IV PiggyBack: 500 mL    IV PiggyBack: 200 mL    Oral Fluid: 420 mL  Total IN: 1120 mL    OUT:    Voided (mL): 1225 mL  Total OUT: 1225 mL    Total NET: -105 mL          Physical Exam:  Constitutional: Resting in bed comfortably in bed  Respiratory: Non labored breathing, no respiratory distress  Cardiovascular: NSR  Extremities: R third toe amp site with vac in place, no LE edema, calfs soft and non tender  Vascular: R Tri DP/PT L palp DP.    LABS:                        11.3   6.71  )-----------( 176      ( 30 Oct 2021 08:32 )             33.1     10-30    139  |  104  |  9   ----------------------------<  197<H>  3.9   |  27  |  0.70    Ca    8.8      30 Oct 2021 08:32  Phos  2.7     10-30  Mg     2.1     10-30            RADIOLOGY & ADDITIONAL STUDIES:      --------------------------------------------------------------------------  PLEASE CHECK WHEN PRESENT:     [  ]Heart Failure     [  ] Acute     [  ] Acute on Chronic     [  ] Chronic  -------------------------------------------------------------------     [  ]Diastolic [HFpEF]     [  ]Systolic [HFrEF]     [  ]Combined [HFpEF & HFrEF]  .................................................................................     [  ]Other:     [ ] Pulmonary Hypertension     [ ] Afib     [x ] Hypertensive Heart Disease  -------------------------------------------------------------------  [ ] Respiratory failure  [ ] Acute cor pulmonale  [ ] Asthma/COPD Exacerbation  [ ] Pleural effusion  [ ] Aspiration pneumonia  [ ] Obstructive Sleep Apnea  -------------------------------------------------------------------  [  ]ROSANNE     [  ]ATN     [  ]Reneal Medullary Necrosis     [  ]Renal Cortical Necrosis     [  ]Other Pathological Lesions:    [  ]CKD 1  [  ]CKD 2  [  ]CKD 3  [  ]CKD 4  [  ]CKD 5  [  ]Other  -------------------------------------------------------------------  [ x ]Diabetes  [  ] Diabetic PVD Ulcer  [ x ] Neuropathic ulcer to DM  [  ] Diabetes with Nephropathy  [  ] Osteomyelitis due to diabetes  [  ] Hyperglycemia   [  ]hypoglycemia   --------------------------------------------------------------------  [  ]Malnutrition: See Nutrition Note  [  ]Cachexia  [  ]Other:   [  ]Supplement Ordered:  [  ]Morbid Obesity (BMI >=40]  ---------------------------------------------------------------------  [ ] Sepsis/severe sepsis/septic shock  [ ] Noninfectious SIRS  [ ] UTI  [ ] Pneumonia  -----------------------------------------------------------------------  [ ] Acidosis/alkalosis  [ ] Fluid overload  [ ] Hypokalemia  [ ] Hyperkalemia  [ ] Hypomagnesemia  [ ] Hypophosphatemia  [ ] Hyperphosphatemia  ------------------------------------------------------------------------  [ ] Acute blood loss anemia  [ ] Post op blood loss anemia  [ ] Iron deficiency anemia  [ ] Anemia due to chronic disease  [ ] Hypercoagulable state  [ ] Thrombocytopenia  ----------------------------------------------------------------------  [ ] Cerebral infarction  [ ] Transient ischemia attack  [ ] Encephalopathy - Toxic or Metabolic      47 y/o M pt with PMHx of DM, CAD, HTN, HLD  s/p amputation of R 3rd toe for gangrene  9/28/21, admitted w infection to amputation site. s/p R third toe metatarsal head removal and RLE angio with AT stent    Vascular/PAD:  -right 3rd toe amputation site with infection   - R third toe metatarsal head removal and RLE angio with AT stent  - wound vac to amputation  -Continue ASA+ plavix       HLD:  -c/w Atorvastatin 40    HTN:   - c/w lisinopril      CAD/CHF:   -Hx of CAD s/p stents at OSH   -Continue ASA  + Plavix and statin     DM:  -ISS   -Lantus at bedtime      ID:  -OR for infcted toe debridement  -Continue Vanc+ zosyn for now       DVT PPx: SQH     Dispo: home with vac tomorrow

## 2021-10-31 NOTE — DISCHARGE NOTE PROVIDER - NSDCFUADDINST_GEN_ALL_CORE_FT
FOLLOW UP:  Dr. Cary in 1 week. Your appointment has been made for _______.   CALL OFFICE FOR CONCERNS:   [phone] with any questions.  Call the office if you experience increasing pain, redness, swelling or drainage from right foot wound, or temperature >101.4F.WOUND   CARE:  Clean wound daily with saline. Daily dressing change with saline moistened gauze to open wound and wrap with kerlix. No baths.  You may shower, remove dressing first. Allow soap and water to run over incision sites. Do not scrub. Pat dry. Redress when done.   ACTIVITY:  Ambulate as tolerated with heel weight bearing on right foot until wound healed.   No heavy lifting (>10lbs) or strenuous exercise.  DIET:   Diabetic Diet   NEW MEDICATIONS:  ADDITIONAL FOLLOW UP AFTER DISCHARGE:  DISCHARGE DESTINATION:   FOLLOW UP:  Dr. Cary in 1 week. Your appointment has been made for  11/9/21 at 1pm .   CALL OFFICE FOR CONCERNS:   [phone] with any questions.  Call the office if you experience increasing pain, redness, swelling or drainage from right foot wound, or temperature >101.4F.WOUND   CARE:  Clean wound daily with saline. Daily dressing change with saline moistened gauze to open wound and wrap with kerlix. No baths.  You may shower, remove dressing first. Allow soap and water to run over incision sites. Do not scrub. Pat dry. Redress when done.   ACTIVITY:  Ambulate as tolerated with heel weight bearing on right foot until wound healed.   No heavy lifting (>10lbs) or strenuous exercise.  DIET:   Diabetic Diet   NEW MEDICATIONS:  ADDITIONAL FOLLOW UP AFTER DISCHARGE:  DISCHARGE DESTINATION:

## 2021-10-31 NOTE — DISCHARGE NOTE PROVIDER - HOSPITAL COURSE
45 y/o M pt with PMHx of DM, CAD, HTN, HLD and recently s/p amputation of R 3rd toe for gangrene after stepping on a nail 9/28/21.  Pt presents to ED c/o, increased drainage  and foul odor from wound in past few days. No pain at site.  Noticed mild swelling of lower leg  and foot late in the day, but improves after sleeping overnight.  No hx of DVT.  Pt denies fever or chills.     Admitted 10/27/21 via ER with right diabetic foot infection at old amp site. Started on vanco/zosyn. Taken to OR 10/28/21. He underwent right 3rd metatarsal head amputation and debridement. He tolerated well. He returned to OR 10/29/21 for right LE angio and right AT/PT angioplasty/stents.      45 y/o M pt with PMHx of DM, CAD, HTN, HLD and recently s/p amputation of R 3rd toe for gangrene after stepping on a nail 9/28/21.  Pt presents to ED c/o, increased drainage  and foul odor from wound in past few days. No pain at site.  Noticed mild swelling of lower leg  and foot late in the day, but improves after sleeping overnight.  No hx of DVT.  Pt denies fever or chills.     Admitted 10/27/21 via ER with right diabetic foot infection at old amp site. Started on vanco/zosyn. Taken to OR 10/28/21. He underwent right 3rd metatarsal head amputation and debridement. He tolerated well. He returned to OR 10/29/21 for right LE angio and right AT/PT angioplasty/stents. Following procedure diet was advanced.      47 y/o M pt with PMHx of DM, CAD, HTN, HLD and recently s/p amputation of R 3rd toe for gangrene after stepping on a nail 9/28/21.  Pt presents to ED c/o, increased drainage  and foul odor from wound in past few days. No pain at site.  Noticed mild swelling of lower leg  and foot late in the day, but improves after sleeping overnight.  No hx of DVT.  Pt denies fever or chills.     Admitted 10/27/21 via ER with right diabetic foot infection at old amp site. Started on vanco/zosyn. Taken to OR 10/28/21. He underwent right 3rd metatarsal head amputation and debridement. He tolerated well. He returned to OR 10/29/21 for right LE angio and right AT/PT angioplasty/stents. Following procedure diet was advanced. Pain is well managed, he is tolerating diet. He is deemed safe for discharge with home care and vac application.

## 2021-10-31 NOTE — DISCHARGE NOTE PROVIDER - CARE PROVIDER_API CALL
Thomas Cary)  Vascular Surgery  [address]  [place], [place] [address]  Phone: [phone]  Fax: [phone]  Follow Up Time:

## 2021-10-31 NOTE — PROGRESS NOTE ADULT - ASSESSMENT
45YOM with history of obesity (BMI 30.6), type 2 diabetes (a1c Sept 2021 11.5%, started on insulin at that time), essential HTN, CAD s/p PCI, HLD, recent R 3rd toe amputation admitted to vascular surgery service for surgical site infection, now s/p debridement 10/28 and angiogram 10/29 s/p stents to PT and AT.    R 3rd toe surgical site infection  Diabetic foot infection  Peripheral arterial disease  Initial injury occurred after stepping on nail at work. s/p amputation of R 3rd toe 9/29, though with concern for slow healing and surgical site infection. Now s/p debridement 10/28, angiogram 10/29 with stents placed to PT and AT. OR cultures polymicrobial, including MSSA, Strep mitis/oralis, Achromobacter, Staph caprae, Helcococcus, Actinomyces, miuxed anaerobes  -can d/c vanco, continue zosyn  -ASA/statin/plavix    Type 2 diabetes  Hgb a1c 11.5% last admission Sept 2021. Follows with Dr. Linder. Home meds include metformin, Lantus 44U qhs, lispro 14U with breakfast, 8U with lunch and dinner  -home metformin on hold  -continue home Lantus 44U qhs, home lispro 8U tidwm   -antibiotics in NS not D5  -FSBG qac/qhs with SSI    Essential hypertension  Takes lisinopril 20mg once daily at home. BP at goal  -continue home meds    CAD s/p PCI - continue home ASA/statin/plavix  Hyperlipidemia - continue home statin  Obesity - BMI is 30.6, affects all aspects of care     Dispo: needs wound vac delivered to home, likely d/c home tomorrow per vascular

## 2021-10-31 NOTE — PROGRESS NOTE ADULT - SUBJECTIVE AND OBJECTIVE BOX
Subjective/Interval events:  No acute overnight events. VAC placed yesterday - had some pain yesterday and this morning but doing better midday when I evaluated him. No fever/chills. Tolerating diet.    MEDICATIONS  (STANDING):  aspirin enteric coated 81 milliGRAM(s) Oral daily  atorvastatin 40 milliGRAM(s) Oral at bedtime  clopidogrel Tablet 75 milliGRAM(s) Oral daily  dextrose 40% Gel 15 Gram(s) Oral once  dextrose 5%. 1000 milliLiter(s) (50 mL/Hr) IV Continuous <Continuous>  dextrose 5%. 1000 milliLiter(s) (100 mL/Hr) IV Continuous <Continuous>  dextrose 50% Injectable 25 Gram(s) IV Push once  dextrose 50% Injectable 12.5 Gram(s) IV Push once  dextrose 50% Injectable 25 Gram(s) IV Push once  glucagon  Injectable 1 milliGRAM(s) IntraMuscular once  heparin   Injectable 5000 Unit(s) SubCutaneous every 8 hours  influenza   Vaccine 0.5 milliLiter(s) IntraMuscular once  insulin glargine Injectable (LANTUS) 44 Unit(s) SubCutaneous at bedtime  insulin lispro (ADMELOG) corrective regimen sliding scale   SubCutaneous Before meals and at bedtime  insulin lispro Injectable (ADMELOG) 8 Unit(s) SubCutaneous three times a day before meals  lisinopril 20 milliGRAM(s) Oral daily  piperacillin/tazobactam IVPB.. 3.375 Gram(s) IV Intermittent every 6 hours    MEDICATIONS  (PRN):    Vital Signs Last 24 Hrs  T(C): 36.6 (31 Oct 2021 10:38), Max: 36.6 (31 Oct 2021 04:27)  T(F): 97.8 (31 Oct 2021 10:38), Max: 97.9 (31 Oct 2021 04:27)  HR: 70 (31 Oct 2021 08:22) (68 - 74)  BP: 129/70 (31 Oct 2021 08:22) (129/70 - 154/79)  BP(mean): 94 (31 Oct 2021 08:22) (94 - 111)  RR: 19 (31 Oct 2021 08:22) (19 - 19)  SpO2: 96% (31 Oct 2021 08:22) (94% - 96%)    PHYSICAL EXAM:  GENERAL: pleasant, appropriate, no acute distress. Participating appropriately in interview  HEENT - NC/AT, EOMI, PERLLA, oropharynx clear without exudate or erythema, moist mucus membranes  NECK: Soft, supple, No JVD, no lymphadenopathy  CHEST/LUNG: Clear to auscultation bilaterally; No rales, rhonchi, wheezing. Normal work of breathing, not tachypneic  HEART: Regular rate and rhythm; No murmurs, rubs, or gallops, normal s1/s2. Warm and well-perfused  ABDOMEN: obese, soft, Nontender, Nondistended. Normoactive bowel sounds.  EXTREMITIES:  2+ Peripheral Pulses, No clubbing, cyanosis, or edema  NEURO:  awake, alert, oriented to person, place, time, and situation. Cranial nerves intact, no motor or sensory deficits. Moves all extremities.  SKIN: No rashes or lesions. R foot s/p toe amputation, overlying wound vac surrounding skin without erythema    LABS:  10-30    139  |  104  |  9   ----------------------------<  197<H>  3.9   |  27  |  0.70    Ca    8.8      30 Oct 2021 08:32  Phos  2.7     10-30  Mg     2.1     10-30                          11.3   6.71  )-----------( 176      ( 30 Oct 2021 08:32 )             33.1   CAPILLARY BLOOD GLUCOSE      POCT Blood Glucose.: 159 mg/dL (31 Oct 2021 06:39)  POCT Blood Glucose.: 142 mg/dL (30 Oct 2021 21:35)  POCT Blood Glucose.: 177 mg/dL (30 Oct 2021 17:03)  POCT Blood Glucose.: 200 mg/dL (30 Oct 2021 12:07)        Culture - Fungal, Tissue (collected 29 Oct 2021 00:43)  Source: .Tissue rt 3rd toe metatarsal or spec  Preliminary Report (29 Oct 2021 08:37):    Testing in progress    Culture - Fungal, Tissue (collected 29 Oct 2021 00:43)  Source: .Tissue rt 3rd toe tissue or spec  Preliminary Report (29 Oct 2021 08:37):    Testing in progress    Culture - Tissue with Gram Stain (collected 28 Oct 2021 17:03)  Source: .Tissue rt 3rd toe tissue or spec  Gram Stain (28 Oct 2021 21:33):    No organisms seen    No WBC's seen.  Preliminary Report (30 Oct 2021 13:44):    Rare Streptococcus mitis/oralis group    Rare Staphylococcus aureus    Rare Achromobacter xylosoxidans    Rare Staphylococcus caprae    Rare Helcococcus kunzii    Rare Actinomyces odontolyticus    Mixed Anaerobic Joanna including:    Few Prevotella bivia    Rare Bacteroides thetaiotamcron group    Culture grew 3 or more types of organisms which indicate collection    contamination; consider recollection only if clinically indicated.  Organism: Staphylococcus aureus (30 Oct 2021 13:38)  Organism: Staphylococcus aureus (30 Oct 2021 13:38)    Culture - Tissue with Gram Stain (collected 28 Oct 2021 17:01)  Source: .Tissue rt 3rd toe metatarsal or spec  Gram Stain (28 Oct 2021 17:03):    Test cannot be performed on this type of specimen.  Preliminary Report (29 Oct 2021 08:38):    No growth to date        RADIOLOGY & ADDITIONAL TESTS:  reviewed, none new

## 2021-10-31 NOTE — DISCHARGE NOTE PROVIDER - NSDCMRMEDTOKEN_GEN_ALL_CORE_FT
alcohol swabs : Apply topically to affected area 4 times a day   amoxicillin 875 mg oral tablet: 1 tab(s) orally 2 times a day   ascorbic acid 500 mg oral tablet: 1 tab(s) orally once a day  aspirin 81 mg oral delayed release tablet: 1 tab(s) orally once a day  atorvastatin 40 mg oral tablet: 1 tab(s) orally once a day  Bactrim  mg-160 mg oral tablet: 1 tab(s) orally 2 times a day   clopidogrel 75 mg oral tablet: 1 tab(s) orally once a day  glucometer (per patient&#x27;s insurance): Test blood sugars four times a day. Dispense #1 glucometer.  HumaLOG 100 units/mL injectable solution: 8 unit(s) subcutaneous, 3 times a day (before meals)   Insulin Pen Needles, 4mm: 1 application subcutaneously 4 times a day. ** Use with insulin pen **   lancets: 1 application subcutaneously 4 times a day   Lantus 100 units/mL subcutaneous solution: 38 unit(s) subcutaneous once a day (at bedtime)   lisinopril 20 mg oral tablet: 1 tab(s) orally once a day  Multiple Vitamins oral tablet: 1 tab(s) orally once a day  test strips (per patient&#x27;s insurance): 1 application subcutaneously 4 times a day. ** Compatible with patient&#x27;s glucometer **   amoxicillin 875 mg oral tablet: 1 tab(s) orally 2 times a day   ascorbic acid 500 mg oral tablet: 1 tab(s) orally once a day  aspirin 81 mg oral delayed release tablet: 1 tab(s) orally once a day  atorvastatin 40 mg oral tablet: 1 tab(s) orally once a day  Bactrim  mg-160 mg oral tablet: 1 tab(s) orally 2 times a day   Bactrim  mg-160 mg oral tablet: 1 tab(s) orally 2 times a day   clopidogrel 75 mg oral tablet: 1 tab(s) orally once a day  Flagyl 500 mg oral tablet: 1 tab(s) orally 2 times a day   glucometer (per patient&#x27;s insurance): Test blood sugars four times a day. Dispense #1 glucometer.  HumaLOG 100 units/mL injectable solution: 8 unit(s) subcutaneous, 3 times a day (before meals)   Insulin Pen Needles, 4mm: 1 application subcutaneously 4 times a day. ** Use with insulin pen **   lancets: 1 application subcutaneously 4 times a day   Lantus 100 units/mL subcutaneous solution: 38 unit(s) subcutaneous once a day (at bedtime)   lisinopril 20 mg oral tablet: 1 tab(s) orally once a day  Multiple Vitamins oral tablet: 1 tab(s) orally once a day  test strips (per patient&#x27;s insurance): 1 application subcutaneously 4 times a day. ** Compatible with patient&#x27;s glucometer **

## 2021-10-31 NOTE — DISCHARGE NOTE PROVIDER - NSDCFUSCHEDAPPT_GEN_ALL_CORE_FT
DAKOTA HARGROVE ; 11/02/2021 ; [facility] Surg Vasc [address]   DAKOTA HARGROVE ; 11/18/2021 ; [facility] ; [facility]/18/[facility]21 ; [facility] [address] [facility] ; 11/18/2021 ; [facility] [address]  DAKOTA HARGROVE ; 12/14/2021 ; [facility] Endocrin [address] DAKOTA HARGROVE ; 11/09/2021 ; [facility] Surg Vasc [address]   DAKOTA HARGROVE ; 11/18/2021 ; [facility] [address] [facility] ; 11/18/[facility]21 ; [facility] [address] [facility] ; 11/18/2021 ; [facility] Heart[facility] [address]  DAKOTA HARGROVE ; 12/14/2021 ; [facility] Endocrin [address]

## 2021-11-01 ENCOUNTER — TRANSCRIPTION ENCOUNTER (OUTPATIENT)
Age: 46
End: 2021-11-01

## 2021-11-01 VITALS — TEMPERATURE: 98 F

## 2021-11-01 LAB
ANION GAP SERPL CALC-SCNC: 8 MMOL/L — SIGNIFICANT CHANGE UP (ref 5–17)
BUN SERPL-MCNC: 11 MG/DL — SIGNIFICANT CHANGE UP (ref 7–23)
CALCIUM SERPL-MCNC: 9.2 MG/DL — SIGNIFICANT CHANGE UP (ref 8.4–10.5)
CHLORIDE SERPL-SCNC: 106 MMOL/L — SIGNIFICANT CHANGE UP (ref 96–108)
CO2 SERPL-SCNC: 28 MMOL/L — SIGNIFICANT CHANGE UP (ref 22–31)
CREAT SERPL-MCNC: 0.75 MG/DL — SIGNIFICANT CHANGE UP (ref 0.5–1.3)
CULTURE RESULTS: SIGNIFICANT CHANGE UP
CULTURE RESULTS: SIGNIFICANT CHANGE UP
GLUCOSE BLDC GLUCOMTR-MCNC: 146 MG/DL — HIGH (ref 70–99)
GLUCOSE BLDC GLUCOMTR-MCNC: 152 MG/DL — HIGH (ref 70–99)
GLUCOSE BLDC GLUCOMTR-MCNC: 165 MG/DL — HIGH (ref 70–99)
GLUCOSE SERPL-MCNC: 170 MG/DL — HIGH (ref 70–99)
HCT VFR BLD CALC: 35 % — LOW (ref 39–50)
HGB BLD-MCNC: 11.4 G/DL — LOW (ref 13–17)
MAGNESIUM SERPL-MCNC: 2 MG/DL — SIGNIFICANT CHANGE UP (ref 1.6–2.6)
MCHC RBC-ENTMCNC: 30.9 PG — SIGNIFICANT CHANGE UP (ref 27–34)
MCHC RBC-ENTMCNC: 32.6 GM/DL — SIGNIFICANT CHANGE UP (ref 32–36)
MCV RBC AUTO: 94.9 FL — SIGNIFICANT CHANGE UP (ref 80–100)
NRBC # BLD: 0 /100 WBCS — SIGNIFICANT CHANGE UP (ref 0–0)
PHOSPHATE SERPL-MCNC: 3.9 MG/DL — SIGNIFICANT CHANGE UP (ref 2.5–4.5)
PLATELET # BLD AUTO: 195 K/UL — SIGNIFICANT CHANGE UP (ref 150–400)
POTASSIUM SERPL-MCNC: 4.2 MMOL/L — SIGNIFICANT CHANGE UP (ref 3.5–5.3)
POTASSIUM SERPL-SCNC: 4.2 MMOL/L — SIGNIFICANT CHANGE UP (ref 3.5–5.3)
RBC # BLD: 3.69 M/UL — LOW (ref 4.2–5.8)
RBC # FLD: 12.7 % — SIGNIFICANT CHANGE UP (ref 10.3–14.5)
SODIUM SERPL-SCNC: 142 MMOL/L — SIGNIFICANT CHANGE UP (ref 135–145)
SPECIMEN SOURCE: SIGNIFICANT CHANGE UP
SPECIMEN SOURCE: SIGNIFICANT CHANGE UP
WBC # BLD: 5.53 K/UL — SIGNIFICANT CHANGE UP (ref 3.8–10.5)
WBC # FLD AUTO: 5.53 K/UL — SIGNIFICANT CHANGE UP (ref 3.8–10.5)

## 2021-11-01 PROCEDURE — 36415 COLL VENOUS BLD VENIPUNCTURE: CPT

## 2021-11-01 PROCEDURE — 82962 GLUCOSE BLOOD TEST: CPT

## 2021-11-01 PROCEDURE — 85652 RBC SED RATE AUTOMATED: CPT

## 2021-11-01 PROCEDURE — 87075 CULTR BACTERIA EXCEPT BLOOD: CPT

## 2021-11-01 PROCEDURE — C1874: CPT

## 2021-11-01 PROCEDURE — 99233 SBSQ HOSP IP/OBS HIGH 50: CPT

## 2021-11-01 PROCEDURE — 87102 FUNGUS ISOLATION CULTURE: CPT

## 2021-11-01 PROCEDURE — C1887: CPT

## 2021-11-01 PROCEDURE — 76000 FLUOROSCOPY <1 HR PHYS/QHP: CPT

## 2021-11-01 PROCEDURE — 73630 X-RAY EXAM OF FOOT: CPT

## 2021-11-01 PROCEDURE — 80053 COMPREHEN METABOLIC PANEL: CPT

## 2021-11-01 PROCEDURE — 87070 CULTURE OTHR SPECIMN AEROBIC: CPT

## 2021-11-01 PROCEDURE — C1894: CPT

## 2021-11-01 PROCEDURE — 85730 THROMBOPLASTIN TIME PARTIAL: CPT

## 2021-11-01 PROCEDURE — C1769: CPT

## 2021-11-01 PROCEDURE — 83735 ASSAY OF MAGNESIUM: CPT

## 2021-11-01 PROCEDURE — 93971 EXTREMITY STUDY: CPT

## 2021-11-01 PROCEDURE — 86900 BLOOD TYPING SEROLOGIC ABO: CPT

## 2021-11-01 PROCEDURE — 88304 TISSUE EXAM BY PATHOLOGIST: CPT

## 2021-11-01 PROCEDURE — 85025 COMPLETE CBC W/AUTO DIFF WBC: CPT

## 2021-11-01 PROCEDURE — 87040 BLOOD CULTURE FOR BACTERIA: CPT

## 2021-11-01 PROCEDURE — 99285 EMERGENCY DEPT VISIT HI MDM: CPT

## 2021-11-01 PROCEDURE — 80048 BASIC METABOLIC PNL TOTAL CA: CPT

## 2021-11-01 PROCEDURE — 86850 RBC ANTIBODY SCREEN: CPT

## 2021-11-01 PROCEDURE — 86140 C-REACTIVE PROTEIN: CPT

## 2021-11-01 PROCEDURE — 86769 SARS-COV-2 COVID-19 ANTIBODY: CPT

## 2021-11-01 PROCEDURE — 84100 ASSAY OF PHOSPHORUS: CPT

## 2021-11-01 PROCEDURE — 86901 BLOOD TYPING SEROLOGIC RH(D): CPT

## 2021-11-01 PROCEDURE — 71046 X-RAY EXAM CHEST 2 VIEWS: CPT

## 2021-11-01 PROCEDURE — 80202 ASSAY OF VANCOMYCIN: CPT

## 2021-11-01 PROCEDURE — 85027 COMPLETE CBC AUTOMATED: CPT

## 2021-11-01 PROCEDURE — 87186 SC STD MICRODIL/AGAR DIL: CPT

## 2021-11-01 PROCEDURE — 87635 SARS-COV-2 COVID-19 AMP PRB: CPT

## 2021-11-01 PROCEDURE — 85610 PROTHROMBIN TIME: CPT

## 2021-11-01 PROCEDURE — 88311 DECALCIFY TISSUE: CPT

## 2021-11-01 RX ORDER — AZTREONAM 2 G
1 VIAL (EA) INJECTION
Qty: 28 | Refills: 0
Start: 2021-11-01 | End: 2021-11-14

## 2021-11-01 RX ORDER — METRONIDAZOLE 500 MG
1 TABLET ORAL
Qty: 28 | Refills: 0
Start: 2021-11-01 | End: 2021-11-14

## 2021-11-01 RX ADMIN — HEPARIN SODIUM 5000 UNIT(S): 5000 INJECTION INTRAVENOUS; SUBCUTANEOUS at 10:04

## 2021-11-01 RX ADMIN — PIPERACILLIN AND TAZOBACTAM 200 GRAM(S): 4; .5 INJECTION, POWDER, LYOPHILIZED, FOR SOLUTION INTRAVENOUS at 13:02

## 2021-11-01 RX ADMIN — PIPERACILLIN AND TAZOBACTAM 200 GRAM(S): 4; .5 INJECTION, POWDER, LYOPHILIZED, FOR SOLUTION INTRAVENOUS at 00:38

## 2021-11-01 RX ADMIN — PIPERACILLIN AND TAZOBACTAM 200 GRAM(S): 4; .5 INJECTION, POWDER, LYOPHILIZED, FOR SOLUTION INTRAVENOUS at 07:16

## 2021-11-01 RX ADMIN — CLOPIDOGREL BISULFATE 75 MILLIGRAM(S): 75 TABLET, FILM COATED ORAL at 13:01

## 2021-11-01 RX ADMIN — HEPARIN SODIUM 5000 UNIT(S): 5000 INJECTION INTRAVENOUS; SUBCUTANEOUS at 00:34

## 2021-11-01 RX ADMIN — LISINOPRIL 20 MILLIGRAM(S): 2.5 TABLET ORAL at 07:17

## 2021-11-01 RX ADMIN — Medication 8 UNIT(S): at 07:18

## 2021-11-01 RX ADMIN — Medication 8 UNIT(S): at 13:06

## 2021-11-01 RX ADMIN — Medication 81 MILLIGRAM(S): at 13:02

## 2021-11-01 RX ADMIN — Medication 2: at 07:16

## 2021-11-01 NOTE — DISCHARGE NOTE NURSING/CASE MANAGEMENT/SOCIAL WORK - PATIENT PORTAL LINK FT
You can access the FollowMyHealth Patient Portal offered by [facility] by registering at the following website: [email or web address]. By joining [employer]’s FollowMyHealth portal, you will also be able to view your health information using other applications (apps) compatible with our system.

## 2021-11-01 NOTE — PROGRESS NOTE ADULT - ASSESSMENT
45YOM with history of obesity (BMI 30.6), type 2 diabetes (a1c Sept 2021 11.5%, started on insulin at that time), essential HTN, CAD s/p PCI, HLD, recent R 3rd toe amputation admitted to vascular surgery service for surgical site infection, now s/p debridement 10/28 and angiogram 10/29 s/p stents to PT and AT.    R 3rd toe surgical site infection  Diabetic foot infection  Peripheral arterial disease  Initial injury occurred after stepping on nail at work. s/p amputation of R 3rd toe 9/29, though with concern for slow healing and surgical site infection. Now s/p debridement 10/28, angiogram 10/29 with stents placed to PT and AT. OR cultures polymicrobial, including MSSA, Strep mitis/oralis, Achromobacter, Staph caprae, Helcococcus, Actinomyces, anaerobes  -can d/c with Bactrim/flagyl  -ASA/statin/plavix    Type 2 diabetes  Hgb a1c 11.5% last admission Sept 2021. Follows with Dr. Linder. Home meds include metformin, Lantus 44U qhs, lispro 14U with breakfast, 8U with lunch and dinner  -home metformin on hold  -continue home Lantus 44U qhs, home lispro 8U tidwm   -antibiotics in NS not D5  -FSBG qac/qhs with SSI    Essential hypertension  Takes lisinopril 20mg once daily at home. BP at goal  -continue home meds    CAD s/p PCI - continue home ASA/statin/plavix  Hyperlipidemia - continue home statin  Obesity - BMI is 30.6, affects all aspects of care     Dispo: plan d/c home today with wound vac

## 2021-11-01 NOTE — DISCHARGE NOTE NURSING/CASE MANAGEMENT/SOCIAL WORK - NSDCDMETYPESERV_GEN_ALL_CORE_FT
wound vac to be delivered to patients home around 3pm by [employer]. Patient must be home and must contact [employer] in AM to confirm phone number and address.

## 2021-11-01 NOTE — DISCHARGE NOTE NURSING/CASE MANAGEMENT/SOCIAL WORK - NSDCVIVACCINE_GEN_ALL_CORE_FT
Tdap; 28-Sep-2021 21:47; Jorge Stark (BRYCE); [employer]; [id number] (Exp. Date: 15-Jul-2023); IntraMuscular; Deltoid Right.; 0.5 milliLiter(s); VIS (VIS Published: 09-May-2013, VIS Presented: 28-Sep-2021);

## 2021-11-01 NOTE — PROGRESS NOTE ADULT - REASON FOR ADMISSION
Infected Toe

## 2021-11-01 NOTE — PROGRESS NOTE ADULT - PROVIDER SPECIALTY LIST ADULT
Vascular Surgery
Hospitalist
Hospitalist
Vascular Surgery
Hospitalist

## 2021-11-01 NOTE — PROGRESS NOTE ADULT - SUBJECTIVE AND OBJECTIVE BOX
O/N: JACINTO FERNANDEZ  10/31: indy'ed vanc, wound vac leaking pathced up      --------------------------------------------------------------------------  PLEASE CHECK WHEN PRESENT:     [  ]Heart Failure     [  ] Acute     [  ] Acute on Chronic     [  ] Chronic  -------------------------------------------------------------------     [  ]Diastolic [HFpEF]     [  ]Systolic [HFrEF]     [  ]Combined [HFpEF & HFrEF]  .................................................................................     [  ]Other:     [ ] Pulmonary Hypertension     [ ] Afib     [x ] Hypertensive Heart Disease  -------------------------------------------------------------------  [ ] Respiratory failure  [ ] Acute cor pulmonale  [ ] Asthma/COPD Exacerbation  [ ] Pleural effusion  [ ] Aspiration pneumonia  [ ] Obstructive Sleep Apnea  -------------------------------------------------------------------  [  ]ROSANNE     [  ]ATN     [  ]Reneal Medullary Necrosis     [  ]Renal Cortical Necrosis     [  ]Other Pathological Lesions:    [  ]CKD 1  [  ]CKD 2  [  ]CKD 3  [  ]CKD 4  [  ]CKD 5  [  ]Other  -------------------------------------------------------------------  [ x ]Diabetes  [  ] Diabetic PVD Ulcer  [ x ] Neuropathic ulcer to DM  [  ] Diabetes with Nephropathy  [  ] Osteomyelitis due to diabetes  [  ] Hyperglycemia   [  ]hypoglycemia   --------------------------------------------------------------------  [  ]Malnutrition: See Nutrition Note  [  ]Cachexia  [  ]Other:   [  ]Supplement Ordered:  [  ]Morbid Obesity (BMI >=40]  ---------------------------------------------------------------------  [ ] Sepsis/severe sepsis/septic shock  [ ] Noninfectious SIRS  [ ] UTI  [ ] Pneumonia  -----------------------------------------------------------------------  [ ] Acidosis/alkalosis  [ ] Fluid overload  [ ] Hypokalemia  [ ] Hyperkalemia  [ ] Hypomagnesemia  [ ] Hypophosphatemia  [ ] Hyperphosphatemia  ------------------------------------------------------------------------  [ ] Acute blood loss anemia  [ ] Post op blood loss anemia  [ ] Iron deficiency anemia  [ ] Anemia due to chronic disease  [ ] Hypercoagulable state  [ ] Thrombocytopenia  ----------------------------------------------------------------------  [ ] Cerebral infarction  [ ] Transient ischemia attack  [ ] Encephalopathy - Toxic or Metabolic      47 y/o M pt with PMHx of DM, CAD, HTN, HLD  s/p amputation of R 3rd toe for gangrene  9/28/21, admitted w infection to amputation site. s/p R third toe metatarsal head removal and RLE angio with AT stent    Vascular/PAD:  -right 3rd toe amputation site with infection   - R third toe metatarsal head removal and RLE angio with AT stent  - wound vac to amputation  -Continue ASA+ plavix       HLD:  -c/w Atorvastatin 40    HTN:   - c/w lisinopril      CAD/CHF:   -Hx of CAD s/p stents at OSH   -Continue ASA  + Plavix and statin     DM:  -ISS   -Lantus at bedtime      ID:  -OR for infcted toe debridement  -Continue zosyn for now       DVT PPx: SQH     Dispo: home with vac today O/N: JACINTO FERNANDEZ  10/31: dc'ed vanc, wound vac leaking pathced up        SUBJECTIVE: Patient with no specific complaints. Ambulating to bathroom. Expressed desire to leave hospital today.       MEDICATIONS  (STANDING):  aspirin enteric coated 81 milliGRAM(s) Oral daily  atorvastatin 40 milliGRAM(s) Oral at bedtime  clopidogrel Tablet 75 milliGRAM(s) Oral daily  dextrose 40% Gel 15 Gram(s) Oral once  dextrose 5%. 1000 milliLiter(s) (50 mL/Hr) IV Continuous <Continuous>  dextrose 5%. 1000 milliLiter(s) (100 mL/Hr) IV Continuous <Continuous>  dextrose 50% Injectable 25 Gram(s) IV Push once  dextrose 50% Injectable 12.5 Gram(s) IV Push once  dextrose 50% Injectable 25 Gram(s) IV Push once  glucagon  Injectable 1 milliGRAM(s) IntraMuscular once  heparin   Injectable 5000 Unit(s) SubCutaneous every 8 hours  influenza   Vaccine 0.5 milliLiter(s) IntraMuscular once  insulin glargine Injectable (LANTUS) 44 Unit(s) SubCutaneous at bedtime  insulin lispro (ADMELOG) corrective regimen sliding scale   SubCutaneous Before meals and at bedtime  insulin lispro Injectable (ADMELOG) 8 Unit(s) SubCutaneous three times a day before meals  lisinopril 20 milliGRAM(s) Oral daily  piperacillin/tazobactam IVPB.. 3.375 Gram(s) IV Intermittent every 6 hours    MEDICATIONS  (PRN):      Vital Signs Last 24 Hrs  T(C): 36.2 (01 Nov 2021 04:26), Max: 36.8 (31 Oct 2021 18:00)  T(F): 97.2 (01 Nov 2021 04:26), Max: 98.2 (31 Oct 2021 18:00)  HR: 66 (01 Nov 2021 05:55) (62 - 72)  BP: 135/77 (01 Nov 2021 05:55) (113/57 - 159/79)  BP(mean): 112 (31 Oct 2021 16:51) (98 - 112)  RR: 18 (01 Nov 2021 05:55) (18 - 19)  SpO2: 96% (01 Nov 2021 05:55) (96% - 96%)    Physical Exam:  Constitutional: Resting in bed comfortably in bed  Respiratory: Non labored breathing, no respiratory distress  Cardiovascular: NSR  Extremities: R third toe amp site with vac in place, no LE edema, calfs soft and non tender  Vascular: R Tri DP/PT L palp DP.    I&O's Summary    31 Oct 2021 07:01  -  01 Nov 2021 07:00  --------------------------------------------------------  IN: 240 mL / OUT: 1325 mL / NET: -1085 mL        LABS:                        11.4   5.53  )-----------( 195      ( 01 Nov 2021 07:10 )             35.0     11-01    142  |  106  |  11  ----------------------------<  170<H>  4.2   |  28  |  0.75    Ca    9.2      01 Nov 2021 07:10  Phos  3.9     11-01  Mg     2.0     11-01          CAPILLARY BLOOD GLUCOSE      POCT Blood Glucose.: 152 mg/dL (01 Nov 2021 06:32)  POCT Blood Glucose.: 139 mg/dL (31 Oct 2021 22:01)  POCT Blood Glucose.: 216 mg/dL (31 Oct 2021 16:43)  POCT Blood Glucose.: 142 mg/dL (31 Oct 2021 11:40)        RADIOLOGY & ADDITIONAL STUDIES:      --------------------------------------------------------------------------  PLEASE CHECK WHEN PRESENT:     [  ]Heart Failure     [  ] Acute     [  ] Acute on Chronic     [  ] Chronic  -------------------------------------------------------------------     [  ]Diastolic [HFpEF]     [  ]Systolic [HFrEF]     [  ]Combined [HFpEF & HFrEF]  .................................................................................     [  ]Other:     [ ] Pulmonary Hypertension     [ ] Afib     [x ] Hypertensive Heart Disease  -------------------------------------------------------------------  [ ] Respiratory failure  [ ] Acute cor pulmonale  [ ] Asthma/COPD Exacerbation  [ ] Pleural effusion  [ ] Aspiration pneumonia  [ ] Obstructive Sleep Apnea  -------------------------------------------------------------------  [  ]ROSANNE     [  ]ATN     [  ]Reneal Medullary Necrosis     [  ]Renal Cortical Necrosis     [  ]Other Pathological Lesions:    [  ]CKD 1  [  ]CKD 2  [  ]CKD 3  [  ]CKD 4  [  ]CKD 5  [  ]Other  -------------------------------------------------------------------  [ x ]Diabetes  [  ] Diabetic PVD Ulcer  [ x ] Neuropathic ulcer to DM  [  ] Diabetes with Nephropathy  [  ] Osteomyelitis due to diabetes  [  ] Hyperglycemia   [  ]hypoglycemia   --------------------------------------------------------------------  [  ]Malnutrition: See Nutrition Note  [  ]Cachexia  [  ]Other:   [  ]Supplement Ordered:  [  ]Morbid Obesity (BMI >=40]  ---------------------------------------------------------------------  [ ] Sepsis/severe sepsis/septic shock  [ ] Noninfectious SIRS  [ ] UTI  [ ] Pneumonia  -----------------------------------------------------------------------  [ ] Acidosis/alkalosis  [ ] Fluid overload  [ ] Hypokalemia  [ ] Hyperkalemia  [ ] Hypomagnesemia  [ ] Hypophosphatemia  [ ] Hyperphosphatemia  ------------------------------------------------------------------------  [ ] Acute blood loss anemia  [ ] Post op blood loss anemia  [ ] Iron deficiency anemia  [ ] Anemia due to chronic disease  [ ] Hypercoagulable state  [ ] Thrombocytopenia  ----------------------------------------------------------------------  [ ] Cerebral infarction  [ ] Transient ischemia attack  [ ] Encephalopathy - Toxic or Metabolic      47 y/o M pt with PMHx of DM, CAD, HTN, HLD  s/p amputation of R 3rd toe for gangrene  9/28/21, admitted w infection to amputation site. s/p R third toe metatarsal head removal and RLE angio with AT stent    Vascular/PAD:  -right 3rd toe amputation site with infection   - R third toe metatarsal head removal and RLE angio with AT stent  - wound vac to amputation, will change to WTD to be DC  -Continue ASA+ plavix       HLD:  -c/w Atorvastatin 40    HTN:   - c/w lisinopril      CAD/CHF:   -Hx of CAD s/p stents at OSH   -Continue ASA  + Plavix and statin     DM:  -ISS   -Lantus at bedtime      ID:  -Continue zosyn for now, DC on PO abx, awaiting recs       DVT PPx: SQH     Dispo: home today O/N: JACINTO FERNANDEZ  10/31: dc'ed vanc, wound vac leaking pathced up        SUBJECTIVE: Patient with no specific complaints. Ambulating to bathroom. Expressed desire to leave hospital today.       MEDICATIONS  (STANDING):  aspirin enteric coated 81 milliGRAM(s) Oral daily  atorvastatin 40 milliGRAM(s) Oral at bedtime  clopidogrel Tablet 75 milliGRAM(s) Oral daily  dextrose 40% Gel 15 Gram(s) Oral once  dextrose 5%. 1000 milliLiter(s) (50 mL/Hr) IV Continuous <Continuous>  dextrose 5%. 1000 milliLiter(s) (100 mL/Hr) IV Continuous <Continuous>  dextrose 50% Injectable 25 Gram(s) IV Push once  dextrose 50% Injectable 12.5 Gram(s) IV Push once  dextrose 50% Injectable 25 Gram(s) IV Push once  glucagon  Injectable 1 milliGRAM(s) IntraMuscular once  heparin   Injectable 5000 Unit(s) SubCutaneous every 8 hours  influenza   Vaccine 0.5 milliLiter(s) IntraMuscular once  insulin glargine Injectable (LANTUS) 44 Unit(s) SubCutaneous at bedtime  insulin lispro (ADMELOG) corrective regimen sliding scale   SubCutaneous Before meals and at bedtime  insulin lispro Injectable (ADMELOG) 8 Unit(s) SubCutaneous three times a day before meals  lisinopril 20 milliGRAM(s) Oral daily  piperacillin/tazobactam IVPB.. 3.375 Gram(s) IV Intermittent every 6 hours    MEDICATIONS  (PRN):      Vital Signs Last 24 Hrs  T(C): 36.2 (01 Nov 2021 04:26), Max: 36.8 (31 Oct 2021 18:00)  T(F): 97.2 (01 Nov 2021 04:26), Max: 98.2 (31 Oct 2021 18:00)  HR: 66 (01 Nov 2021 05:55) (62 - 72)  BP: 135/77 (01 Nov 2021 05:55) (113/57 - 159/79)  BP(mean): 112 (31 Oct 2021 16:51) (98 - 112)  RR: 18 (01 Nov 2021 05:55) (18 - 19)  SpO2: 96% (01 Nov 2021 05:55) (96% - 96%)    Physical Exam:  Constitutional: Resting in bed comfortably in bed  Respiratory: Non labored breathing, no respiratory distress  Cardiovascular: NSR  Extremities: R third toe amp site with vac in place, no LE edema, calfs soft and non tender  Vascular: R Tri DP/PT L palp DP.    I&O's Summary    31 Oct 2021 07:01  -  01 Nov 2021 07:00  --------------------------------------------------------  IN: 240 mL / OUT: 1325 mL / NET: -1085 mL        LABS:                        11.4   5.53  )-----------( 195      ( 01 Nov 2021 07:10 )             35.0     11-01    142  |  106  |  11  ----------------------------<  170<H>  4.2   |  28  |  0.75    Ca    9.2      01 Nov 2021 07:10  Phos  3.9     11-01  Mg     2.0     11-01          CAPILLARY BLOOD GLUCOSE      POCT Blood Glucose.: 152 mg/dL (01 Nov 2021 06:32)  POCT Blood Glucose.: 139 mg/dL (31 Oct 2021 22:01)  POCT Blood Glucose.: 216 mg/dL (31 Oct 2021 16:43)  POCT Blood Glucose.: 142 mg/dL (31 Oct 2021 11:40)        RADIOLOGY & ADDITIONAL STUDIES:      --------------------------------------------------------------------------  PLEASE CHECK WHEN PRESENT:     [  ]Heart Failure     [  ] Acute     [  ] Acute on Chronic     [  ] Chronic  -------------------------------------------------------------------     [  ]Diastolic [HFpEF]     [  ]Systolic [HFrEF]     [  ]Combined [HFpEF & HFrEF]  .................................................................................     [  ]Other:     [ ] Pulmonary Hypertension     [ ] Afib     [x ] Hypertensive Heart Disease  -------------------------------------------------------------------  [ ] Respiratory failure  [ ] Acute cor pulmonale  [ ] Asthma/COPD Exacerbation  [ ] Pleural effusion  [ ] Aspiration pneumonia  [ ] Obstructive Sleep Apnea  -------------------------------------------------------------------  [  ]ROSANNE     [  ]ATN     [  ]Reneal Medullary Necrosis     [  ]Renal Cortical Necrosis     [  ]Other Pathological Lesions:    [  ]CKD 1  [  ]CKD 2  [  ]CKD 3  [  ]CKD 4  [  ]CKD 5  [  ]Other  -------------------------------------------------------------------  [ x ]Diabetes  [  ] Diabetic PVD Ulcer  [ x ] Neuropathic ulcer to DM  [  ] Diabetes with Nephropathy  [  ] Osteomyelitis due to diabetes  [  ] Hyperglycemia   [  ]hypoglycemia   --------------------------------------------------------------------  [  ]Malnutrition: See Nutrition Note  [  ]Cachexia  [  ]Other:   [  ]Supplement Ordered:  [  ]Morbid Obesity (BMI >=40]  ---------------------------------------------------------------------  [ ] Sepsis/severe sepsis/septic shock  [ ] Noninfectious SIRS  [ ] UTI  [ ] Pneumonia  -----------------------------------------------------------------------  [ ] Acidosis/alkalosis  [ ] Fluid overload  [ ] Hypokalemia  [ ] Hyperkalemia  [ ] Hypomagnesemia  [ ] Hypophosphatemia  [ ] Hyperphosphatemia  ------------------------------------------------------------------------  [ ] Acute blood loss anemia  [ ] Post op blood loss anemia  [ ] Iron deficiency anemia  [ ] Anemia due to chronic disease  [ ] Hypercoagulable state  [ ] Thrombocytopenia  ----------------------------------------------------------------------  [ ] Cerebral infarction  [ ] Transient ischemia attack  [ ] Encephalopathy - Toxic or Metabolic      45 y/o M pt with PMHx of DM, CAD, HTN, HLD  s/p amputation of R 3rd toe for gangrene  9/28/21, admitted w infection to amputation site. s/p R third toe metatarsal head removal and RLE angio with AT stent    Vascular/PAD:  -right 3rd toe amputation site with infection   - R third toe metatarsal head removal and RLE angio with AT stent  - wound vac to amputation, will change to WTD to be DC  -Continue ASA+ plavix       HLD:  -c/w Atorvastatin 40    HTN:   - c/w lisinopril      CAD/CHF:   -Hx of CAD s/p stents at OSH   -Continue ASA  + Plavix and statin     DM:  -ISS   -Lantus at bedtime      ID:  -Continue zosyn for now, DC on PO abx, awaiting recs       DVT PPx: SQH     Dispo: home today

## 2021-11-01 NOTE — PROGRESS NOTE ADULT - SUBJECTIVE AND OBJECTIVE BOX
Subjective/Interval events:  No acute overnight events. No fever, chills. Pain well controlled. Tolerating diet.    MEDICATIONS  (STANDING):  aspirin enteric coated 81 milliGRAM(s) Oral daily  atorvastatin 40 milliGRAM(s) Oral at bedtime  clopidogrel Tablet 75 milliGRAM(s) Oral daily  dextrose 40% Gel 15 Gram(s) Oral once  dextrose 5%. 1000 milliLiter(s) (50 mL/Hr) IV Continuous <Continuous>  dextrose 5%. 1000 milliLiter(s) (100 mL/Hr) IV Continuous <Continuous>  dextrose 50% Injectable 25 Gram(s) IV Push once  dextrose 50% Injectable 12.5 Gram(s) IV Push once  dextrose 50% Injectable 25 Gram(s) IV Push once  glucagon  Injectable 1 milliGRAM(s) IntraMuscular once  heparin   Injectable 5000 Unit(s) SubCutaneous every 8 hours  influenza   Vaccine 0.5 milliLiter(s) IntraMuscular once  insulin glargine Injectable (LANTUS) 44 Unit(s) SubCutaneous at bedtime  insulin lispro (ADMELOG) corrective regimen sliding scale   SubCutaneous Before meals and at bedtime  insulin lispro Injectable (ADMELOG) 8 Unit(s) SubCutaneous three times a day before meals  lisinopril 20 milliGRAM(s) Oral daily  piperacillin/tazobactam IVPB.. 3.375 Gram(s) IV Intermittent every 6 hours    MEDICATIONS  (PRN):    Vital Signs Last 24 Hrs  T(C): 36.7 (01 Nov 2021 10:23), Max: 36.8 (31 Oct 2021 18:00)  T(F): 98.1 (01 Nov 2021 10:23), Max: 98.2 (31 Oct 2021 18:00)  HR: 74 (01 Nov 2021 09:56) (62 - 74)  BP: 146/69 (01 Nov 2021 09:56) (113/57 - 159/79)  BP(mean): 112 (31 Oct 2021 16:51) (112 - 112)  RR: 16 (01 Nov 2021 09:56) (16 - 19)  SpO2: 96% (01 Nov 2021 09:56) (96% - 96%)    PHYSICAL EXAM:  GENERAL: pleasant, appropriate, no acute distress. Participating appropriately in interview  HEENT - NC/AT, EOMI, PERLLA, oropharynx clear without exudate or erythema, moist mucus membranes  NECK: Soft, supple, No JVD, no lymphadenopathy  CHEST/LUNG: Clear to auscultation bilaterally; No rales, rhonchi, wheezing. Normal work of breathing, not tachypneic  HEART: Regular rate and rhythm; No murmurs, rubs, or gallops, normal s1/s2. Warm and well-perfused  ABDOMEN: obese, soft, Nontender, Nondistended. Normoactive bowel sounds.  EXTREMITIES:  2+ Peripheral Pulses, No clubbing, cyanosis, or edema  NEURO:  awake, alert, oriented to person, place, time, and situation. Cranial nerves intact, no motor or sensory deficits. Moves all extremities.  SKIN: No rashes or lesions. R foot s/p toe amputation, overlying wound vac surrounding skin without erythema    LABS:  11-01    142  |  106  |  11  ----------------------------<  170<H>  4.2   |  28  |  0.75    Ca    9.2      01 Nov 2021 07:10  Phos  3.9     11-01  Mg     2.0     11-01                          11.4   5.53  )-----------( 195      ( 01 Nov 2021 07:10 )             35.0     RADIOLOGY & ADDITIONAL TESTS:  reviewed, none new

## 2021-11-01 NOTE — DISCHARGE NOTE NURSING/CASE MANAGEMENT/SOCIAL WORK - NSSCNAMETXT_GEN_ALL_CORE
[facility] at home Bedside RN please provide extra supplies for first dressing change at  home. Patient please call agency in AM to confirm time of Nurse visit.

## 2021-11-02 ENCOUNTER — APPOINTMENT (OUTPATIENT)
Dept: VASCULAR SURGERY | Facility: CLINIC | Age: 46
End: 2021-11-02

## 2021-11-03 LAB
-  CEFAZOLIN: SIGNIFICANT CHANGE UP
-  CLINDAMYCIN: SIGNIFICANT CHANGE UP
-  ERYTHROMYCIN: SIGNIFICANT CHANGE UP
-  LINEZOLID: SIGNIFICANT CHANGE UP
-  OXACILLIN: SIGNIFICANT CHANGE UP
-  RIFAMPIN: SIGNIFICANT CHANGE UP
-  TRIMETHOPRIM/SULFAMETHOXAZOLE: SIGNIFICANT CHANGE UP
-  VANCOMYCIN: SIGNIFICANT CHANGE UP
METHOD TYPE: SIGNIFICANT CHANGE UP

## 2021-11-04 LAB
-  AMOXICILLIN/CLAVULANIC ACID: SIGNIFICANT CHANGE UP
-  ERTAPENEM: SIGNIFICANT CHANGE UP
-  MEROPENEM: SIGNIFICANT CHANGE UP
-  PIPERACILLIN/TAZOBACTAM: SIGNIFICANT CHANGE UP
CULTURE RESULTS: SIGNIFICANT CHANGE UP
METHOD TYPE: SIGNIFICANT CHANGE UP
ORGANISM # SPEC MICROSCOPIC CNT: SIGNIFICANT CHANGE UP
SPECIMEN SOURCE: SIGNIFICANT CHANGE UP

## 2021-11-05 LAB
CULTURE RESULTS: SIGNIFICANT CHANGE UP
ORGANISM # SPEC MICROSCOPIC CNT: SIGNIFICANT CHANGE UP
ORGANISM # SPEC MICROSCOPIC CNT: SIGNIFICANT CHANGE UP
SPECIMEN SOURCE: SIGNIFICANT CHANGE UP

## 2021-11-09 ENCOUNTER — APPOINTMENT (OUTPATIENT)
Dept: VASCULAR SURGERY | Facility: CLINIC | Age: 46
End: 2021-11-09
Payer: COMMERCIAL

## 2021-11-09 PROCEDURE — 93926 LOWER EXTREMITY STUDY: CPT

## 2021-11-09 PROCEDURE — 99024 POSTOP FOLLOW-UP VISIT: CPT

## 2021-11-10 LAB — SURGICAL PATHOLOGY STUDY: SIGNIFICANT CHANGE UP

## 2021-11-11 NOTE — REASON FOR VISIT
[de-identified] : Right LE angiogram, AT stent and PT stent [de-identified] : 10/29/21 [de-identified] : 46 year old male with right third toe gangrene after stepping on a nail, poorly controlled DM. right third toe amputation on 9/29/21, metatarsal resection on 10/28/21 and angiogram PT/AT stent on 10/29/21. He went home with wound vac and nurse changing three times a week and has one more week or oral abx left (augmentin/bactrim) Denies fever, chills, drainage from wound, foul odor.

## 2021-11-11 NOTE — DISCUSSION/SUMMARY
[FreeTextEntry1] : 46 year old male with right third toe gangrene after stepping on a nail, poorly controlled DM. right third toe amputation on 9/29/21, metatarsal resection on 10/28/21 and angiogram PT/AT stent on 10/29/21. He went home with wound vac and nurse changing three times a week and has one more week or oral abx left (augmentin/bactrim) Wound clean and dry, appears to be healing well after procedure. Arterial US done showing patent stents\par \par Plan\par - Continue oral abx until finished\par - Continue wound vac three times a week\par - FU in 2 weeks for wound check\par \par

## 2021-11-11 NOTE — PHYSICAL EXAM
[Respiratory Effort] : normal respiratory effort [Normal Heart Sounds] : normal heart sounds [2+] : right 2+ [1+] : right 1+ [Alert] : alert [Oriented to Person] : oriented to person [Oriented to Place] : oriented to place [Oriented to Time] : oriented to time [Calm] : calm [Ankle Swelling (On Exam)] : not present [Varicose Veins Of Lower Extremities] : not present [] : not present [de-identified] : well appearing [de-identified] : third toe amp site macerated with some fibrin- stable, slightly imrpoved

## 2021-11-12 RX ORDER — LISINOPRIL 20 MG/1
20 TABLET ORAL DAILY
Qty: 90 | Refills: 1 | Status: ACTIVE | COMMUNITY
Start: 2020-10-08 | End: 1900-01-01

## 2021-11-18 ENCOUNTER — APPOINTMENT (OUTPATIENT)
Dept: HEART AND VASCULAR | Facility: CLINIC | Age: 46
End: 2021-11-18

## 2021-11-18 ENCOUNTER — APPOINTMENT (OUTPATIENT)
Dept: HEART AND VASCULAR | Facility: CLINIC | Age: 46
End: 2021-11-18
Payer: COMMERCIAL

## 2021-11-18 VITALS
OXYGEN SATURATION: 97 % | DIASTOLIC BLOOD PRESSURE: 70 MMHG | HEART RATE: 96 BPM | HEIGHT: 75 IN | SYSTOLIC BLOOD PRESSURE: 128 MMHG | TEMPERATURE: 97 F | WEIGHT: 253.99 LBS | BODY MASS INDEX: 31.58 KG/M2

## 2021-11-18 DIAGNOSIS — Z95.5 PRESENCE OF CORONARY ANGIOPLASTY IMPLANT AND GRAFT: ICD-10-CM

## 2021-11-18 DIAGNOSIS — K21.9 GASTRO-ESOPHAGEAL REFLUX DISEASE W/OUT ESOPHAGITIS: ICD-10-CM

## 2021-11-18 PROCEDURE — 93880 EXTRACRANIAL BILAT STUDY: CPT

## 2021-11-18 PROCEDURE — 99214 OFFICE O/P EST MOD 30 MIN: CPT | Mod: 25

## 2021-11-18 PROCEDURE — 93000 ELECTROCARDIOGRAM COMPLETE: CPT

## 2021-11-18 PROCEDURE — 36415 COLL VENOUS BLD VENIPUNCTURE: CPT

## 2021-11-18 NOTE — ASSESSMENT
[FreeTextEntry1] : Stable hemodynamics \par \par Stable CAD post PTCA/RENA , multivessel \par \par Right 3rd toe and metatarsal amputation post nonhealing wound  secondary to poor glycemic control and peripheral vascular disease requiring PTCA/RENA stenting of  LAZARO and PTA \par \par Type 2 diabetes , poorly controlled

## 2021-11-18 NOTE — PHYSICAL EXAM
[Well Developed] : well developed [Well Nourished] : well nourished [No Acute Distress] : no acute distress [Normal Conjunctiva] : normal conjunctiva [No Xanthelasma] : no xanthelasma [Normal Venous Pressure] : normal venous pressure [No Carotid Bruit] : no carotid bruit [Normal S1, S2] : normal S1, S2 [No Murmur] : no murmur [No Rub] : no rub [No Gallop] : no gallop [Clear Lung Fields] : clear lung fields [Good Air Entry] : good air entry [No Respiratory Distress] : no respiratory distress  [Soft] : abdomen soft [Non Tender] : non-tender [No Masses/organomegaly] : no masses/organomegaly [Normal Bowel Sounds] : normal bowel sounds [Abnormal Gait] : abnormal gait [No Edema] : no edema [No Cyanosis] : no cyanosis [No Clubbing] : no clubbing [No Varicosities] : no varicosities [No Rash] : no rash [No Skin Lesions] : no skin lesions [Moves all extremities] : moves all extremities [No Focal Deficits] : no focal deficits [Normal Speech] : normal speech [Alert and Oriented] : alert and oriented [Normal memory] : normal memory [de-identified] : BMI 32 [de-identified] : anicteric  [de-identified] : right foot wrapped  with orthopedic appliance for immobilization ,   interossei muscles atrophied  hands

## 2021-11-18 NOTE — HISTORY OF PRESENT ILLNESS
[FreeTextEntry1] : Denies personal hx of covid , he reports that he is vaccinated \par \par Reports  amputation of his 3rd right toe for gangrene  secondary to infection after stepping on a nail at work. Initial poor wound heeling required a metatarsal resection and  peripheral angiogram revealed  LAZARO mid stenosis ,with patent outflow  and occluded dorsal pedis  but reconstituted with collaterals  and posterior tibial  artery also had significant stenoses . \par \par Successful PTCA/RENA was performed  in the  anterior tibial and posterior tibial arteries and post procedure appears showed improved flow to the foot \par \par He has a wound vac  and completed  bactrim /metronidazole antibiotic therapy this past Monday. \par \par Wound nurse comes to the home every 3 days \par \par Patient denies fevers \par \par Blood sugars remain high but are improving with increased  Lantus dose \par \par Most recent A1c was 10.5% \par \par He denies chest pain, sustained palpitations, dyspnea , orthopnea\par \par EKG shows NSR 91 bpm   with chronic LAFB and VPCs  without ischemia or LVH

## 2021-11-18 NOTE — DISCUSSION/SUMMARY
[Coronary Artery Disease] : coronary artery disease [Outpatient Evaluation] : outpatient evaluation [Hyperlipidemia] : hyperlipidemia [Lipids Test Panel] : a fasting lipid profile [Known CAD] : known coronary artery disease [Diabetes Mellitus] : diabetes mellitus [Low HDL] : low HDL [<70] : goal is <70 [>40] : goal is >40 [At Goal] : The HDL is not at goal [Peripheral Vascular Disease] : peripheral vascular disease [Stable] : stable [Responding to Treatment] : responding to treatment [With Me] : with me [___ Month(s)] : in [unfilled] month(s) [de-identified] : continue DAPT without interruption    ,  will attempt to get auth for  PCSK-9 inhibitor  [FreeTextEntry1] : Venipuncture performed with A1c, lipids , ESR, etc \par \par Carotid duplex scan shows nonocclusive plaques   with patent antegrade vertebral arterial flows \par \par Elevate  right leg \par \par Reinforced diet \par \par Will attempt to acquire authorization for  PCSK-9 inhibitor \par \par Gut protection   with famotidine \par

## 2021-11-18 NOTE — REVIEW OF SYSTEMS
[Weight Gain (___ Lbs)] : [unfilled] ~Ulb weight gain [Heartburn] : heartburn [Negative] : Heme/Lymph [de-identified] : wound vac on right foot  amputation site ? photographs show clean wound

## 2021-11-18 NOTE — REASON FOR VISIT
[Symptom and Test Evaluation] : symptom and test evaluation [Coronary Artery Disease] : coronary artery disease [Carotid, Aortic and Peripheral Vascular Disease] : carotid, aortic and peripheral vascular disease [FreeTextEntry1] : 46   year old male  with poorly controlled type 2 diabetes , HTN  has LAFB on resting EKG and a family hx of premature CAD.    He is  s/p PTCA/RENA of LAD and RCA. Has normal LVEF. \par \par \par \par

## 2021-11-22 LAB
ALBUMIN SERPL ELPH-MCNC: 4.5 G/DL
ALP BLD-CCNC: 73 U/L
ALT SERPL-CCNC: 74 U/L
ANION GAP SERPL CALC-SCNC: 15 MMOL/L
AST SERPL-CCNC: 28 U/L
BASOPHILS # BLD AUTO: 0.04 K/UL
BASOPHILS NFR BLD AUTO: 0.3 %
BILIRUB SERPL-MCNC: 0.5 MG/DL
BUN SERPL-MCNC: 34 MG/DL
CALCIUM SERPL-MCNC: 9.8 MG/DL
CHLORIDE SERPL-SCNC: 96 MMOL/L
CHOLEST SERPL-MCNC: 116 MG/DL
CO2 SERPL-SCNC: 22 MMOL/L
COVID-19 NUCLEOCAPSID  GAM ANTIBODY INTERPRETATION: NEGATIVE
COVID-19 SPIKE DOMAIN ANTIBODY INTERPRETATION: POSITIVE
CREAT SERPL-MCNC: 0.8 MG/DL
EOSINOPHIL # BLD AUTO: 0.11 K/UL
EOSINOPHIL NFR BLD AUTO: 1 %
ERYTHROCYTE [SEDIMENTATION RATE] IN BLOOD BY WESTERGREN METHOD: 77 MM/HR
ESTIMATED AVERAGE GLUCOSE: 209 MG/DL
GLUCOSE SERPL-MCNC: 320 MG/DL
HBA1C MFR BLD HPLC: 8.9 %
HCT VFR BLD CALC: 36.5 %
HDLC SERPL-MCNC: 25 MG/DL
HGB BLD-MCNC: 11.5 G/DL
IMM GRANULOCYTES NFR BLD AUTO: 0.9 %
LDLC SERPL CALC-MCNC: 47 MG/DL
LYMPHOCYTES # BLD AUTO: 1.56 K/UL
LYMPHOCYTES NFR BLD AUTO: 13.5 %
MAN DIFF?: NORMAL
MCHC RBC-ENTMCNC: 30.7 PG
MCHC RBC-ENTMCNC: 31.5 GM/DL
MCV RBC AUTO: 97.3 FL
MONOCYTES # BLD AUTO: 0.77 K/UL
MONOCYTES NFR BLD AUTO: 6.7 %
NEUTROPHILS # BLD AUTO: 8.99 K/UL
NEUTROPHILS NFR BLD AUTO: 77.6 %
NONHDLC SERPL-MCNC: 92 MG/DL
PLATELET # BLD AUTO: 292 K/UL
POTASSIUM SERPL-SCNC: 5.2 MMOL/L
PROT SERPL-MCNC: 7.3 G/DL
RBC # BLD: 3.75 M/UL
RBC # FLD: 13.2 %
SARS-COV-2 AB SERPL IA-ACNC: 243 U/ML
SARS-COV-2 AB SERPL QL IA: 0.07 INDEX
SODIUM SERPL-SCNC: 132 MMOL/L
TRIGL SERPL-MCNC: 224 MG/DL
TSH SERPL-ACNC: 1.37 UIU/ML
WBC # FLD AUTO: 11.57 K/UL

## 2021-11-23 ENCOUNTER — OUTPATIENT (OUTPATIENT)
Dept: OUTPATIENT SERVICES | Facility: HOSPITAL | Age: 46
LOS: 1 days | End: 2021-11-23
Payer: COMMERCIAL

## 2021-11-23 ENCOUNTER — APPOINTMENT (OUTPATIENT)
Dept: VASCULAR SURGERY | Facility: CLINIC | Age: 46
End: 2021-11-23
Payer: COMMERCIAL

## 2021-11-23 ENCOUNTER — INPATIENT (INPATIENT)
Facility: HOSPITAL | Age: 46
LOS: 5 days | Discharge: HOME | DRG: 638 | End: 2021-11-29
Attending: SURGERY | Admitting: SURGERY
Payer: COMMERCIAL

## 2021-11-23 VITALS
WEIGHT: 251.11 LBS | DIASTOLIC BLOOD PRESSURE: 83 MMHG | HEART RATE: 88 BPM | OXYGEN SATURATION: 98 % | TEMPERATURE: 98 F | HEIGHT: 75 IN | RESPIRATION RATE: 18 BRPM | SYSTOLIC BLOOD PRESSURE: 130 MMHG

## 2021-11-23 DIAGNOSIS — Z89.421 ACQUIRED ABSENCE OF OTHER RIGHT TOE(S): Chronic | ICD-10-CM

## 2021-11-23 LAB
ALBUMIN SERPL ELPH-MCNC: 3.9 G/DL — SIGNIFICANT CHANGE UP (ref 3.3–5)
ALP SERPL-CCNC: 78 U/L — SIGNIFICANT CHANGE UP (ref 40–120)
ALT FLD-CCNC: 99 U/L — HIGH (ref 10–45)
ANION GAP SERPL CALC-SCNC: 13 MMOL/L — SIGNIFICANT CHANGE UP (ref 5–17)
APTT BLD: 28.1 SEC — SIGNIFICANT CHANGE UP (ref 27.5–35.5)
AST SERPL-CCNC: 33 U/L — SIGNIFICANT CHANGE UP (ref 10–40)
BASOPHILS # BLD AUTO: 0.04 K/UL — SIGNIFICANT CHANGE UP (ref 0–0.2)
BASOPHILS NFR BLD AUTO: 0.3 % — SIGNIFICANT CHANGE UP (ref 0–2)
BILIRUB SERPL-MCNC: 0.5 MG/DL — SIGNIFICANT CHANGE UP (ref 0.2–1.2)
BUN SERPL-MCNC: 24 MG/DL — HIGH (ref 7–23)
CALCIUM SERPL-MCNC: 9.5 MG/DL — SIGNIFICANT CHANGE UP (ref 8.4–10.5)
CHLORIDE SERPL-SCNC: 93 MMOL/L — LOW (ref 96–108)
CO2 SERPL-SCNC: 26 MMOL/L — SIGNIFICANT CHANGE UP (ref 22–31)
CREAT SERPL-MCNC: 0.73 MG/DL — SIGNIFICANT CHANGE UP (ref 0.5–1.3)
EOSINOPHIL # BLD AUTO: 0.16 K/UL — SIGNIFICANT CHANGE UP (ref 0–0.5)
EOSINOPHIL NFR BLD AUTO: 1.3 % — SIGNIFICANT CHANGE UP (ref 0–6)
GLUCOSE BLDC GLUCOMTR-MCNC: 330 MG/DL — HIGH (ref 70–99)
GLUCOSE SERPL-MCNC: 290 MG/DL — HIGH (ref 70–99)
GRAM STN FLD: SIGNIFICANT CHANGE UP
HCT VFR BLD CALC: 32.9 % — LOW (ref 39–50)
HGB BLD-MCNC: 10.6 G/DL — LOW (ref 13–17)
IMM GRANULOCYTES NFR BLD AUTO: 0.5 % — SIGNIFICANT CHANGE UP (ref 0–1.5)
INR BLD: 1.12 — SIGNIFICANT CHANGE UP (ref 0.88–1.16)
LACTATE SERPL-SCNC: 1.2 MMOL/L — SIGNIFICANT CHANGE UP (ref 0.5–2)
LYMPHOCYTES # BLD AUTO: 1.95 K/UL — SIGNIFICANT CHANGE UP (ref 1–3.3)
LYMPHOCYTES # BLD AUTO: 15.6 % — SIGNIFICANT CHANGE UP (ref 13–44)
MCHC RBC-ENTMCNC: 29.4 PG — SIGNIFICANT CHANGE UP (ref 27–34)
MCHC RBC-ENTMCNC: 32.2 GM/DL — SIGNIFICANT CHANGE UP (ref 32–36)
MCV RBC AUTO: 91.1 FL — SIGNIFICANT CHANGE UP (ref 80–100)
MONOCYTES # BLD AUTO: 1.02 K/UL — HIGH (ref 0–0.9)
MONOCYTES NFR BLD AUTO: 8.1 % — SIGNIFICANT CHANGE UP (ref 2–14)
NEUTROPHILS # BLD AUTO: 9.31 K/UL — HIGH (ref 1.8–7.4)
NEUTROPHILS NFR BLD AUTO: 74.2 % — SIGNIFICANT CHANGE UP (ref 43–77)
NRBC # BLD: 0 /100 WBCS — SIGNIFICANT CHANGE UP (ref 0–0)
PLATELET # BLD AUTO: 310 K/UL — SIGNIFICANT CHANGE UP (ref 150–400)
POTASSIUM SERPL-MCNC: 4.1 MMOL/L — SIGNIFICANT CHANGE UP (ref 3.5–5.3)
POTASSIUM SERPL-SCNC: 4.1 MMOL/L — SIGNIFICANT CHANGE UP (ref 3.5–5.3)
PROT SERPL-MCNC: 7.5 G/DL — SIGNIFICANT CHANGE UP (ref 6–8.3)
PROTHROM AB SERPL-ACNC: 13.4 SEC — SIGNIFICANT CHANGE UP (ref 10.6–13.6)
RBC # BLD: 3.61 M/UL — LOW (ref 4.2–5.8)
RBC # FLD: 12.4 % — SIGNIFICANT CHANGE UP (ref 10.3–14.5)
SARS-COV-2 RNA SPEC QL NAA+PROBE: NEGATIVE — SIGNIFICANT CHANGE UP
SODIUM SERPL-SCNC: 132 MMOL/L — LOW (ref 135–145)
SPECIMEN SOURCE: SIGNIFICANT CHANGE UP
WBC # BLD: 12.54 K/UL — HIGH (ref 3.8–10.5)
WBC # FLD AUTO: 12.54 K/UL — HIGH (ref 3.8–10.5)

## 2021-11-23 PROCEDURE — 36415 COLL VENOUS BLD VENIPUNCTURE: CPT

## 2021-11-23 PROCEDURE — 87181 SC STD AGAR DILUTION PER AGT: CPT

## 2021-11-23 PROCEDURE — 99024 POSTOP FOLLOW-UP VISIT: CPT

## 2021-11-23 PROCEDURE — 87186 SC STD MICRODIL/AGAR DIL: CPT

## 2021-11-23 PROCEDURE — 87075 CULTR BACTERIA EXCEPT BLOOD: CPT

## 2021-11-23 PROCEDURE — 73630 X-RAY EXAM OF FOOT: CPT | Mod: 26,RT

## 2021-11-23 PROCEDURE — 99285 EMERGENCY DEPT VISIT HI MDM: CPT

## 2021-11-23 PROCEDURE — 87184 SC STD DISK METHOD PER PLATE: CPT

## 2021-11-23 PROCEDURE — 87070 CULTURE OTHR SPECIMN AEROBIC: CPT

## 2021-11-23 RX ORDER — INSULIN LISPRO 100/ML
8 VIAL (ML) SUBCUTANEOUS
Refills: 0 | Status: DISCONTINUED | OUTPATIENT
Start: 2021-11-24 | End: 2021-11-29

## 2021-11-23 RX ORDER — ACETAMINOPHEN 500 MG
650 TABLET ORAL EVERY 6 HOURS
Refills: 0 | Status: DISCONTINUED | OUTPATIENT
Start: 2021-11-23 | End: 2021-11-29

## 2021-11-23 RX ORDER — INSULIN GLARGINE 100 [IU]/ML
44 INJECTION, SOLUTION SUBCUTANEOUS AT BEDTIME
Refills: 0 | Status: DISCONTINUED | OUTPATIENT
Start: 2021-11-23 | End: 2021-11-26

## 2021-11-23 RX ORDER — GLUCAGON INJECTION, SOLUTION 0.5 MG/.1ML
1 INJECTION, SOLUTION SUBCUTANEOUS ONCE
Refills: 0 | Status: DISCONTINUED | OUTPATIENT
Start: 2021-11-23 | End: 2021-11-29

## 2021-11-23 RX ORDER — DEXTROSE 50 % IN WATER 50 %
25 SYRINGE (ML) INTRAVENOUS ONCE
Refills: 0 | Status: DISCONTINUED | OUTPATIENT
Start: 2021-11-23 | End: 2021-11-29

## 2021-11-23 RX ORDER — HEPARIN SODIUM 5000 [USP'U]/ML
5000 INJECTION INTRAVENOUS; SUBCUTANEOUS EVERY 8 HOURS
Refills: 0 | Status: DISCONTINUED | OUTPATIENT
Start: 2021-11-23 | End: 2021-11-29

## 2021-11-23 RX ORDER — VANCOMYCIN HCL 1 G
1500 VIAL (EA) INTRAVENOUS EVERY 12 HOURS
Refills: 0 | Status: COMPLETED | OUTPATIENT
Start: 2021-11-24 | End: 2021-11-24

## 2021-11-23 RX ORDER — INSULIN LISPRO 100/ML
VIAL (ML) SUBCUTANEOUS
Refills: 0 | Status: DISCONTINUED | OUTPATIENT
Start: 2021-11-23 | End: 2021-11-29

## 2021-11-23 RX ORDER — ASCORBIC ACID 60 MG
500 TABLET,CHEWABLE ORAL DAILY
Refills: 0 | Status: DISCONTINUED | OUTPATIENT
Start: 2021-11-23 | End: 2021-11-29

## 2021-11-23 RX ORDER — INSULIN LISPRO 100/ML
14 VIAL (ML) SUBCUTANEOUS
Refills: 0 | Status: DISCONTINUED | OUTPATIENT
Start: 2021-11-24 | End: 2021-11-29

## 2021-11-23 RX ORDER — PIPERACILLIN AND TAZOBACTAM 4; .5 G/20ML; G/20ML
3.38 INJECTION, POWDER, LYOPHILIZED, FOR SOLUTION INTRAVENOUS ONCE
Refills: 0 | Status: COMPLETED | OUTPATIENT
Start: 2021-11-23 | End: 2021-11-23

## 2021-11-23 RX ORDER — SODIUM CHLORIDE 9 MG/ML
1000 INJECTION, SOLUTION INTRAVENOUS
Refills: 0 | Status: DISCONTINUED | OUTPATIENT
Start: 2021-11-23 | End: 2021-11-29

## 2021-11-23 RX ORDER — PIPERACILLIN AND TAZOBACTAM 4; .5 G/20ML; G/20ML
3.38 INJECTION, POWDER, LYOPHILIZED, FOR SOLUTION INTRAVENOUS EVERY 6 HOURS
Refills: 0 | Status: DISCONTINUED | OUTPATIENT
Start: 2021-11-24 | End: 2021-11-24

## 2021-11-23 RX ORDER — CLOPIDOGREL BISULFATE 75 MG/1
75 TABLET, FILM COATED ORAL DAILY
Refills: 0 | Status: DISCONTINUED | OUTPATIENT
Start: 2021-11-24 | End: 2021-11-29

## 2021-11-23 RX ORDER — ATORVASTATIN CALCIUM 80 MG/1
40 TABLET, FILM COATED ORAL AT BEDTIME
Refills: 0 | Status: DISCONTINUED | OUTPATIENT
Start: 2021-11-23 | End: 2021-11-29

## 2021-11-23 RX ORDER — ASPIRIN/CALCIUM CARB/MAGNESIUM 324 MG
81 TABLET ORAL DAILY
Refills: 0 | Status: DISCONTINUED | OUTPATIENT
Start: 2021-11-23 | End: 2021-11-29

## 2021-11-23 RX ORDER — DEXTROSE 50 % IN WATER 50 %
15 SYRINGE (ML) INTRAVENOUS ONCE
Refills: 0 | Status: DISCONTINUED | OUTPATIENT
Start: 2021-11-23 | End: 2021-11-29

## 2021-11-23 RX ORDER — METFORMIN HYDROCHLORIDE 850 MG/1
1 TABLET ORAL
Qty: 0 | Refills: 0 | DISCHARGE

## 2021-11-23 RX ORDER — VANCOMYCIN HCL 1 G
1500 VIAL (EA) INTRAVENOUS ONCE
Refills: 0 | Status: COMPLETED | OUTPATIENT
Start: 2021-11-23 | End: 2021-11-23

## 2021-11-23 RX ORDER — LISINOPRIL 2.5 MG/1
20 TABLET ORAL DAILY
Refills: 0 | Status: DISCONTINUED | OUTPATIENT
Start: 2021-11-23 | End: 2021-11-29

## 2021-11-23 RX ORDER — DEXTROSE 50 % IN WATER 50 %
12.5 SYRINGE (ML) INTRAVENOUS ONCE
Refills: 0 | Status: DISCONTINUED | OUTPATIENT
Start: 2021-11-23 | End: 2021-11-29

## 2021-11-23 RX ORDER — VANCOMYCIN HCL 1 G
1500 VIAL (EA) INTRAVENOUS ONCE
Refills: 0 | Status: DISCONTINUED | OUTPATIENT
Start: 2021-11-23 | End: 2021-11-23

## 2021-11-23 RX ADMIN — LISINOPRIL 20 MILLIGRAM(S): 2.5 TABLET ORAL at 23:54

## 2021-11-23 RX ADMIN — ATORVASTATIN CALCIUM 40 MILLIGRAM(S): 80 TABLET, FILM COATED ORAL at 23:45

## 2021-11-23 RX ADMIN — Medication 8: at 23:44

## 2021-11-23 RX ADMIN — PIPERACILLIN AND TAZOBACTAM 200 GRAM(S): 4; .5 INJECTION, POWDER, LYOPHILIZED, FOR SOLUTION INTRAVENOUS at 21:39

## 2021-11-23 RX ADMIN — Medication 300 MILLIGRAM(S): at 22:06

## 2021-11-23 RX ADMIN — HEPARIN SODIUM 5000 UNIT(S): 5000 INJECTION INTRAVENOUS; SUBCUTANEOUS at 23:45

## 2021-11-23 NOTE — ED ADULT TRIAGE NOTE - CHIEF COMPLAINT QUOTE
Pt presents to ED c/o wound check. hx of right 3rd toe amputation 5 weeks ago. States "I was sent in because it might be infected". Denies fevers, chills. Presents to ED with post-op shoe in place.

## 2021-11-23 NOTE — ED PROVIDER NOTE - MUSCULOSKELETAL, MLM
right foot  s/p 3rd toe amputation, wound open with minimal discharge, no pus, +erythema and swelling to dorsum of foot which does not extend proximally onto ankle

## 2021-11-23 NOTE — H&P ADULT - ASSESSMENT
45 y/o M pt with PMHx of DM, CAD, HTN, HLD, with most recently s/p amputation of R 3rd toe for gangrene after stepping on a nail 9/28/21 followed by 3rd toe ray amputation 10/28/21 and RLE angiogram with AT and PT stents on 10/29/21 he presents today with re-infection of the wound with right 4th toe cellulitis.     Vascular/PAD:  -Diabetic right foot infection  -WTD dressing  -f/u foot XR  -c/w ASA and Plavix    HTN/HLD:  -c/w lisinopril  -c/w atorvastatin    DM:  -ISS  -Lispro 8u in Am, 8u lunch and 14u w/dinner, lantus 44u at bedtime    ID:  -Vanco and Zosyn  -ID consult in Am    Diet: Diabetic diet    Activity: OOB as tolerated    DVTPPx: HSQ    Dispo: pending progress

## 2021-11-23 NOTE — H&P ADULT - NSHPPHYSICALEXAM_GEN_ALL_CORE
T(C): 36.8 (11-23-21 @ 19:27), Max: 36.8 (11-23-21 @ 19:27)  HR: 88 (11-23-21 @ 19:27) (88 - 88)  BP: 130/83 (11-23-21 @ 19:27) (130/83 - 130/83)  RR: 18 (11-23-21 @ 19:27) (18 - 18)  SpO2: 98% (11-23-21 @ 19:27) (98% - 98%)    CONSTITUTIONAL: Well groomed, no apparent distress    EYES: PERRLA and symmetric, EOMI, No conjunctival or scleral injection, non-icteric    RESPIRATORY: No respiratory distress, no use of accessory muscles    CARDIOVASCULAR: RRRR, +S1S2, no murmurs    GASTROINTESTINAL: Soft, non tender, non distended, no rebound, no guarding    MUSCULOSKELETAL: RLE: 3rd toe amputation site wound bed pink tissue with some fibrinous exudate, no purulent drainage, +surrounding erythema and edema including the 4th toe, no open ulceration, no crepitus.     VASCULAR: RLE: 2+FEM/POP/PT, 1+DP/triphasic DP LLE: 2+FEM/POP/DP/PT    PSYCHIATRIC: Appropriate insight/judgment; A+O x 3, mood and affect appropriate, recent/remote memory intact

## 2021-11-23 NOTE — ED ADULT TRIAGE NOTE - DOMESTIC TRAVEL HIGH RISK QUESTION
----- Message from Adrianne Hoffman sent at 1/26/2018 10:46 AM CST -----  Contact: [place] [phone]  Calling to get a copy of the Pt shot record. Parent stated they will pick the shot record up today @ noon.  Please call to confirm  --------  [place] [phone]   No

## 2021-11-23 NOTE — H&P ADULT - NSICDXPASTSURGICALHX_GEN_ALL_CORE_FT
PAST SURGICAL HISTORY:  History of complete ray amputation of third toe of right foot      Colchicine Counseling:  Patient counseled regarding adverse effects including but not limited to stomach upset (nausea, vomiting, stomach pain, or diarrhea).  Patient instructed to limit alcohol consumption while taking this medication.  Colchicine may reduce blood counts especially with prolonged use.  The patient understands that monitoring of kidney function and blood counts may be required, especially at baseline. The patient verbalized understanding of the proper use and possible adverse effects of colchicine.  All of the patient's questions and concerns were addressed.

## 2021-11-23 NOTE — ED PROVIDER NOTE - ATTENDING CONTRIBUTION TO CARE
I approve of the visit details performed / assessed by the Physician Assistant. The patient's visit details and orders are appropriate.    46 M with hx DM, CAD, HTN, HLD, recent amputation of R 3rd toe for gangrene discharged 11/1 with wound VAC, now with edema and erythema to 4th toe. Patient was sent from Dr. Cary office for admission and IV abx.   Patient HD stable.   Vascular surgery consulted.   Given vanc zosyn in ED.   Admitted to vascular surgery team.

## 2021-11-23 NOTE — H&P ADULT - HISTORY OF PRESENT ILLNESS
45 y/o M pt with PMHx of DM, CAD, HTN, HLD, with most recently s/p amputation of R 3rd toe for gangrene after stepping on a nail 9/28/21 followed by 3rd toe ray amputation 10/28/21 and RLE angiogram with AT and PT stents on 10/29/21 he presents today with new redness and swelling of right 4th toe.  He was discharged 11/1/21 with wound VAC to the amputation site which has been changed by visiting nurses 3x a week. Because of location of the wound VAC he has been getting increased irritation of the surrounding 4th toe along with skin blistering and swelling due to pressure from the tape. He was seen at Dr. Cary’s office today and noted to have erythema and edema of the 4th toe, he was referred to [facility] ED for admission and IV ABX.  Denies fever/chills, foot pain or wound drainage at home.

## 2021-11-23 NOTE — H&P ADULT - ATTENDING COMMENTS
Patient re-admitted from office with recurrent infection of right foot.  Purulence and foul smell.  Will begin IV abx.  Will plan for PICC line and 6 week course of abx.  Consult ID.

## 2021-11-23 NOTE — ED ADULT NURSE NOTE - CAS EDN DISCHARGE ASSESSMENT
Problem: Fall Injury Risk  Goal: Absence of Fall and Fall-Related Injury  Outcome: Ongoing, Progressing     Problem: Adult Inpatient Plan of Care  Goal: Plan of Care Review  Outcome: Ongoing, Progressing  Goal: Patient-Specific Goal (Individualization)  Outcome: Ongoing, Progressing  Goal: Absence of Hospital-Acquired Illness or Injury  Outcome: Ongoing, Progressing  Goal: Optimal Comfort and Wellbeing  Outcome: Ongoing, Progressing  Goal: Readiness for Transition of Care  Outcome: Ongoing, Progressing  Goal: Rounds/Family Conference  Outcome: Ongoing, Progressing     Problem: Diabetes Comorbidity  Goal: Blood Glucose Level Within Desired Range  Outcome: Ongoing, Progressing     Problem: Skin Injury Risk Increased  Goal: Skin Health and Integrity  Outcome: Ongoing, Progressing     Problem: Infection  Goal: Infection Symptom Resolution  Outcome: Ongoing, Progressing      Alert and oriented to person, place and time

## 2021-11-23 NOTE — ED PROVIDER NOTE - CLINICAL SUMMARY MEDICAL DECISION MAKING FREE TEXT BOX
45 y/o m hx DM, HTN, CAD, HLD, s/p right 3rd metatarsal head amputation on 10/28/21 with Dr. Cary presents c/o increased swelling, pain over the past few weeks.  Pt afebrile in ED, wbc 12, wound appears infected, pt failing oral abx outpatient.  Pt seen by vascular, given dose of vanc/zosyn in ED, will admit for IV abx

## 2021-11-23 NOTE — H&P ADULT - NSHPLABSRESULTS_GEN_ALL_CORE
10.6   12.54 )-----------( 310      ( 23 Nov 2021 20:47 )             32.9   11-23    132<L>  |  93<L>  |  24<H>  ----------------------------<  290<H>  4.1   |  26  |  0.73    Ca    9.5      23 Nov 2021 20:47    TPro  7.5  /  Alb  3.9  /  TBili  0.5  /  DBili  x   /  AST  33  /  ALT  99<H>  /  AlkPhos  78  11-23

## 2021-11-23 NOTE — ED PROVIDER NOTE - OBJECTIVE STATEMENT
47 y/o m hx DM, HTN, CAD, HLD, s/p right 3rd metatarsal head amputation on 10/28/21 with Dr. Cary presents c/o increased swelling, pain over the past few weeks.  Pt has been on bactrim, had a wound vac which was d/c yesterday, saw Dr. Cary in the office today who recommended he come to ED for IV antibiotics.  Pt reports sweats although has taken his temperature and hasn't measured a fever.  Denies numbness/tingling, weakness to ext, all other ROS negative.

## 2021-11-24 LAB
ANION GAP SERPL CALC-SCNC: 10 MMOL/L — SIGNIFICANT CHANGE UP (ref 5–17)
BUN SERPL-MCNC: 15 MG/DL — SIGNIFICANT CHANGE UP (ref 7–23)
CALCIUM SERPL-MCNC: 9.8 MG/DL — SIGNIFICANT CHANGE UP (ref 8.4–10.5)
CHLORIDE SERPL-SCNC: 99 MMOL/L — SIGNIFICANT CHANGE UP (ref 96–108)
CO2 SERPL-SCNC: 27 MMOL/L — SIGNIFICANT CHANGE UP (ref 22–31)
COVID-19 NUCLEOCAPSID GAM AB INTERP: NEGATIVE — SIGNIFICANT CHANGE UP
COVID-19 NUCLEOCAPSID TOTAL GAM ANTIBODY RESULT: 0.07 INDEX — SIGNIFICANT CHANGE UP
COVID-19 SPIKE DOMAIN AB INTERP: POSITIVE
COVID-19 SPIKE DOMAIN ANTIBODY RESULT: 218 U/ML — HIGH
CREAT SERPL-MCNC: 0.63 MG/DL — SIGNIFICANT CHANGE UP (ref 0.5–1.3)
GLUCOSE BLDC GLUCOMTR-MCNC: 110 MG/DL — HIGH (ref 70–99)
GLUCOSE BLDC GLUCOMTR-MCNC: 111 MG/DL — HIGH (ref 70–99)
GLUCOSE BLDC GLUCOMTR-MCNC: 120 MG/DL — HIGH (ref 70–99)
GLUCOSE BLDC GLUCOMTR-MCNC: 149 MG/DL — HIGH (ref 70–99)
GLUCOSE BLDC GLUCOMTR-MCNC: 169 MG/DL — HIGH (ref 70–99)
GLUCOSE BLDC GLUCOMTR-MCNC: 299 MG/DL — HIGH (ref 70–99)
GLUCOSE SERPL-MCNC: 145 MG/DL — HIGH (ref 70–99)
HCT VFR BLD CALC: 34.6 % — LOW (ref 39–50)
HGB BLD-MCNC: 11.1 G/DL — LOW (ref 13–17)
MAGNESIUM SERPL-MCNC: 2 MG/DL — SIGNIFICANT CHANGE UP (ref 1.6–2.6)
MCHC RBC-ENTMCNC: 29.9 PG — SIGNIFICANT CHANGE UP (ref 27–34)
MCHC RBC-ENTMCNC: 32.1 GM/DL — SIGNIFICANT CHANGE UP (ref 32–36)
MCV RBC AUTO: 93.3 FL — SIGNIFICANT CHANGE UP (ref 80–100)
NRBC # BLD: 0 /100 WBCS — SIGNIFICANT CHANGE UP (ref 0–0)
PHOSPHATE SERPL-MCNC: 3.8 MG/DL — SIGNIFICANT CHANGE UP (ref 2.5–4.5)
PLATELET # BLD AUTO: 329 K/UL — SIGNIFICANT CHANGE UP (ref 150–400)
POTASSIUM SERPL-MCNC: 4.6 MMOL/L — SIGNIFICANT CHANGE UP (ref 3.5–5.3)
POTASSIUM SERPL-SCNC: 4.6 MMOL/L — SIGNIFICANT CHANGE UP (ref 3.5–5.3)
RBC # BLD: 3.71 M/UL — LOW (ref 4.2–5.8)
RBC # FLD: 12.4 % — SIGNIFICANT CHANGE UP (ref 10.3–14.5)
SARS-COV-2 IGG+IGM SERPL QL IA: 0.07 INDEX — SIGNIFICANT CHANGE UP
SARS-COV-2 IGG+IGM SERPL QL IA: 218 U/ML — HIGH
SARS-COV-2 IGG+IGM SERPL QL IA: NEGATIVE — SIGNIFICANT CHANGE UP
SARS-COV-2 IGG+IGM SERPL QL IA: POSITIVE
SODIUM SERPL-SCNC: 136 MMOL/L — SIGNIFICANT CHANGE UP (ref 135–145)
WBC # BLD: 11.26 K/UL — HIGH (ref 3.8–10.5)
WBC # FLD AUTO: 11.26 K/UL — HIGH (ref 3.8–10.5)

## 2021-11-24 PROCEDURE — 36569 INSJ PICC 5 YR+ W/O IMAGING: CPT

## 2021-11-24 PROCEDURE — 99232 SBSQ HOSP IP/OBS MODERATE 35: CPT | Mod: 25

## 2021-11-24 PROCEDURE — 76937 US GUIDE VASCULAR ACCESS: CPT | Mod: 26,59

## 2021-11-24 PROCEDURE — 99222 1ST HOSP IP/OBS MODERATE 55: CPT

## 2021-11-24 PROCEDURE — 99233 SBSQ HOSP IP/OBS HIGH 50: CPT

## 2021-11-24 RX ORDER — PIPERACILLIN AND TAZOBACTAM 4; .5 G/20ML; G/20ML
4.5 INJECTION, POWDER, LYOPHILIZED, FOR SOLUTION INTRAVENOUS EVERY 6 HOURS
Refills: 0 | Status: DISCONTINUED | OUTPATIENT
Start: 2021-11-24 | End: 2021-11-26

## 2021-11-24 RX ORDER — LANOLIN ALCOHOL/MO/W.PET/CERES
3 CREAM (GRAM) TOPICAL AT BEDTIME
Refills: 0 | Status: DISCONTINUED | OUTPATIENT
Start: 2021-11-24 | End: 2021-11-29

## 2021-11-24 RX ORDER — CHLORHEXIDINE GLUCONATE 213 G/1000ML
1 SOLUTION TOPICAL
Refills: 0 | Status: DISCONTINUED | OUTPATIENT
Start: 2021-11-24 | End: 2021-11-29

## 2021-11-24 RX ORDER — SODIUM CHLORIDE 9 MG/ML
10 INJECTION INTRAMUSCULAR; INTRAVENOUS; SUBCUTANEOUS
Refills: 0 | Status: DISCONTINUED | OUTPATIENT
Start: 2021-11-24 | End: 2021-11-29

## 2021-11-24 RX ADMIN — INSULIN GLARGINE 44 UNIT(S): 100 INJECTION, SOLUTION SUBCUTANEOUS at 00:40

## 2021-11-24 RX ADMIN — HEPARIN SODIUM 5000 UNIT(S): 5000 INJECTION INTRAVENOUS; SUBCUTANEOUS at 13:26

## 2021-11-24 RX ADMIN — Medication 650 MILLIGRAM(S): at 21:16

## 2021-11-24 RX ADMIN — HEPARIN SODIUM 5000 UNIT(S): 5000 INJECTION INTRAVENOUS; SUBCUTANEOUS at 05:42

## 2021-11-24 RX ADMIN — Medication 3 MILLIGRAM(S): at 00:40

## 2021-11-24 RX ADMIN — PIPERACILLIN AND TAZOBACTAM 200 GRAM(S): 4; .5 INJECTION, POWDER, LYOPHILIZED, FOR SOLUTION INTRAVENOUS at 12:01

## 2021-11-24 RX ADMIN — INSULIN GLARGINE 44 UNIT(S): 100 INJECTION, SOLUTION SUBCUTANEOUS at 21:51

## 2021-11-24 RX ADMIN — ATORVASTATIN CALCIUM 40 MILLIGRAM(S): 80 TABLET, FILM COATED ORAL at 21:51

## 2021-11-24 RX ADMIN — Medication 1 TABLET(S): at 12:02

## 2021-11-24 RX ADMIN — PIPERACILLIN AND TAZOBACTAM 200 GRAM(S): 4; .5 INJECTION, POWDER, LYOPHILIZED, FOR SOLUTION INTRAVENOUS at 03:07

## 2021-11-24 RX ADMIN — Medication 8 UNIT(S): at 07:56

## 2021-11-24 RX ADMIN — Medication 14 UNIT(S): at 17:18

## 2021-11-24 RX ADMIN — PIPERACILLIN AND TAZOBACTAM 200 GRAM(S): 4; .5 INJECTION, POWDER, LYOPHILIZED, FOR SOLUTION INTRAVENOUS at 17:18

## 2021-11-24 RX ADMIN — Medication 81 MILLIGRAM(S): at 12:01

## 2021-11-24 RX ADMIN — Medication 8 UNIT(S): at 12:22

## 2021-11-24 RX ADMIN — Medication 300 MILLIGRAM(S): at 12:01

## 2021-11-24 RX ADMIN — Medication 300 MILLIGRAM(S): at 23:13

## 2021-11-24 RX ADMIN — HEPARIN SODIUM 5000 UNIT(S): 5000 INJECTION INTRAVENOUS; SUBCUTANEOUS at 21:51

## 2021-11-24 RX ADMIN — LISINOPRIL 20 MILLIGRAM(S): 2.5 TABLET ORAL at 05:42

## 2021-11-24 RX ADMIN — Medication 650 MILLIGRAM(S): at 20:16

## 2021-11-24 RX ADMIN — Medication 500 MILLIGRAM(S): at 12:02

## 2021-11-24 RX ADMIN — CLOPIDOGREL BISULFATE 75 MILLIGRAM(S): 75 TABLET, FILM COATED ORAL at 12:01

## 2021-11-24 NOTE — DIETITIAN INITIAL EVALUATION ADULT. - ADD RECOMMEND
1. Monitor PO intake/appetite, GI distress, diet tolerance, labs, weights.  2. Honor pt food preferences as able.  3. Cont with MVI/VitC for Healing. 4. Consider Endo F/u for insulin adjustments PRN. 4. RD to remain available for additional nutrition interventions as needed.

## 2021-11-24 NOTE — CONSULT NOTE ADULT - CONSULT REASON
Start wean down on effexor  Take 2 tablets daily for 2 weeks, then go to 1 tablet daily for 2 weeks.   Referral signed to see psychiatry.  They will be able to help you with getting on the right medication.         R foot OM vs cellulitis

## 2021-11-24 NOTE — DIETITIAN INITIAL EVALUATION ADULT. - PERTINENT MEDS FT
Plavix ecotrin heparin   vanco zosyn   pre meal lunch/breakfast 8units, premeal dinner 14units   bedtime lantus 44units  corrective sliding scale   Vit C  Lipitor  MVI

## 2021-11-24 NOTE — CONSULT NOTE ADULT - ASSESSMENT
46YOM with history of obesity (BMI 31.4), insulin-dependent type 2 diabetes (a1c Sept 2021 11.5%, started on insulin at that time), PAD, essential HTN, CAD s/p PCI, HLD, recent R 3rd toe amputation complicated by surgical site infection with wound vac placed in October, now admitted to vascular surgery service for wound infection.    R 3rd toe diabetic foot infection  Peripheral arterial disease  Initial injury occurred after stepping on nail at work. s/p amputation of R 3rd toe 9/29, though has had issues with postoperative surgical site infection in October which was debrided. Now admitted with cellulitis in R foot around area of wound VAC site.  -on vanco/zosyn for now; ID consulted per primary team  -ASA/statin/plavix    Insulin dependent type 2 diabetes  Hgb a1c 11.5% Sept 2021 - started on insulin at that time. Follows with Dr. Linder. Home meds include metformin, Lantus 44U qhs, lispro 14U with breakfast, 8U with lunch and dinner  -home metformin on hold  -continue home insulin dose  -antibiotics in NS not D5  -FSBG qac/qhs with SSI    Essential hypertension  Takes lisinopril 20mg once daily at home. BP at goal  -continue home meds    CAD s/p PCI - continue home ASA/statin/plavix  Hyperlipidemia - continue home statin  Obesity - BMI is 31.4, affects all aspects of care     Dispo: pending clinical improvement and antibiotic plan.

## 2021-11-24 NOTE — DIETITIAN INITIAL EVALUATION ADULT. - OTHER CALCULATIONS
%BHJ=326; IBW used to calculate energy needs due to pt's current body weight exceeding 120% of IBW; adjusted for age and wound healing

## 2021-11-24 NOTE — CONSULT NOTE ADULT - ASSESSMENT
46M h/o uncontrolled DM c/b 3rd toe gangrene s/p 3rd toe amputation on 9/29/21 c/b recurrent infection s/p 3rd MT head amputation on 10/28/21 with RLE angio w/ AT and PT stents on 10/29/21 p/w increased drainage from the surgical site and erythema involving R 4th toe.  Patient failed PO abx course twice already with worsening wound and new cellilitis (was not on optimal abx choice though) and MSSA and Prevotella grew from MT head margin, c/f residual OM.  Case d/w Dr. Cary, patient needs 6 weeks of IV abx therapy to treat OM.  Based on prior 10/28 culture, I suspect major causative organism is MSSA.  However given new erythema development, will cover broadly while pending WCx (superficial though).    - cont vancomycin 1500mg IV q12h, check trough before 4th dose, goal 13-17  - increase zosyn to 4.5g IV q6h  - f/u WCx   - OK to put PICC  - anticipate 6 weeks of IV abx    Team 2 will follow you.  Dr. Walter will take over the care tomorrow.  Case d/w primary team.    Angela Yuen MD, MS  Infectious Disease attending  work cell [phone]   For any questions during evening/weekend/holiday, please page ID on call

## 2021-11-24 NOTE — PROGRESS NOTE ADULT - SUBJECTIVE AND OBJECTIVE BOX
O/N: admitted for R DM foot infection, started on vanco and zosyn          ---------------------------------------------------------------------------  PLEASE CHECK WHEN PRESENT:  [  ] Heart Failure  [  ] Acute  [  ] Acute on Chronic  [  ] Chronic  -------------------------------------------------------------------  [  ]Diastolic [HFpEF]  [  ]Systolic [HFrEF]  [  ]Combined [HFpEF & HFrEF]  [  ] afib  [x] hypertensive heart disease  [  ]Other:  -------------------------------------------------------------------  [ ] Respiratory failure  [ ] Acute cor pulmonale  [ ] Asthma/COPD Exacerbation  [ ] Pleural effusion  [ ] Aspiration pneumonia  -------------------------------------------------------------------  [  ]ROSANNE  [  ]ATN  [  ]Reneal Medullary Necrosis  [  ]Renal Cortical Necrosis  [  ]Other Pathological Lesions:    [  ]CKD 1  [  ]CKD 2  [  ]CKD 3  [  ]CKD 4  [  ]CKD 5  [  ]Other  -------------------------------------------------------------------  [x]Diabetes  [x] Diabetic PVD Ulcer  [  ] Neuropathic ulcer to DM  [  ] Diabetes with Nephropathy  [  ] Osteomyelitis due to diabetes  --------------------------------------------------------------------  [  ]Malnutrition: See Nutrition Note  [  ]Cachexia  [  ]Other:   [  ]Supplement Ordered:  [  ]Morbid Obesity (BMI >=40]  ---------------------------------------------------------------------  [ ] Sepsis/severe sepsis/septic shock  [ ] UTI  [ ] Pneumonia  -----------------------------------------------------------------------  [ ] Acidosis/alkalosis  [ ] Fluid overload  [ ] Hypokalemia  [ ] Hyperkalemia  [ ] Hypomagnesemia  [ ] Hypophosphatemia  [ ] Hyperphosphatemia  ------------------------------------------------------------------------  [ ] Acute blood loss anemia  [ ] Post op blood loss anemia  [ ] Iron deficiency anemia  [ ] Anemia due to chronic disease  [ ] Hypercoagulable state  ----------------------------------------------------------------------  [ ] Cerebral infarction  [ ] Transient ischemia attack  [ ] Encephalopathy      47 y/o M pt with PMHx of DM, CAD, HTN, HLD, with most recently s/p amputation of R 3rd toe for gangrene after stepping on a nail 9/28/21 followed by 3rd toe ray amputation 10/28/21 and RLE angiogram with AT and PT stents on 10/29/21 he presents today with re-infection of the wound with right 4th toe cellulitis.     Vascular/PAD:  -Diabetic right foot infection  -WTD dressing  -f/u foot XR  -c/w ASA and Plavix    HTN/HLD:  -c/w lisinopril  -c/w atorvastatin    DM:  -ISS  -Lispro 8u in Am, 8u lunch and 14u w/dinner, lantus 44u at bedtime    ID:  -Vanco and Zosyn  -ID consult in Am    Diet: Diabetic diet    Activity: OOB as tolerated    DVTPPx: HSQ    Dispo: pending cultures, clinical improvement O/N: admitted for R DM foot infection, started on vanco and zosyn    Subjective: Denies CP, SOB, admits to slight tenderness in right foot with dressing change but denies pain at rest       ROS:   Denies Headache, blurred vision, Chest Pain, SOB, Abdominal pain, nausea or vomiting     Social   piperacillin/tazobactam IVPB.. 3.375  vancomycin  IVPB 1500  aspirin enteric coated 81  clopidogrel Tablet 75  heparin   Injectable 5000  lisinopril 20  piperacillin/tazobactam IVPB.. 3.375  vancomycin  IVPB 1500      Allergies    No Known Allergies    Intolerances        Vital Signs Last 24 Hrs  T(C): 36.7 (24 Nov 2021 06:08), Max: 37.5 (24 Nov 2021 00:18)  T(F): 98.1 (24 Nov 2021 06:08), Max: 99.5 (24 Nov 2021 00:18)  HR: 78 (24 Nov 2021 08:55) (78 - 88)  BP: 112/61 (24 Nov 2021 08:55) (112/61 - 139/75)  BP(mean): --  RR: 18 (24 Nov 2021 08:55) (18 - 18)  SpO2: 94% (24 Nov 2021 08:55) (94% - 98%)  I&O's Summary    23 Nov 2021 07:01  -  24 Nov 2021 07:00  --------------------------------------------------------  IN: 120 mL / OUT: 0 mL / NET: 120 mL        Physical Exam:  General: NAD  Pulmonary: b/l breath sounds   Cardiovascular:s1 s2 Reg  Abdominal: soft  Extremities: rt 3rd toe amp site healing well  4th toe with erythema and swelling   no right midfoot swelling or tenderness   DP pulse palpable         LABS:                        11.1   11.26 )-----------( 329      ( 24 Nov 2021 08:27 )             34.6     11-24    136  |  99  |  15  ----------------------------<  145<H>  4.6   |  27  |  0.63    Ca    9.8      24 Nov 2021 08:27  Phos  3.8     11-24  Mg     2.0     11-24    TPro  7.5  /  Alb  3.9  /  TBili  0.5  /  DBili  x   /  AST  33  /  ALT  99<H>  /  AlkPhos  78  11-23    PT/INR - ( 23 Nov 2021 20:47 )   PT: 13.4 sec;   INR: 1.12          PTT - ( 23 Nov 2021 20:47 )  PTT:28.1 sec    Radiology and Additional Studies:          ---------------------------------------------------------------------------  PLEASE CHECK WHEN PRESENT:  [  ] Heart Failure  [  ] Acute  [  ] Acute on Chronic  [  ] Chronic  -------------------------------------------------------------------  [  ]Diastolic [HFpEF]  [  ]Systolic [HFrEF]  [  ]Combined [HFpEF & HFrEF]  [  ] afib  [x] hypertensive heart disease  [  ]Other:  -------------------------------------------------------------------  [ ] Respiratory failure  [ ] Acute cor pulmonale  [ ] Asthma/COPD Exacerbation  [ ] Pleural effusion  [ ] Aspiration pneumonia  -------------------------------------------------------------------  [  ]ROSANNE  [  ]ATN  [  ]Reneal Medullary Necrosis  [  ]Renal Cortical Necrosis  [  ]Other Pathological Lesions:    [  ]CKD 1  [  ]CKD 2  [  ]CKD 3  [  ]CKD 4  [  ]CKD 5  [  ]Other  -------------------------------------------------------------------  [x]Diabetes  [x] Diabetic PVD Ulcer  [  ] Neuropathic ulcer to DM  [  ] Diabetes with Nephropathy  [  ] Osteomyelitis due to diabetes  --------------------------------------------------------------------  [  ]Malnutrition: See Nutrition Note  [  ]Cachexia  [  ]Other:   [  ]Supplement Ordered:  [  ]Morbid Obesity (BMI >=40]  ---------------------------------------------------------------------  [ ] Sepsis/severe sepsis/septic shock  [ ] UTI  [ ] Pneumonia  -----------------------------------------------------------------------  [ ] Acidosis/alkalosis  [ ] Fluid overload  [ ] Hypokalemia  [ ] Hyperkalemia  [ ] Hypomagnesemia  [ ] Hypophosphatemia  [ ] Hyperphosphatemia  ------------------------------------------------------------------------  [ ] Acute blood loss anemia  [ ] Post op blood loss anemia  [ ] Iron deficiency anemia  [ ] Anemia due to chronic disease  [ ] Hypercoagulable state  ----------------------------------------------------------------------  [ ] Cerebral infarction  [ ] Transient ischemia attack  [ ] Encephalopathy      45 y/o M pt with PMHx of DM, CAD, HTN, HLD, with most recently s/p amputation of R 3rd toe for gangrene after stepping on a nail 9/28/21 followed by 3rd toe ray amputation 10/28/21 and RLE angiogram with AT and PT stents on 10/29/21 he presents today with re-infection of the wound with right 4th toe cellulitis.     Vascular/PAD:  -Diabetic right foot infection  -WTD dressing  -f/u foot XR  -c/w ASA and Plavix    HTN/HLD:  -c/w lisinopril  -c/w atorvastatin    DM:  -ISS  -Lispro 8u in Am, 8u lunch and 14u w/dinner, lantus 44u at bedtime    ID:  -Vanco and Zosyn  -ID consult in Am    Diet: Diabetic diet    Activity: OOB as tolerated    DVTPPx: HSQ    Dispo: pending cultures, clinical improvement

## 2021-11-24 NOTE — DIETITIAN INITIAL EVALUATION ADULT. - NAME AND PHONE
Pt would benefit from further Medical Nutrition Therapy after discharge. Recommend to follow up with [facility] for continuity of care. Email [email or web address] or call [phone].

## 2021-11-24 NOTE — CONSULT NOTE ADULT - SUBJECTIVE AND OBJECTIVE BOX
Vascular Access Service Consult Note    46yMaleHEALTH ISSUES - PROBLEM Dx:             Diagnosis: osteomyelitis     Indications for Vascular Access (Check all that apply)  [ X ]  Antibiotic Therapy       Antibiotic Prescribed:  vanc/zosyn x 6 weeks pending ID recs                                                                                [  ]  IV Hydration  [  ]  Total Parenteral Nutrition  [  ]  Chemotherapy  [  ]  Difficult Venous Access  [  ]  CVP monitoring  [  ]  Medications with high potential for tissue necrosis on extravasation  [  ]  Other    Screening (Check all that apply)  Previous Radiation to chest  [  ] Yes      [ X ]  No  Breast Cancer                          [  ] Left     [  ]  Right    [ X ]  No  Lymph Node Dissection         [  ] Left     [  ]  Right    [ X ]  No  Pacemaker or ICD                   [  ] Left     [  ]  Right    [ X ]  No  Upper Extremity DVT             [  ] Left     [  ]  Right    [X  ]  No  Chronic Kidney Disease         [  ]  Yes     X[  ]  No  Hemodialysis                           [  ]  Yes     [X  ]  No  AV Fistula/ Graft                     [  ]  Left    [  ]  Right    [X  ]  No  Temp>101F in past 24 H       [  ]  Yes     [ X ]  No  H/O PICC/Midline                   [  ]  Yes     [ X ]  No    Lab data:                        11.1   11.26 )-----------( 329      ( 24 Nov 2021 08:27 )             34.6     11-24    136  |  99  |  15  ----------------------------<  145<H>  4.6   |  27  |  0.63    Ca    9.8      24 Nov 2021 08:27  Phos  3.8     11-24  Mg     2.0     11-24    TPro  7.5  /  Alb  3.9  /  TBili  0.5  /  DBili  x   /  AST  33  /  ALT  99<H>  /  AlkPhos  78  11-23    PT/INR - ( 23 Nov 2021 20:47 )   PT: 13.4 sec;   INR: 1.12          PTT - ( 23 Nov 2021 20:47 )  PTT:28.1 sec  Culture Results:   Culture in progress (11-23 @ 17:46)          I have reviewed the chart, interviewed and examined the patient and determined that this patient:  [ X ] Is a candidate for a PICC line  [  ] Is a candidate for a Midline  [  ] Is not a candidate for vascular access device (reason)    Lumens:    [  ] Single  [ X ] Double
CC: R foot infection    HPI:  Per admission H&P:  "47 y/o M pt with PMHx of DM, CAD, HTN, HLD, with most recently s/p amputation of R 3rd toe for gangrene after stepping on a nail 9/28/21 followed by 3rd toe ray amputation 10/28/21 and RLE angiogram with AT and PT stents on 10/29/21 he presents today with new redness and swelling of right 4th toe.  He was discharged 11/1/21 with wound VAC to the amputation site which has been changed by visiting nurses 3x a week. Because of location of the wound VAC he has been getting increased irritation of the surrounding 4th toe along with skin blistering and swelling due to pressure from the tape. He was seen at Dr. Cary’s office today and noted to have erythema and edema of the 4th toe, he was referred to [facility] ED for admission and IV ABX.  Denies fever/chills, foot pain or wound drainage at home. "    Patient admitted to vascular surgery service, started on broad spectrum antibiotics. Seen by me this morning. He confirms above history. He says pain/tightness in foot is a little better today than yesterday. Denies fever/chills, no dizziness/lightheadedness. No drainage from foot.    12 point ROS reviewed and negative except as otherwise stated in HPI and below    PAST MEDICAL & SURGICAL HISTORY:  Type II diabetes mellitus    Hyperlipidemia    Hypertension    Coronary artery disease    History of complete ray amputation of third toe of right foot      SOCIAL HISTORY:  Tobacco: quit a few months ago, still smoking occasionally though  Alcohol: denies  Illicit Drugs: denies  Living situation: with wife and two kids  ADLs: independent  Works in construction management    FAMILY HISTORY:  Mother's side all has DM, including his mother and brother  Father had MI  ALLERGIES:  No Known Allergies      HOME MEDICATIONS:  atorvastatin 40 mg oral tablet: 1 tab(s) orally once a day  HumaLOG 100 units/mL injectable solution: 8 unit(s) subcutaneous 3 times a day (before meals)  Lantus 100 units/mL subcutaneous solution: 44 unit(s) subcutaneous once a day (at bedtime)  lisinopril 20 mg oral tablet: 1 tab(s) orally once a day  metFORMIN 1000 mg oral tablet: 1 tab(s) orally 2 times a day      Vital Signs Last 24 Hrs  T(F): 98.8 (24 Nov 2021 10:04), Max: 99.5 (24 Nov 2021 00:18)  HR: 78 (24 Nov 2021 08:55) (78 - 88)  BP: 112/61 (24 Nov 2021 08:55) (112/61 - 139/75)  RR: 18 (24 Nov 2021 08:55) (18 - 18)  SpO2: 94% (24 Nov 2021 08:55) (94% - 98%)    PHYSICAL EXAM:  GENERAL: pleasant, appropriate, no acute distress, well-groomed, well-developed  HEAD:  Atraumatic, Normocephalic  EYES: EOMI, PERRLA, conjunctiva and sclera clear  ENMT: No tonsillar erythema, exudates, or enlargement; Moist mucous membranes, Good dentition  NECK: Supple, No JVD  CHEST/LUNG: Clear to auscultation bilaterally; No rales, rhonchi, wheezing, or rubs  HEART: Regular rate and rhythm; S1/S2, No murmurs, rubs, or gallops  ABDOMEN: obese, soft, Nontender, Nondistended; Bowel sounds present  VASCULAR: Normal pulses, Normal capillary refill  EXTREMITIES:  2+ Peripheral Pulses, No cyanosis, mild RLE edema no erythema proximal to dressing  LYMPH: No lymphadenopathy noted  SKIN: Warm, Intact. R foot wrapped in kerlix no saturation  PSYCH: Normal mood and affect  NERVOUS SYSTEM:  A/O x3, CN 2-12 intact, No focal deficits    LABS:                        11.1   11.26 )-----------( 329      ( 24 Nov 2021 08:27 )             34.6     11-24    136  |  99  |  15  ----------------------------<  145  4.6   |  27  |  0.63    Ca    9.8      24 Nov 2021 08:27  Phos  3.8     11-24  Mg     2.0     11-24    TPro  7.5  /  Alb  3.9  /  TBili  0.5  /  DBili  x   /  AST  33  /  ALT  99  /  AlkPhos  78  11-23    PT/INR - ( 23 Nov 2021 20:47 )   PT: 13.4 sec;   INR: 1.12          PTT - ( 23 Nov 2021 20:47 )  PTT:28.1 sec      Culture - Surgical Swab (collected 23 Nov 2021 17:46)  Source: .Surgical Swab right foot wound  Gram Stain (23 Nov 2021 18:22):    Few Gram positive cocci in pairs    Few Gram Positive Rods    Few WBC's  Preliminary Report (24 Nov 2021 10:07):    Culture in progress      COVID-19 PCR: Negative (11-23-21 @ 20:46)  COVID-19 PCR: Negative (10-27-21 @ 20:06)      RADIOLOGY & ADDITIONAL TESTS:  reviewed, none new
INFECTIOUS DISEASES INITIAL CONSULT NOTE    HPI: 46M h/o uncontrolled DM c/b 3rd toe gangrene s/p 3rd toe amputation on 9/29/21 c/b recurrent infection s/p 3rd MT head amputation on 10/28/21 with RLE angio w/ AT and PT stents on 10/29/21 p/w increased drainage from the surgical site and erythema involving R 4th toe.  Patient is a  and accidentally stepped onto a rusted nail at early September.  His 3rd R toe developed gangrene and was admitted from 9/28-10/2 for R toe gangrene/DFI.  Underwent R 3rd toe amputation on 9/29, treated with vanc/zosy.  Abscess culture grew Achromobacter and GBS.  Vascular discharged him with amox/bactrim x 2 weeks to treat residual SSTI.  He was readmitted from 10/27-11/1 for increased drainage and swelling of the amputation site and underwent MT head amputation.  OR culture from 3rd MT grew MSSA and prevotella.  he was discharged with amox/Bact/flagyl x 2 weeks course to treat residual infection.  He was getting wound vac and the toes were not  by dressing, resulting blisters between each toe.  When he saw Dr. Lindsay 2 weeks PTA, wound was healing well with minimal dressing. However when he was seen by Dr. Cary on 11/23, he was found to have increased drainage, foul smelling and now with 4th toe erythema. He was sent to ED for IV abx management.  Patient denied fever/chills, n/v/d, abdominal pain, SOB, CP.  Reports minimal pain at the foot.   He says erythema is already improving since vanc/zosyn started yesterday.       PAST MEDICAL & SURGICAL HISTORY:  Type II diabetes mellitus    Hyperlipidemia    Hypertension    Coronary artery disease    History of complete ray amputation of third toe of right foot          Review of Systems:   Constitutional, eyes, ENT, cardiovascular, respiratory, gastrointestinal, genitourinary, integumentary, neurological, psychiatric and heme/lymph are otherwise negative other than noted above       ANTIBIOTICS:  MEDICATIONS  (STANDING):  ascorbic acid 500 milliGRAM(s) Oral daily  aspirin enteric coated 81 milliGRAM(s) Oral daily  atorvastatin 40 milliGRAM(s) Oral at bedtime  clopidogrel Tablet 75 milliGRAM(s) Oral daily  dextrose 40% Gel 15 Gram(s) Oral once  dextrose 5%. 1000 milliLiter(s) (50 mL/Hr) IV Continuous <Continuous>  dextrose 5%. 1000 milliLiter(s) (100 mL/Hr) IV Continuous <Continuous>  dextrose 50% Injectable 25 Gram(s) IV Push once  dextrose 50% Injectable 12.5 Gram(s) IV Push once  dextrose 50% Injectable 25 Gram(s) IV Push once  glucagon  Injectable 1 milliGRAM(s) IntraMuscular once  heparin   Injectable 5000 Unit(s) SubCutaneous every 8 hours  insulin glargine Injectable (LANTUS) 44 Unit(s) SubCutaneous at bedtime  insulin lispro (ADMELOG) corrective regimen sliding scale   SubCutaneous Before meals and at bedtime  insulin lispro Injectable (ADMELOG) 8 Unit(s) SubCutaneous before breakfast  insulin lispro Injectable (ADMELOG) 8 Unit(s) SubCutaneous before lunch  insulin lispro Injectable (ADMELOG) 14 Unit(s) SubCutaneous before dinner  lisinopril 20 milliGRAM(s) Oral daily  melatonin 3 milliGRAM(s) Oral at bedtime  multivitamin 1 Tablet(s) Oral daily  piperacillin/tazobactam IVPB.. 3.375 Gram(s) IV Intermittent every 6 hours  vancomycin  IVPB 1500 milliGRAM(s) IV Intermittent every 12 hours    MEDICATIONS  (PRN):  acetaminophen     Tablet .. 650 milliGRAM(s) Oral every 6 hours PRN Moderate Pain (4 - 6)      Allergies    No Known Allergies    Intolerances        SOCIAL HISTORY:  Former smoker.  Denied EtOH/illicit.  Works as a .  Lives with his wife and 2 children (16 and 17) in [place]als in [place]       FAMILY HISTORY:  FH: myocardial infarction  Brother MI @ 41 (CABG), Father @ 55, Grandfather    FH: lung cancer  father     no FH leading to current infection    Vital Signs Last 24 Hrs  T(C): 37.1 (24 Nov 2021 10:04), Max: 37.5 (24 Nov 2021 00:18)  T(F): 98.8 (24 Nov 2021 10:04), Max: 99.5 (24 Nov 2021 00:18)  HR: 77 (24 Nov 2021 12:12) (77 - 88)  BP: 110/69 (24 Nov 2021 12:12) (110/69 - 139/75)  BP(mean): --  RR: 18 (24 Nov 2021 12:12) (18 - 18)  SpO2: 94% (24 Nov 2021 12:12) (94% - 98%)    11-23-21 @ 07:01  -  11-24-21 @ 07:00  --------------------------------------------------------  IN: 120 mL / OUT: 0 mL / NET: 120 mL    11-24-21 @ 07:01  -  11-24-21 @ 13:27  --------------------------------------------------------  IN: 0 mL / OUT: 0 mL / NET: 0 mL        PHYSICAL EXAM:  Constitutional: alert, NAD  Eyes: the sclera and conjunctiva were normal.   ENT: the ears and nose were normal in appearance.   Neck: the appearance of the neck was normal and the neck was supple.   Pulmonary: no respiratory distress and lungs were clear to auscultation bilaterally.   Heart: heart rate was normal and rhythm regular, normal S1 and S2  Abdomen: normal bowel sounds, soft, non-tender  Ext: s/p R 3rd toe MT amputation, site with serous drainage, mild erythema at surrounding tissue involving 4th toe   Neurological: no focal deficits.   Psychiatric: the affect was normal      LABS:                        11.1   11.26 )-----------( 329      ( 24 Nov 2021 08:27 )             34.6     11-24    136  |  99  |  15  ----------------------------<  145<H>  4.6   |  27  |  0.63    Ca    9.8      24 Nov 2021 08:27  Phos  3.8     11-24  Mg     2.0     11-24    TPro  7.5  /  Alb  3.9  /  TBili  0.5  /  DBili  x   /  AST  33  /  ALT  99<H>  /  AlkPhos  78  11-23    PT/INR - ( 23 Nov 2021 20:47 )   PT: 13.4 sec;   INR: 1.12          PTT - ( 23 Nov 2021 20:47 )  PTT:28.1 sec      MICROBIOLOGY:  11/24 BCx ngtd x 2  11/23 R foot WCx: p  11/29 R 3rd toe tissue Cx: strep mitis/oralis, MSSA, achromobacter xylosoxidans, staph caprae, helcococcus kunzii  11/29 R toe MT WCx: MSSA, prevotella bivia       RADIOLOGY & ADDITIONAL STUDIES:

## 2021-11-24 NOTE — DIETITIAN INITIAL EVALUATION ADULT. - OTHER INFO
45 y/o M PMHx DM, CAD, HTN, HLD, with most recently s/p amputation of R 3rd toe for gangrene after stepping on a nail 9/28/21 followed by 3rd toe ray amputation 10/28/21 and RLE angiogram with AT and PT stents 10/29/21, presents with new redness and swelling of right 4th toe+cellulitis. Referred to [facility] ED for admission and IV ABX. PICC line placed for 6 week course of abx.    Pt seen alert in room. Good PO intake at this time/PTA. Pt tries to follow diet-reports consuming WW bread, oatmeal. Does like fruit which he feels affects BG more then Other CHO, likely as he does not consume with protein. Pt reports starting insulin in last 2 months however still with overall High BG; 100-200 during day and high in AM, can range up to 200 with +Night time insulin use (just adjusted to 44units 1 month PTA). NKFA. No issues chewing/swallowing. Denies GI distress. BM+11/22. Wt stable per pt. No pain. Doroteo 21. No pressure ulcers (+R 3rd toe diabetic foot infection). No edema.   Please see below for nutritions recommendations.

## 2021-11-25 LAB
ANION GAP SERPL CALC-SCNC: 10 MMOL/L — SIGNIFICANT CHANGE UP (ref 5–17)
BUN SERPL-MCNC: 11 MG/DL — SIGNIFICANT CHANGE UP (ref 7–23)
CALCIUM SERPL-MCNC: 10.2 MG/DL — SIGNIFICANT CHANGE UP (ref 8.4–10.5)
CHLORIDE SERPL-SCNC: 100 MMOL/L — SIGNIFICANT CHANGE UP (ref 96–108)
CO2 SERPL-SCNC: 27 MMOL/L — SIGNIFICANT CHANGE UP (ref 22–31)
CREAT SERPL-MCNC: 0.63 MG/DL — SIGNIFICANT CHANGE UP (ref 0.5–1.3)
GLUCOSE BLDC GLUCOMTR-MCNC: 135 MG/DL — HIGH (ref 70–99)
GLUCOSE BLDC GLUCOMTR-MCNC: 159 MG/DL — HIGH (ref 70–99)
GLUCOSE BLDC GLUCOMTR-MCNC: 163 MG/DL — HIGH (ref 70–99)
GLUCOSE BLDC GLUCOMTR-MCNC: 319 MG/DL — HIGH (ref 70–99)
GLUCOSE SERPL-MCNC: 188 MG/DL — HIGH (ref 70–99)
HCT VFR BLD CALC: 35.5 % — LOW (ref 39–50)
HGB BLD-MCNC: 11.2 G/DL — LOW (ref 13–17)
MAGNESIUM SERPL-MCNC: 2.1 MG/DL — SIGNIFICANT CHANGE UP (ref 1.6–2.6)
MCHC RBC-ENTMCNC: 29.3 PG — SIGNIFICANT CHANGE UP (ref 27–34)
MCHC RBC-ENTMCNC: 31.5 GM/DL — LOW (ref 32–36)
MCV RBC AUTO: 92.9 FL — SIGNIFICANT CHANGE UP (ref 80–100)
NRBC # BLD: 0 /100 WBCS — SIGNIFICANT CHANGE UP (ref 0–0)
PHOSPHATE SERPL-MCNC: 3.4 MG/DL — SIGNIFICANT CHANGE UP (ref 2.5–4.5)
PLATELET # BLD AUTO: 338 K/UL — SIGNIFICANT CHANGE UP (ref 150–400)
POTASSIUM SERPL-MCNC: 4.5 MMOL/L — SIGNIFICANT CHANGE UP (ref 3.5–5.3)
POTASSIUM SERPL-SCNC: 4.5 MMOL/L — SIGNIFICANT CHANGE UP (ref 3.5–5.3)
RBC # BLD: 3.82 M/UL — LOW (ref 4.2–5.8)
RBC # FLD: 12.4 % — SIGNIFICANT CHANGE UP (ref 10.3–14.5)
SODIUM SERPL-SCNC: 137 MMOL/L — SIGNIFICANT CHANGE UP (ref 135–145)
VANCOMYCIN TROUGH SERPL-MCNC: 9.2 UG/ML — LOW (ref 10–20)
WBC # BLD: 11.15 K/UL — HIGH (ref 3.8–10.5)
WBC # FLD AUTO: 11.15 K/UL — HIGH (ref 3.8–10.5)

## 2021-11-25 PROCEDURE — 99232 SBSQ HOSP IP/OBS MODERATE 35: CPT

## 2021-11-25 RX ORDER — VANCOMYCIN HCL 1 G
1750 VIAL (EA) INTRAVENOUS EVERY 12 HOURS
Refills: 0 | Status: COMPLETED | OUTPATIENT
Start: 2021-11-25 | End: 2021-11-26

## 2021-11-25 RX ADMIN — CHLORHEXIDINE GLUCONATE 1 APPLICATION(S): 213 SOLUTION TOPICAL at 05:48

## 2021-11-25 RX ADMIN — HEPARIN SODIUM 5000 UNIT(S): 5000 INJECTION INTRAVENOUS; SUBCUTANEOUS at 05:49

## 2021-11-25 RX ADMIN — Medication 8 UNIT(S): at 12:41

## 2021-11-25 RX ADMIN — HEPARIN SODIUM 5000 UNIT(S): 5000 INJECTION INTRAVENOUS; SUBCUTANEOUS at 15:06

## 2021-11-25 RX ADMIN — ATORVASTATIN CALCIUM 40 MILLIGRAM(S): 80 TABLET, FILM COATED ORAL at 21:34

## 2021-11-25 RX ADMIN — CLOPIDOGREL BISULFATE 75 MILLIGRAM(S): 75 TABLET, FILM COATED ORAL at 12:40

## 2021-11-25 RX ADMIN — Medication 650 MILLIGRAM(S): at 21:43

## 2021-11-25 RX ADMIN — Medication 14 UNIT(S): at 17:23

## 2021-11-25 RX ADMIN — Medication 1 TABLET(S): at 12:40

## 2021-11-25 RX ADMIN — Medication 2: at 12:40

## 2021-11-25 RX ADMIN — PIPERACILLIN AND TAZOBACTAM 200 GRAM(S): 4; .5 INJECTION, POWDER, LYOPHILIZED, FOR SOLUTION INTRAVENOUS at 05:49

## 2021-11-25 RX ADMIN — PIPERACILLIN AND TAZOBACTAM 200 GRAM(S): 4; .5 INJECTION, POWDER, LYOPHILIZED, FOR SOLUTION INTRAVENOUS at 18:39

## 2021-11-25 RX ADMIN — PIPERACILLIN AND TAZOBACTAM 200 GRAM(S): 4; .5 INJECTION, POWDER, LYOPHILIZED, FOR SOLUTION INTRAVENOUS at 12:39

## 2021-11-25 RX ADMIN — Medication 250 MILLIGRAM(S): at 15:06

## 2021-11-25 RX ADMIN — LISINOPRIL 20 MILLIGRAM(S): 2.5 TABLET ORAL at 05:49

## 2021-11-25 RX ADMIN — Medication 81 MILLIGRAM(S): at 13:01

## 2021-11-25 RX ADMIN — Medication 500 MILLIGRAM(S): at 12:39

## 2021-11-25 RX ADMIN — HEPARIN SODIUM 5000 UNIT(S): 5000 INJECTION INTRAVENOUS; SUBCUTANEOUS at 21:34

## 2021-11-25 RX ADMIN — Medication 3 MILLIGRAM(S): at 21:34

## 2021-11-25 RX ADMIN — Medication 8: at 17:13

## 2021-11-25 RX ADMIN — Medication 2: at 07:17

## 2021-11-25 RX ADMIN — INSULIN GLARGINE 44 UNIT(S): 100 INJECTION, SOLUTION SUBCUTANEOUS at 21:34

## 2021-11-25 RX ADMIN — PIPERACILLIN AND TAZOBACTAM 200 GRAM(S): 4; .5 INJECTION, POWDER, LYOPHILIZED, FOR SOLUTION INTRAVENOUS at 00:19

## 2021-11-25 RX ADMIN — Medication 650 MILLIGRAM(S): at 20:43

## 2021-11-25 RX ADMIN — Medication 8 UNIT(S): at 07:17

## 2021-11-25 NOTE — PROGRESS NOTE ADULT - SUBJECTIVE AND OBJECTIVE BOX
INTERVAL HPI/OVERNIGHT EVENTS:  Afebrile.  R foot feels stiff, no pain.    CONSTITUTIONAL:  No fever, chills, night sweats  EYES:  No photophobia or visual changes  CARDIOVASCULAR:  No chest pain  RESPIRATORY:  No cough, wheezing, or SOB   GASTROINTESTINAL:  No nausea, vomiting, diarrhea, constipation, or abdominal pain  GENITOURINARY:  No frequency, urgency, dysuria or hematuria  NEUROLOGIC:  No headache, lightheadedness      ANTIBIOTICS/RELEVANT:    Vancomycin 1.75 g IV q12h      Vital Signs Last 24 Hrs  T(C): 36.7 (25 Nov 2021 19:45), Max: 37.1 (24 Nov 2021 22:07)  T(F): 98 (25 Nov 2021 19:45), Max: 98.8 (24 Nov 2021 22:07)  HR: 79 (25 Nov 2021 20:36) (78 - 85)  BP: 99/53 (25 Nov 2021 20:36) (99/53 - 128/73)  BP(mean): --  RR: 18 (25 Nov 2021 20:36) (16 - 18)  SpO2: 95% (25 Nov 2021 20:36) (93% - 98%)    PHYSICAL EXAM:  Constitutional:  Well-developed, well nourished  Eyes:  Sclerae anicteric, conjunctivae clear, PERRL  Ear/Nose/Throat:  No nasal exudate or sinus tenderness;  No buccal mucosal lesions, no pharyngeal erythema or exudate	  Neck:  Supple, no adenopathy  Respiratory:  Clear bilaterally  Cardiovascular:  RRR, S1S2, no murmur appreciated  Gastrointestinal:  Symmetric, normoactive BS, soft, NT, no masses, guarding or rebound.  No HSM  Extremities:  No edema.  RLE extremity wrapped by Vascular      LABS:                        11.2   11.15 )-----------( 338      ( 25 Nov 2021 10:43 )             35.5         11-25    137  |  100  |  11  ----------------------------<  188<H>  4.5   |  27  |  0.63    Ca    10.2      25 Nov 2021 10:43  Phos  3.4     11-25  Mg     2.1     11-25      Vancomycin trough 9.2 (10:43;  prior dose 11/24 23:13)      MICROBIOLOGY:    Blood cultures:  11/24 X 2- NGTD    Surgical swab 11/23 - Klebsiella pneumoniae, GBS, Corynebacterium      RADIOLOGY & ADDITIONAL STUDIES:

## 2021-11-25 NOTE — PROGRESS NOTE ADULT - SUBJECTIVE AND OBJECTIVE BOX
24 hr events: O/N: 3rd dose of vanco given, vtrough in Am    SUBJECTIVE: Patient seen at bedside with chief resident. No acute complaints, pain in foot improving. Denies CP, SOB, nausea, vomiting.     Vital Signs Last 24 Hrs  T(C): 37.1 (25 Nov 2021 06:01), Max: 37.1 (24 Nov 2021 10:04)  T(F): 98.8 (25 Nov 2021 06:01), Max: 98.8 (24 Nov 2021 10:04)  HR: 82 (25 Nov 2021 08:06) (77 - 88)  BP: 120/75 (25 Nov 2021 08:06) (108/66 - 120/75)  BP(mean): --  RR: 16 (25 Nov 2021 08:06) (16 - 18)  SpO2: 98% (25 Nov 2021 08:06) (93% - 98%)    Physical Exam:  General: NAD  Pulmonary: b/l breath sounds   Cardiovascular:s1 s2 Reg  Abdominal: soft  Extremities: rt 3rd toe amp site healing well  4th toe with erythema and swelling   no right midfoot swelling or tenderness   DP pulse palpable         I&O's Summary    24 Nov 2021 07:01  -  25 Nov 2021 07:00  --------------------------------------------------------  IN: 480 mL / OUT: 700 mL / NET: -220 mL        LABS:                        11.1   11.26 )-----------( 329      ( 24 Nov 2021 08:27 )             34.6     11-24    136  |  99  |  15  ----------------------------<  145<H>  4.6   |  27  |  0.63    Ca    9.8      24 Nov 2021 08:27  Phos  3.8     11-24  Mg     2.0     11-24    TPro  7.5  /  Alb  3.9  /  TBili  0.5  /  DBili  x   /  AST  33  /  ALT  99<H>  /  AlkPhos  78  11-23    PT/INR - ( 23 Nov 2021 20:47 )   PT: 13.4 sec;   INR: 1.12          PTT - ( 23 Nov 2021 20:47 )  PTT:28.1 sec    CAPILLARY BLOOD GLUCOSE      POCT Blood Glucose.: 159 mg/dL (25 Nov 2021 06:36)  POCT Blood Glucose.: 111 mg/dL (24 Nov 2021 21:40)  POCT Blood Glucose.: 110 mg/dL (24 Nov 2021 16:47)  POCT Blood Glucose.: 120 mg/dL (24 Nov 2021 11:59)    LIVER FUNCTIONS - ( 23 Nov 2021 20:47 )  Alb: 3.9 g/dL / Pro: 7.5 g/dL / ALK PHOS: 78 U/L / ALT: 99 U/L / AST: 33 U/L / GGT: x             RADIOLOGY & ADDITIONAL STUDIES:      ---------------------------------------------------------------------------  PLEASE CHECK WHEN PRESENT:  [  ] Heart Failure  [  ] Acute  [  ] Acute on Chronic  [  ] Chronic  -------------------------------------------------------------------  [  ]Diastolic [HFpEF]  [  ]Systolic [HFrEF]  [  ]Combined [HFpEF & HFrEF]  [  ] afib  [x] hypertensive heart disease  [  ]Other:  -------------------------------------------------------------------  [ ] Respiratory failure  [ ] Acute cor pulmonale  [ ] Asthma/COPD Exacerbation  [ ] Pleural effusion  [ ] Aspiration pneumonia  -------------------------------------------------------------------  [  ]ROSANNE  [  ]ATN  [  ]Reneal Medullary Necrosis  [  ]Renal Cortical Necrosis  [  ]Other Pathological Lesions:    [  ]CKD 1  [  ]CKD 2  [  ]CKD 3  [  ]CKD 4  [  ]CKD 5  [  ]Other  -------------------------------------------------------------------  [x]Diabetes  [x] Diabetic PVD Ulcer  [  ] Neuropathic ulcer to DM  [  ] Diabetes with Nephropathy  [  ] Osteomyelitis due to diabetes  --------------------------------------------------------------------  [  ]Malnutrition: See Nutrition Note  [  ]Cachexia  [  ]Other:   [  ]Supplement Ordered:  [  ]Morbid Obesity (BMI >=40]  ---------------------------------------------------------------------  [ ] Sepsis/severe sepsis/septic shock  [ ] UTI  [ ] Pneumonia  -----------------------------------------------------------------------  [ ] Acidosis/alkalosis  [ ] Fluid overload  [ ] Hypokalemia  [ ] Hyperkalemia  [ ] Hypomagnesemia  [ ] Hypophosphatemia  [ ] Hyperphosphatemia  ------------------------------------------------------------------------  [ ] Acute blood loss anemia  [ ] Post op blood loss anemia  [ ] Iron deficiency anemia  [ ] Anemia due to chronic disease  [ ] Hypercoagulable state  ----------------------------------------------------------------------  [ ] Cerebral infarction  [ ] Transient ischemia attack  [ ] Encephalopathy      45 y/o M pt with PMHx of DM, CAD, HTN, HLD, with most recently s/p amputation of R 3rd toe for gangrene after stepping on a nail 9/28/21 followed by 3rd toe ray amputation 10/28/21 and RLE angiogram with AT and PT stents on 10/29/21 he presents today with re-infection of the wound with right 4th toe cellulitis.     Vascular/PAD:  -Diabetic right foot infection  -WTD dressing  -c/w ASA and Plavix    HTN/HLD:  -c/w lisinopril  -c/w atorvastatin    DM:  -ISS  -Lispro 8u in Am, 8u lunch and 14u w/dinner, lantus 44u at bedtime    ID:  -Vanco and Zosyn  -    Diet: Diabetic diet    Activity: OOB as tolerated    DVTPPx: HSQ    Dispo: pending cultures, clinical improvement

## 2021-11-25 NOTE — PROGRESS NOTE ADULT - ASSESSMENT
46M h/o uncontrolled DM c/b 3rd toe gangrene s/p 3rd toe amputation on 9/29/21 c/b recurrent infection s/p 3rd MT head amputation on 10/28/21 with RLE angio w/ AT and PT stents on 10/29/21 p/w increased drainage from the surgical site and erythema involving R 4th toe.  Patient failed PO abx course twice already with worsening wound and new cellilitis (was not on optimal abx choice though) and MSSA and Prevotella grew from MT head margin, c/f residual OM. Would treat for OM with IV antibiotics.  Based upon culture from 10/28, major causative organism may be MSSA.   Culture from 11/23 is growing GBS, Klebsiella and Corynebacterium so far, but was superficial.  Vancomycin was very subtherapeutic but he now is on a very high dose - may do better on q8h dosing if still needed.  Will need careful monitoring of renal function.  Suggest:  - Continue to f/u blood cultures from 11/24 - if NG at 48 h, okay to place PICC (will need 6 week course)  - Continue to f/u surgical swab culture from 11/23 for additional organisms and susceptibility  - Continue vancomycin 1.75 g IV q12h for now.  Trough prior to 3rd administration at this dose.  - Continue pip-tazo 4.5 g IV q6h  Will continue to follow with you - team 1.

## 2021-11-25 NOTE — PROGRESS NOTE ADULT - ASSESSMENT
46YOM with history of obesity (BMI 31.4), insulin-dependent type 2 diabetes (a1c Sept 2021 11.5%, started on insulin at that time), PAD, essential HTN, CAD s/p PCI, HLD, recent R 3rd toe amputation complicated by surgical site infection with wound vac placed in October, now admitted to vascular surgery service for wound infection.    R 3rd toe diabetic foot infection  Peripheral arterial disease  Initial injury occurred after stepping on nail at work. s/p amputation of R 3rd toe 9/29, though has had issues with postoperative surgical site infection in October which was debrided. Now admitted with cellulitis in R foot around area of wound VAC site.  -on vanco/zosyn for now; ID consulted appreciate recs.   - PICC in place, pending long term ID plan.   -ASA/statin/plavix    Insulin dependent type 2 diabetes  Hgb a1c 11.5% Sept 2021 - started on insulin at that time. Follows with Dr. Linder. Home meds include metformin, Lantus 44U qhs, lispro 14U with breakfast, 8U with lunch and dinner  -home metformin on hold  -continue home insulin dose  -antibiotics in NS not D5  -FSBG qac/qhs with SSI    Essential hypertension  Takes lisinopril 20mg once daily at home. BP at goal  -continue home meds    CAD s/p PCI - continue home ASA/statin/plavix  Hyperlipidemia - continue home statin  Obesity - BMI is 31.4, affects all aspects of care     Dispo: pending clinical improvement and antibiotic plan. anticipated d/c 11/26

## 2021-11-25 NOTE — PROGRESS NOTE ADULT - SUBJECTIVE AND OBJECTIVE BOX
INTERVAL HPI/OVERNIGHT EVENTS: No acute events overnight. No new complaints. Pain is controlled. Tolerating PO. The right foot feels like normal. No fever or chills. ROS otherwise unchanged.     VITAL SIGNS:  T(F): 98.8 (11-25-21 @ 10:23)  HR: 78 (11-25-21 @ 12:38)  BP: 128/73 (11-25-21 @ 12:38)  RR: 16 (11-25-21 @ 12:38)  SpO2: 97% (11-25-21 @ 12:38)  Wt(kg): --    PHYSICAL EXAM:      Constitutional: NAD  HEENT: PERRLA, EOMI, Normal Hearing, MMM  Respiratory: CTAB  Cardiovascular: S1 and S2, RRR, no M/G/R  Gastrointestinal: BS+, soft, NT/ND  Extremities: No peripheral edema. RLE with dressing c/d/i.   Vascular: 2+ peripheral pulses  Musculoskeletal: 5/5 strength b/l upper and lower extremities  Skin: No rashes      MEDICATIONS  (STANDING):  ascorbic acid 500 milliGRAM(s) Oral daily  aspirin enteric coated 81 milliGRAM(s) Oral daily  atorvastatin 40 milliGRAM(s) Oral at bedtime  chlorhexidine 2% Cloths 1 Application(s) Topical <User Schedule>  clopidogrel Tablet 75 milliGRAM(s) Oral daily  dextrose 40% Gel 15 Gram(s) Oral once  dextrose 5%. 1000 milliLiter(s) (50 mL/Hr) IV Continuous <Continuous>  dextrose 5%. 1000 milliLiter(s) (100 mL/Hr) IV Continuous <Continuous>  dextrose 50% Injectable 25 Gram(s) IV Push once  dextrose 50% Injectable 12.5 Gram(s) IV Push once  dextrose 50% Injectable 25 Gram(s) IV Push once  glucagon  Injectable 1 milliGRAM(s) IntraMuscular once  heparin   Injectable 5000 Unit(s) SubCutaneous every 8 hours  insulin glargine Injectable (LANTUS) 44 Unit(s) SubCutaneous at bedtime  insulin lispro (ADMELOG) corrective regimen sliding scale   SubCutaneous Before meals and at bedtime  insulin lispro Injectable (ADMELOG) 8 Unit(s) SubCutaneous before breakfast  insulin lispro Injectable (ADMELOG) 8 Unit(s) SubCutaneous before lunch  insulin lispro Injectable (ADMELOG) 14 Unit(s) SubCutaneous before dinner  lisinopril 20 milliGRAM(s) Oral daily  melatonin 3 milliGRAM(s) Oral at bedtime  multivitamin 1 Tablet(s) Oral daily  piperacillin/tazobactam IVPB.. 4.5 Gram(s) IV Intermittent every 6 hours  vancomycin  IVPB 1750 milliGRAM(s) IV Intermittent every 12 hours    MEDICATIONS  (PRN):  acetaminophen     Tablet .. 650 milliGRAM(s) Oral every 6 hours PRN Moderate Pain (4 - 6)  sodium chloride 0.9% lock flush 10 milliLiter(s) IV Push every 1 hour PRN Pre/post blood products, medications, blood draw, and to maintain line patency      Allergies    No Known Allergies    Intolerances        LABS:                        11.2   11.15 )-----------( 338      ( 25 Nov 2021 10:43 )             35.5     11-25    137  |  100  |  11  ----------------------------<  188<H>  4.5   |  27  |  0.63    Ca    10.2      25 Nov 2021 10:43  Phos  3.4     11-25  Mg     2.1     11-25    TPro  7.5  /  Alb  3.9  /  TBili  0.5  /  DBili  x   /  AST  33  /  ALT  99<H>  /  AlkPhos  78  11-23    PT/INR - ( 23 Nov 2021 20:47 )   PT: 13.4 sec;   INR: 1.12          PTT - ( 23 Nov 2021 20:47 )  PTT:28.1 sec      RADIOLOGY & ADDITIONAL TESTS:

## 2021-11-26 LAB
-  CLINDAMYCIN: SIGNIFICANT CHANGE UP
-  ERYTHROMYCIN: SIGNIFICANT CHANGE UP
-  LEVOFLOXACIN: SIGNIFICANT CHANGE UP
-  PENICILLIN: SIGNIFICANT CHANGE UP
-  VANCOMYCIN: SIGNIFICANT CHANGE UP
ANION GAP SERPL CALC-SCNC: 10 MMOL/L — SIGNIFICANT CHANGE UP (ref 5–17)
BUN SERPL-MCNC: 16 MG/DL — SIGNIFICANT CHANGE UP (ref 7–23)
CALCIUM SERPL-MCNC: 9.4 MG/DL — SIGNIFICANT CHANGE UP (ref 8.4–10.5)
CHLORIDE SERPL-SCNC: 102 MMOL/L — SIGNIFICANT CHANGE UP (ref 96–108)
CO2 SERPL-SCNC: 25 MMOL/L — SIGNIFICANT CHANGE UP (ref 22–31)
CREAT SERPL-MCNC: 0.75 MG/DL — SIGNIFICANT CHANGE UP (ref 0.5–1.3)
GLUCOSE BLDC GLUCOMTR-MCNC: 159 MG/DL — HIGH (ref 70–99)
GLUCOSE BLDC GLUCOMTR-MCNC: 184 MG/DL — HIGH (ref 70–99)
GLUCOSE BLDC GLUCOMTR-MCNC: 210 MG/DL — HIGH (ref 70–99)
GLUCOSE BLDC GLUCOMTR-MCNC: 215 MG/DL — HIGH (ref 70–99)
GLUCOSE BLDC GLUCOMTR-MCNC: 245 MG/DL — HIGH (ref 70–99)
GLUCOSE SERPL-MCNC: 269 MG/DL — HIGH (ref 70–99)
HCT VFR BLD CALC: 32.8 % — LOW (ref 39–50)
HGB BLD-MCNC: 10.6 G/DL — LOW (ref 13–17)
MAGNESIUM SERPL-MCNC: 2.1 MG/DL — SIGNIFICANT CHANGE UP (ref 1.6–2.6)
MCHC RBC-ENTMCNC: 30.3 PG — SIGNIFICANT CHANGE UP (ref 27–34)
MCHC RBC-ENTMCNC: 32.3 GM/DL — SIGNIFICANT CHANGE UP (ref 32–36)
MCV RBC AUTO: 93.7 FL — SIGNIFICANT CHANGE UP (ref 80–100)
METHOD TYPE: SIGNIFICANT CHANGE UP
NRBC # BLD: 0 /100 WBCS — SIGNIFICANT CHANGE UP (ref 0–0)
PHOSPHATE SERPL-MCNC: 3.2 MG/DL — SIGNIFICANT CHANGE UP (ref 2.5–4.5)
PLATELET # BLD AUTO: 284 K/UL — SIGNIFICANT CHANGE UP (ref 150–400)
POTASSIUM SERPL-MCNC: 4.5 MMOL/L — SIGNIFICANT CHANGE UP (ref 3.5–5.3)
POTASSIUM SERPL-SCNC: 4.5 MMOL/L — SIGNIFICANT CHANGE UP (ref 3.5–5.3)
RBC # BLD: 3.5 M/UL — LOW (ref 4.2–5.8)
RBC # FLD: 12.4 % — SIGNIFICANT CHANGE UP (ref 10.3–14.5)
SODIUM SERPL-SCNC: 137 MMOL/L — SIGNIFICANT CHANGE UP (ref 135–145)
VANCOMYCIN TROUGH SERPL-MCNC: 9.5 UG/ML — LOW (ref 10–20)
WBC # BLD: 10.13 K/UL — SIGNIFICANT CHANGE UP (ref 3.8–10.5)
WBC # FLD AUTO: 10.13 K/UL — SIGNIFICANT CHANGE UP (ref 3.8–10.5)

## 2021-11-26 PROCEDURE — 99232 SBSQ HOSP IP/OBS MODERATE 35: CPT

## 2021-11-26 PROCEDURE — 99232 SBSQ HOSP IP/OBS MODERATE 35: CPT | Mod: GC

## 2021-11-26 RX ORDER — PIPERACILLIN AND TAZOBACTAM 4; .5 G/20ML; G/20ML
4.5 INJECTION, POWDER, LYOPHILIZED, FOR SOLUTION INTRAVENOUS EVERY 6 HOURS
Refills: 0 | Status: DISCONTINUED | OUTPATIENT
Start: 2021-11-26 | End: 2021-11-26

## 2021-11-26 RX ORDER — CEFAZOLIN SODIUM 1 G
2000 VIAL (EA) INJECTION ONCE
Refills: 0 | Status: COMPLETED | OUTPATIENT
Start: 2021-11-26 | End: 2021-11-26

## 2021-11-26 RX ORDER — CEFAZOLIN SODIUM 1 G
VIAL (EA) INJECTION
Refills: 0 | Status: DISCONTINUED | OUTPATIENT
Start: 2021-11-26 | End: 2021-11-29

## 2021-11-26 RX ORDER — MEROPENEM 1 G/30ML
2000 INJECTION INTRAVENOUS EVERY 8 HOURS
Refills: 0 | Status: DISCONTINUED | OUTPATIENT
Start: 2021-11-26 | End: 2021-11-26

## 2021-11-26 RX ORDER — MEROPENEM 1 G/30ML
2000 INJECTION INTRAVENOUS EVERY 8 HOURS
Refills: 0 | Status: DISCONTINUED | OUTPATIENT
Start: 2021-11-26 | End: 2021-11-27

## 2021-11-26 RX ORDER — CEFAZOLIN SODIUM 1 G
2000 VIAL (EA) INJECTION EVERY 8 HOURS
Refills: 0 | Status: DISCONTINUED | OUTPATIENT
Start: 2021-11-27 | End: 2021-11-29

## 2021-11-26 RX ORDER — INSULIN GLARGINE 100 [IU]/ML
46 INJECTION, SOLUTION SUBCUTANEOUS AT BEDTIME
Refills: 0 | Status: DISCONTINUED | OUTPATIENT
Start: 2021-11-26 | End: 2021-11-29

## 2021-11-26 RX ADMIN — HEPARIN SODIUM 5000 UNIT(S): 5000 INJECTION INTRAVENOUS; SUBCUTANEOUS at 05:48

## 2021-11-26 RX ADMIN — HEPARIN SODIUM 5000 UNIT(S): 5000 INJECTION INTRAVENOUS; SUBCUTANEOUS at 23:08

## 2021-11-26 RX ADMIN — HEPARIN SODIUM 5000 UNIT(S): 5000 INJECTION INTRAVENOUS; SUBCUTANEOUS at 13:13

## 2021-11-26 RX ADMIN — PIPERACILLIN AND TAZOBACTAM 200 GRAM(S): 4; .5 INJECTION, POWDER, LYOPHILIZED, FOR SOLUTION INTRAVENOUS at 00:13

## 2021-11-26 RX ADMIN — PIPERACILLIN AND TAZOBACTAM 200 GRAM(S): 4; .5 INJECTION, POWDER, LYOPHILIZED, FOR SOLUTION INTRAVENOUS at 05:48

## 2021-11-26 RX ADMIN — Medication 250 MILLIGRAM(S): at 02:07

## 2021-11-26 RX ADMIN — CHLORHEXIDINE GLUCONATE 1 APPLICATION(S): 213 SOLUTION TOPICAL at 05:49

## 2021-11-26 RX ADMIN — PIPERACILLIN AND TAZOBACTAM 200 GRAM(S): 4; .5 INJECTION, POWDER, LYOPHILIZED, FOR SOLUTION INTRAVENOUS at 13:13

## 2021-11-26 RX ADMIN — Medication 8 UNIT(S): at 12:19

## 2021-11-26 RX ADMIN — Medication 100 MILLIGRAM(S): at 19:58

## 2021-11-26 RX ADMIN — Medication 14 UNIT(S): at 17:41

## 2021-11-26 RX ADMIN — MEROPENEM 83.33 MILLIGRAM(S): 1 INJECTION INTRAVENOUS at 20:57

## 2021-11-26 RX ADMIN — LISINOPRIL 20 MILLIGRAM(S): 2.5 TABLET ORAL at 05:48

## 2021-11-26 RX ADMIN — ATORVASTATIN CALCIUM 40 MILLIGRAM(S): 80 TABLET, FILM COATED ORAL at 23:09

## 2021-11-26 RX ADMIN — Medication 250 MILLIGRAM(S): at 16:37

## 2021-11-26 RX ADMIN — INSULIN GLARGINE 46 UNIT(S): 100 INJECTION, SOLUTION SUBCUTANEOUS at 22:30

## 2021-11-26 RX ADMIN — Medication 81 MILLIGRAM(S): at 12:18

## 2021-11-26 RX ADMIN — Medication 4: at 12:19

## 2021-11-26 RX ADMIN — Medication 2: at 23:08

## 2021-11-26 RX ADMIN — CLOPIDOGREL BISULFATE 75 MILLIGRAM(S): 75 TABLET, FILM COATED ORAL at 12:18

## 2021-11-26 RX ADMIN — Medication 2: at 17:40

## 2021-11-26 RX ADMIN — Medication 8 UNIT(S): at 07:43

## 2021-11-26 RX ADMIN — Medication 3 MILLIGRAM(S): at 23:09

## 2021-11-26 RX ADMIN — Medication 4: at 07:42

## 2021-11-26 NOTE — PROGRESS NOTE ADULT - ASSESSMENT
46YOM with history of obesity (BMI 31.4), insulin-dependent type 2 diabetes (a1c Sept 2021 11.5%, started on insulin at that time), PAD, essential HTN, CAD s/p PCI, HLD, recent R 3rd toe amputation complicated by surgical site infection with wound vac placed in October, now admitted to vascular surgery service for wound infection.    # R 3rd toe diabetic foot infection  # Peripheral arterial disease  Initial injury occurred after stepping on nail at work. s/p amputation of R 3rd toe 9/29, though has had issues with postoperative surgical site infection in October which was debrided. Now admitted with cellulitis in R foot around area of wound VAC site.  -on vanco/zosyn for now; ID consulted appreciate recs.   -PICC in place, pending culture sensitivities   -ASA/statin/plavix    # Insulin dependent type 2 diabetes complicated by peripheral arterial disease and diabetic foot infection   Hgb a1c 11.5% Sept 2021 - started on insulin at that time. Follows with Dr. Linder. Home meds include metformin, Lantus 44U qhs, lispro 14U with breakfast, 8U with lunch and dinner  -home metformin on hold  -continue home insulin   -antibiotics in NS not D5  -FSBG qac/qhs with SSI    Essential hypertension  Takes lisinopril 20mg once daily at home. BP at goal  -continue home meds    CAD s/p PCI - continue home ASA/statin/plavix  Hyperlipidemia - continue home statin  Obesity - BMI is 31.4, affects all aspects of care     Dispo: pending clinical improvement and antibiotic plan. anticipated d/c 11/26   46YOM with history of obesity (BMI 31.4), insulin-dependent type 2 diabetes (a1c Sept 2021 11.5%, started on insulin at that time), PAD, essential HTN, CAD s/p PCI, HLD, recent R 3rd toe amputation complicated by surgical site infection with wound vac placed in October, now admitted to vascular surgery service for wound infection.    # R 3rd toe diabetic foot infection  # Peripheral arterial disease  Initial injury occurred after stepping on nail at work. s/p amputation of R 3rd toe 9/29, though has had issues with postoperative surgical site infection in October which was debrided. Now admitted with cellulitis in R foot around area of wound VAC site.  -on vanco/zosyn for now; ID consulted appreciate recs.   -PICC in place, pending culture sensitivities   -ASA/statin/plavix    # Insulin dependent type 2 diabetes complicated by peripheral arterial disease and diabetic foot infection   Hgb a1c 11.5% Sept 2021 - started on insulin at that time. Follows with Dr. Linder. Home meds include metformin, Lantus 44U qhs, lispro 14U with breakfast, 8U with lunch and dinner  -home metformin on hold  -continue home insulin   -antibiotics in NS not D5  -FSBG qac/qhs with SSI    # Essential hypertension  Takes lisinopril 20mg once daily at home. BP at goal  -continue home medications    # CAD s/p PCI - continue home ASA/statin/plavix  # Hyperlipidemia - continue home statin  # Obesity - BMI is 31.4, affects all aspects of care; dose medications by weight     Dispo: pending clinical improvement and antibiotic plan. anticipated d/c 11/26

## 2021-11-26 NOTE — PROGRESS NOTE ADULT - SUBJECTIVE AND OBJECTIVE BOX
ON: [facility]            ---------------------------------------------------------------------------  PLEASE CHECK WHEN PRESENT:  [  ] Heart Failure  [  ] Acute  [  ] Acute on Chronic  [  ] Chronic  -------------------------------------------------------------------  [  ]Diastolic [HFpEF]  [  ]Systolic [HFrEF]  [  ]Combined [HFpEF & HFrEF]  [  ] afib  [x] hypertensive heart disease  [  ]Other:  -------------------------------------------------------------------  [ ] Respiratory failure  [ ] Acute cor pulmonale  [ ] Asthma/COPD Exacerbation  [ ] Pleural effusion  [ ] Aspiration pneumonia  -------------------------------------------------------------------  [  ]ROSANNE  [  ]ATN  [  ]Reneal Medullary Necrosis  [  ]Renal Cortical Necrosis  [  ]Other Pathological Lesions:    [  ]CKD 1  [  ]CKD 2  [  ]CKD 3  [  ]CKD 4  [  ]CKD 5  [  ]Other  -------------------------------------------------------------------  [x]Diabetes  [x] Diabetic PVD Ulcer  [  ] Neuropathic ulcer to DM  [  ] Diabetes with Nephropathy  [  ] Osteomyelitis due to diabetes  --------------------------------------------------------------------  [  ]Malnutrition: See Nutrition Note  [  ]Cachexia  [  ]Other:   [  ]Supplement Ordered:  [  ]Morbid Obesity (BMI >=40]  ---------------------------------------------------------------------  [ ] Sepsis/severe sepsis/septic shock  [ ] UTI  [ ] Pneumonia  -----------------------------------------------------------------------  [ ] Acidosis/alkalosis  [ ] Fluid overload  [ ] Hypokalemia  [ ] Hyperkalemia  [ ] Hypomagnesemia  [ ] Hypophosphatemia  [ ] Hyperphosphatemia  ------------------------------------------------------------------------  [ ] Acute blood loss anemia  [ ] Post op blood loss anemia  [ ] Iron deficiency anemia  [ ] Anemia due to chronic disease  [ ] Hypercoagulable state  ----------------------------------------------------------------------  [ ] Cerebral infarction  [ ] Transient ischemia attack  [ ] Encephalopathy      45 y/o M pt with PMHx of DM, CAD, HTN, HLD, with most recently s/p amputation of R 3rd toe for gangrene after stepping on a nail 9/28/21 followed by 3rd toe ray amputation 10/28/21 and RLE angiogram with AT and PT stents on 10/29/21 he presents today with re-infection of the wound with right 4th toe cellulitis.     Vascular/PAD:  -Diabetic right foot infection  -WTD dressing  -c/w ASA and Plavix    HTN/HLD:  -c/w lisinopril  -c/w atorvastatin    DM:  -ISS  -Lispro 8u in Am, 8u lunch and 14u w/dinner, lantus 44u at bedtime    ID:  -Vanco and Zosyn  -Pending final surgical swab cultures sensitivities   - Will need 6 wks of abx    Diet: Diabetic diet    Activity: OOB as tolerated    DVTPPx: HSQ    Dispo: pending cultures, clinical improvement   ON: JIM VSS        SUBJECTIVE: No specific complaints this morning, pain well controlled      MEDICATIONS  (STANDING):  ascorbic acid 500 milliGRAM(s) Oral daily  aspirin enteric coated 81 milliGRAM(s) Oral daily  atorvastatin 40 milliGRAM(s) Oral at bedtime  chlorhexidine 2% Cloths 1 Application(s) Topical <User Schedule>  clopidogrel Tablet 75 milliGRAM(s) Oral daily  dextrose 40% Gel 15 Gram(s) Oral once  dextrose 5%. 1000 milliLiter(s) (50 mL/Hr) IV Continuous <Continuous>  dextrose 5%. 1000 milliLiter(s) (100 mL/Hr) IV Continuous <Continuous>  dextrose 50% Injectable 25 Gram(s) IV Push once  dextrose 50% Injectable 12.5 Gram(s) IV Push once  dextrose 50% Injectable 25 Gram(s) IV Push once  glucagon  Injectable 1 milliGRAM(s) IntraMuscular once  heparin   Injectable 5000 Unit(s) SubCutaneous every 8 hours  insulin glargine Injectable (LANTUS) 44 Unit(s) SubCutaneous at bedtime  insulin lispro (ADMELOG) corrective regimen sliding scale   SubCutaneous Before meals and at bedtime  insulin lispro Injectable (ADMELOG) 8 Unit(s) SubCutaneous before breakfast  insulin lispro Injectable (ADMELOG) 8 Unit(s) SubCutaneous before lunch  insulin lispro Injectable (ADMELOG) 14 Unit(s) SubCutaneous before dinner  lisinopril 20 milliGRAM(s) Oral daily  melatonin 3 milliGRAM(s) Oral at bedtime  multivitamin 1 Tablet(s) Oral daily  piperacillin/tazobactam IVPB.. 4.5 Gram(s) IV Intermittent every 6 hours  vancomycin  IVPB 1750 milliGRAM(s) IV Intermittent every 12 hours    MEDICATIONS  (PRN):  acetaminophen     Tablet .. 650 milliGRAM(s) Oral every 6 hours PRN Moderate Pain (4 - 6)  sodium chloride 0.9% lock flush 10 milliLiter(s) IV Push every 1 hour PRN Pre/post blood products, medications, blood draw, and to maintain line patency      Vital Signs Last 24 Hrs  T(C): 36.7 (26 Nov 2021 06:10), Max: 37.1 (25 Nov 2021 10:23)  T(F): 98.1 (26 Nov 2021 06:10), Max: 98.8 (25 Nov 2021 10:23)  HR: 74 (26 Nov 2021 05:47) (70 - 79)  BP: 112/69 (26 Nov 2021 05:47) (99/53 - 128/73)  BP(mean): --  RR: 18 (26 Nov 2021 05:47) (16 - 18)  SpO2: 95% (26 Nov 2021 05:47) (95% - 98%)    Physical Exam:  General: NAD, resting comfortably in bed  Pulmonary: Nonlabored breathing, no respiratory distress  Cardiovascular: NSR  Abdominal: soft, NT/ND  Extremities: WWP, normal strength, right 3rd digit amputation site, some fibrous exudate, minimal pain to palpation, dressing cdi      I&O's Summary    25 Nov 2021 07:01  -  26 Nov 2021 07:00  --------------------------------------------------------  IN: 480 mL / OUT: 0 mL / NET: 480 mL        LABS:                        10.6   10.13 )-----------( 284      ( 26 Nov 2021 06:34 )             32.8     11-26    137  |  102  |  16  ----------------------------<  269<H>  4.5   |  25  |  0.75    Ca    9.4      26 Nov 2021 06:34  Phos  3.2     11-26  Mg     2.1     11-26          CAPILLARY BLOOD GLUCOSE      POCT Blood Glucose.: 245 mg/dL (26 Nov 2021 06:47)  POCT Blood Glucose.: 135 mg/dL (25 Nov 2021 21:30)  POCT Blood Glucose.: 319 mg/dL (25 Nov 2021 16:48)  POCT Blood Glucose.: 163 mg/dL (25 Nov 2021 12:16)        RADIOLOGY & ADDITIONAL STUDIES:          ---------------------------------------------------------------------------  PLEASE CHECK WHEN PRESENT:  [  ] Heart Failure  [  ] Acute  [  ] Acute on Chronic  [  ] Chronic  -------------------------------------------------------------------  [  ]Diastolic [HFpEF]  [  ]Systolic [HFrEF]  [  ]Combined [HFpEF & HFrEF]  [  ] afib  [x] hypertensive heart disease  [  ]Other:  -------------------------------------------------------------------  [ ] Respiratory failure  [ ] Acute cor pulmonale  [ ] Asthma/COPD Exacerbation  [ ] Pleural effusion  [ ] Aspiration pneumonia  -------------------------------------------------------------------  [  ]ROSANNE  [  ]ATN  [  ]Reneal Medullary Necrosis  [  ]Renal Cortical Necrosis  [  ]Other Pathological Lesions:    [  ]CKD 1  [  ]CKD 2  [  ]CKD 3  [  ]CKD 4  [  ]CKD 5  [  ]Other  -------------------------------------------------------------------  [x]Diabetes  [x] Diabetic PVD Ulcer  [  ] Neuropathic ulcer to DM  [  ] Diabetes with Nephropathy  [  ] Osteomyelitis due to diabetes  --------------------------------------------------------------------  [  ]Malnutrition: See Nutrition Note  [  ]Cachexia  [  ]Other:   [  ]Supplement Ordered:  [  ]Morbid Obesity (BMI >=40]  ---------------------------------------------------------------------  [ ] Sepsis/severe sepsis/septic shock  [ ] UTI  [ ] Pneumonia  -----------------------------------------------------------------------  [ ] Acidosis/alkalosis  [ ] Fluid overload  [ ] Hypokalemia  [ ] Hyperkalemia  [ ] Hypomagnesemia  [ ] Hypophosphatemia  [ ] Hyperphosphatemia  ------------------------------------------------------------------------  [ ] Acute blood loss anemia  [ ] Post op blood loss anemia  [ ] Iron deficiency anemia  [ ] Anemia due to chronic disease  [ ] Hypercoagulable state  ----------------------------------------------------------------------  [ ] Cerebral infarction  [ ] Transient ischemia attack  [ ] Encephalopathy      47 y/o M pt with PMHx of DM, CAD, HTN, HLD, with most recently s/p amputation of R 3rd toe for gangrene after stepping on a nail 9/28/21 followed by 3rd toe ray amputation 10/28/21 and RLE angiogram with AT and PT stents on 10/29/21 he presents today with re-infection of the wound with right 4th toe cellulitis.     Vascular/PAD:  -Diabetic right foot infection  -WTD dressing  -c/w ASA and Plavix    HTN/HLD:  -c/w lisinopril  -c/w atorvastatin    DM:  -ISS  -Lispro 8u in Am, 8u lunch and 14u w/dinner, lantus 44u at bedtime    ID:  -Vanco and Zosyn  -Pending final surgical swab cultures sensitivities   - Will need 6 wks of abx    Diet: Diabetic diet    Activity: OOB as tolerated    DVTPPx: HSQ    Dispo: pending cultures, clinical improvement

## 2021-11-26 NOTE — PROGRESS NOTE ADULT - ASSESSMENT
46M h/o uncontrolled DM c/b 3rd toe gangrene s/p 3rd toe amputation on 9/29/21 c/b recurrent infection s/p 3rd MT head amputation on 10/28/21 with RLE angio w/ AT and PT stents on 10/29/21 p/w increased drainage from the surgical site and erythema involving R 4th toe.  Patient failed PO abx course twice already with worsening wound and new cellulitis (was not on optimal abx choice though) and MSSA and Prevotella grew from MT head margin, c/f residual OM. Would treat for OM with IV antibiotics.  Based upon culture from 10/28, major causative organism may be MSSA.   Culture from 11/23 is growing GBS, Klebsiella and Corynebacterium so far, but was superficial.  Vancomycin was very subtherapeutic but he now is on a very high dose - may do better on q8h dosing if still needed.  Will need careful monitoring of renal function.        Suggest:  - Continue to f/u blood cultures from 11/24 - if NG at 48 h, okay to place PICC (will need 6 week course)  - Continue to f/u surgical swab culture from 11/23 for additional organisms and susceptibility  - Continue vancomycin 1.75 g IV q12h for now.  Trough prior to 3rd administration at this dose.  - Continue pip-tazo 4.5 g IV q6h  Will continue to follow with you - team 1. 46M h/o uncontrolled DM c/b 3rd toe gangrene s/p 3rd toe amputation on 9/29/21 c/b recurrent infection s/p 3rd MT head amputation on 10/28/21 with RLE angio w/ AT and PT stents on 10/29/21 p/w increased drainage from the surgical site and erythema involving R 4th toe.  Patient failed PO abx course on nonoptimal Abx twice already with worsening wound and new cellulitis. MSSA and Prevotella grew from MT head margin, concern for residual OM. Would treat for OM with IV antibiotics.  Based upon culture from 10/28, major causative organism likely MSSA. Culture from 11/23 is growing GBS, Klebsiella and Corynebacterium so far, but was superficial.  Patient currently being treated with Vancomycin 1.75g BID and Zosyn 4.5g q6h, with plans to modify Antibiotics  pending sensitivities, will likely be able to discontinue Vancomycin.     Blood cx negative from 11/24. S/p PICC placement.     #Osteomyelitis     Recommend:   - Continue to f/u surgical swab culture from 11/23 for additional organisms and susceptibility, will likely have susceptibilities by this afternoon   - Continue vancomycin 1.75 g IV q12h for now.  Trough prior to 3rd administration at this dose. Pending Abx modification once susceptibilities back   - Continue pip-tazo 4.5 g IV q6h  - Abx duration will be 6 weeks of IV Antibiotics from negative blood cx on 11/24 (11/24 - 12/4)   - Will need follow up with ID 2 weeks after discharge, with Dr. Walter and weekly labs: CBC, CMP, ESR, CRP faxed to #[phone]       ID team 1 will continue to follow.     Plan discussed with ID attending, Dr. Walter.  46M h/o uncontrolled DM c/b 3rd toe gangrene s/p 3rd toe amputation on 9/29/21 c/b recurrent infection s/p 3rd MT head amputation on 10/28/21 with RLE angio w/ AT and PT stents on 10/29/21 p/w increased drainage from the surgical site and erythema involving R 4th toe.  Patient failed PO abx course on nonoptimal Abx twice already with worsening wound and new cellulitis. MSSA and Prevotella grew from MT head margin, concern for residual OM. Would treat for OM with IV antibiotics.  Based upon culture from 10/28, major causative organism likely MSSA. Culture from 11/23 is growing GBS, Klebsiella and Corynebacterium so far, but was superficial.  Patient currently being treated with Vancomycin 1.75g BID and Zosyn 4.5g q6h, with plans to modify Antibiotics  pending sensitivities, will likely be able to discontinue Vancomycin.     Blood cx negative from 11/24. S/p PICC placement.     #Osteomyelitis     Recommend:   - Continue to f/u surgical swab culture from 11/23 for additional organisms and susceptibility,   - Continue vancomycin 1.75 g IV q12h for now.  Trough prior to 3rd administration at this dose. Pending Abx modification once susceptibilities back   - Continue pip-tazo 4.5 g IV q6h  - Abx duration will be 6 weeks of IV Antibiotics from negative blood cx on 11/24 (11/24 - 12/4)   - Will need follow up with ID 2 weeks after discharge, with Dr. Walter and weekly labs: CBC, CMP, ESR, CRP faxed to #[phone]       ID team 1 will continue to follow.     Plan discussed with ID attending, Dr. Walter.

## 2021-11-26 NOTE — PROGRESS NOTE ADULT - ATTENDING COMMENTS
Erythema improving.  WBC improving.  Will continue with wet-to-dry and f/u cultures.  Will get PICC line as will be discharged on IV abx.
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  Will be resistant to pip-tazo.  His vancomycin trough remains subtherapeutic and OR cultures from 10/28 grew MSSA (and Prevotella).  He remains afebrile, WBC nl.  For now, would d/c vanc and pip-tazo.  Start cefazolin 2 g IV q8h to cover Staph and meropenem 2 g IV q8h to cover Klebsiella (and Prevotella) in case the carbapenem resistance is low-level.  Would give meropenem doses as extended infusion over 3 h.  Additional susceptibilities should be available tomorrow.  Will continue to follow with you - team 1.

## 2021-11-26 NOTE — PROGRESS NOTE ADULT - SUBJECTIVE AND OBJECTIVE BOX
Patient is a 46y old  Male who presents with a chief complaint of Diabetic right foot infection (26 Nov 2021 05:10)    INTERVAL EVENTS:  - Tolerating PO without nausea   - no new complaints today. no pt complaining of any problems with urination, or with bowel movements   - culture sensitivities pending     SUBJECTIVE:  Patient was seen and examined at bedside.  Review of systems: Patient denies: fever, chills, dizziness, HA, Changes in vision, CP, dyspnea, nausea or vomiting, dysuria, changes in bowel movements, LE edema. Rest of 12 point Review of systems negative unless otherwise documented elsewhere in note.     MEDICATIONS:  MEDICATIONS  (STANDING):  ascorbic acid 500 milliGRAM(s) Oral daily  aspirin enteric coated 81 milliGRAM(s) Oral daily  atorvastatin 40 milliGRAM(s) Oral at bedtime  chlorhexidine 2% Cloths 1 Application(s) Topical <User Schedule>  clopidogrel Tablet 75 milliGRAM(s) Oral daily  dextrose 40% Gel 15 Gram(s) Oral once  dextrose 5%. 1000 milliLiter(s) (50 mL/Hr) IV Continuous <Continuous>  dextrose 5%. 1000 milliLiter(s) (100 mL/Hr) IV Continuous <Continuous>  dextrose 50% Injectable 25 Gram(s) IV Push once  dextrose 50% Injectable 12.5 Gram(s) IV Push once  dextrose 50% Injectable 25 Gram(s) IV Push once  glucagon  Injectable 1 milliGRAM(s) IntraMuscular once  heparin   Injectable 5000 Unit(s) SubCutaneous every 8 hours  insulin glargine Injectable (LANTUS) 44 Unit(s) SubCutaneous at bedtime  insulin lispro (ADMELOG) corrective regimen sliding scale   SubCutaneous Before meals and at bedtime  insulin lispro Injectable (ADMELOG) 8 Unit(s) SubCutaneous before breakfast  insulin lispro Injectable (ADMELOG) 8 Unit(s) SubCutaneous before lunch  insulin lispro Injectable (ADMELOG) 14 Unit(s) SubCutaneous before dinner  lisinopril 20 milliGRAM(s) Oral daily  melatonin 3 milliGRAM(s) Oral at bedtime  multivitamin 1 Tablet(s) Oral daily  piperacillin/tazobactam IVPB.. 4.5 Gram(s) IV Intermittent every 6 hours  vancomycin  IVPB 1750 milliGRAM(s) IV Intermittent every 12 hours    MEDICATIONS  (PRN):  acetaminophen     Tablet .. 650 milliGRAM(s) Oral every 6 hours PRN Moderate Pain (4 - 6)  sodium chloride 0.9% lock flush 10 milliLiter(s) IV Push every 1 hour PRN Pre/post blood products, medications, blood draw, and to maintain line patency      Allergies    No Known Allergies    Intolerances      OBJECTIVE:  Vital Signs Last 24 Hrs  T(C): 36.8 (26 Nov 2021 09:02), Max: 37.1 (25 Nov 2021 22:08)  T(F): 98.2 (26 Nov 2021 09:02), Max: 98.7 (25 Nov 2021 22:08)  HR: 73 (26 Nov 2021 08:18) (70 - 79)  BP: 116/73 (26 Nov 2021 08:18) (99/53 - 128/73)  BP(mean): --  RR: 16 (26 Nov 2021 08:18) (16 - 18)  SpO2: 97% (26 Nov 2021 08:18) (95% - 98%)  I&O's Summary    25 Nov 2021 07:01  -  26 Nov 2021 07:00  --------------------------------------------------------  IN: 480 mL / OUT: 0 mL / NET: 480 mL        PHYSICAL EXAM:  Gen: Reclining in bed at time of exam, appears stated age  HEENT: NCAT, MMM, clear OP  Neck: supple, trachea at midline  CV: RRR, +S1/S2  Pulm: adequate respiratory effort, no increase in work of breathing  Abd: soft, NTND  Skin: warm and dry, no new rashes vs prior report  Ext: WWP, no LE edema; right foot dressed in clean, dry gauze   Neuro: AOx3, no gross focal neurological deficits  Psych: affect and behavior appropriate, pleasant at time of interview    LABS:                        10.6   10.13 )-----------( 284      ( 26 Nov 2021 06:34 )             32.8     11-26    137  |  102  |  16  ----------------------------<  269<H>  4.5   |  25  |  0.75    Ca    9.4      26 Nov 2021 06:34  Phos  3.2     11-26  Mg     2.1     11-26          CAPILLARY BLOOD GLUCOSE      POCT Blood Glucose.: 215 mg/dL (26 Nov 2021 11:25)  POCT Blood Glucose.: 245 mg/dL (26 Nov 2021 06:47)  POCT Blood Glucose.: 135 mg/dL (25 Nov 2021 21:30)  POCT Blood Glucose.: 319 mg/dL (25 Nov 2021 16:48)  POCT Blood Glucose.: 163 mg/dL (25 Nov 2021 12:16)        MICRODATA:    Culture - Blood (collected 24 Nov 2021 00:19)  Source: .Blood Blood  Preliminary Report (26 Nov 2021 01:00):    No growth at 2 days.    Culture - Blood (collected 24 Nov 2021 00:19)  Source: .Blood Blood  Preliminary Report (26 Nov 2021 01:00):    No growth at 2 days.    Culture - Surgical Swab (collected 23 Nov 2021 17:46)  Source: .Surgical Swab right foot wound  Gram Stain (23 Nov 2021 18:22):    Few Gram positive cocci in pairs    Few Gram Positive Rods    Few WBC's  Preliminary Report (25 Nov 2021 15:23):    Rare Klebsiella pneumoniae    Few Streptococcus agalactiae (Group B)    Rare Corynebacterium striatum group    Culture being held to rule out anaerobes.        RADIOLOGY/OTHER STUDIES:

## 2021-11-26 NOTE — PROVIDER CONTACT NOTE (OTHER) - ASSESSMENT
Pt's VSS. Red port of pt's PICC line unable to flush. Purple port of pt's PICC line able to flush with + blood return.

## 2021-11-26 NOTE — PROGRESS NOTE ADULT - SUBJECTIVE AND OBJECTIVE BOX
INFECTIOUS DISEASE PROGRESS NOTE:     INTERVAL HPI/OVERNIGHT EVENTS:    OVERNIGHT: No overnight events.    SUBJECTIVE: Patient seen and examined at bedside. He denies right foot pain this morning. Denies fever, chills, chest pain, SOB, abd pain, N/V/D.     REVIEW OF SYSTEMS:    All other review of systems is negative unless indicated above.      Allergies    No Known Allergies    Intolerances        ANTIBIOTICS/RELEVANT:  antimicrobials  piperacillin/tazobactam IVPB.. 4.5 Gram(s) IV Intermittent every 6 hours  vancomycin  IVPB 1750 milliGRAM(s) IV Intermittent every 12 hours    immunologic:    OTHER:  acetaminophen     Tablet .. 650 milliGRAM(s) Oral every 6 hours PRN  ascorbic acid 500 milliGRAM(s) Oral daily  aspirin enteric coated 81 milliGRAM(s) Oral daily  atorvastatin 40 milliGRAM(s) Oral at bedtime  chlorhexidine 2% Cloths 1 Application(s) Topical <User Schedule>  clopidogrel Tablet 75 milliGRAM(s) Oral daily  dextrose 40% Gel 15 Gram(s) Oral once  dextrose 5%. 1000 milliLiter(s) IV Continuous <Continuous>  dextrose 5%. 1000 milliLiter(s) IV Continuous <Continuous>  dextrose 50% Injectable 25 Gram(s) IV Push once  dextrose 50% Injectable 12.5 Gram(s) IV Push once  dextrose 50% Injectable 25 Gram(s) IV Push once  glucagon  Injectable 1 milliGRAM(s) IntraMuscular once  heparin   Injectable 5000 Unit(s) SubCutaneous every 8 hours  insulin glargine Injectable (LANTUS) 44 Unit(s) SubCutaneous at bedtime  insulin lispro (ADMELOG) corrective regimen sliding scale   SubCutaneous Before meals and at bedtime  insulin lispro Injectable (ADMELOG) 8 Unit(s) SubCutaneous before breakfast  insulin lispro Injectable (ADMELOG) 8 Unit(s) SubCutaneous before lunch  insulin lispro Injectable (ADMELOG) 14 Unit(s) SubCutaneous before dinner  lisinopril 20 milliGRAM(s) Oral daily  melatonin 3 milliGRAM(s) Oral at bedtime  multivitamin 1 Tablet(s) Oral daily  sodium chloride 0.9% lock flush 10 milliLiter(s) IV Push every 1 hour PRN      Objective:  Vital Signs Last 24 Hrs  T(C): 36.8 (26 Nov 2021 09:02), Max: 37.1 (25 Nov 2021 22:08)  T(F): 98.2 (26 Nov 2021 09:02), Max: 98.7 (25 Nov 2021 22:08)  HR: 73 (26 Nov 2021 08:18) (70 - 79)  BP: 116/73 (26 Nov 2021 08:18) (99/53 - 128/73)  BP(mean): --  RR: 16 (26 Nov 2021 08:18) (16 - 18)  SpO2: 97% (26 Nov 2021 08:18) (95% - 98%)      PHYSICAL EXAM:    Constitutional:  NAD  Head: NC/AT  Eyes: PERRLA, EOMI, clear conjunctiva  ENT: no nasal discharge; no oropharyngeal erythema or exudates; dry oral mucosa  Neck: supple; no JVD   Respiratory: CTA B/L; no W/R/R, no retractions  Cardiac: +S1/S2; RRR; no M/R/G; PMI non-displaced  Gastrointestinal: soft, NT/ND; no rebound or guarding; +BS, no hepatosplenomegaly  Extremities: Right foot wrapped with kerlex and gauze   Vascular: 2+ radial, DP/PT pulses B/L  Lymphatic: no submandibular or cervical LAD  Skin: no rash, no ulcers  Neurologic: AAOx3;         LABS:                        10.6   10.13 )-----------( 284      ( 26 Nov 2021 06:34 )             32.8     11-26    137  |  102  |  16  ----------------------------<  269<H>  4.5   |  25  |  0.75    Ca    9.4      26 Nov 2021 06:34  Phos  3.2     11-26  Mg     2.1     11-26            MICROBIOLOGY:            RECENT CULTURES:  11-24 @ 00:19  .Blood Blood  --  --  --    No growth at 2 days.  --  11-23 @ 17:46  .Surgical Swab right foot wound  --  --  --    Rare Klebsiella pneumoniae  Few Streptococcus agalactiae (Group B)  Rare Corynebacterium striatum group  Culture being held to rule out anaerobes.  --      RADIOLOGY & ADDITIONAL STUDIES:

## 2021-11-27 LAB
-  AMPICILLIN/SULBACTAM: SIGNIFICANT CHANGE UP
-  AMPICILLIN: SIGNIFICANT CHANGE UP
-  CEFAZOLIN: SIGNIFICANT CHANGE UP
-  CEFTRIAXONE: SIGNIFICANT CHANGE UP
-  CIPROFLOXACIN: SIGNIFICANT CHANGE UP
-  ERTAPENEM: SIGNIFICANT CHANGE UP
-  GENTAMICIN: SIGNIFICANT CHANGE UP
-  MEROPENEM: SIGNIFICANT CHANGE UP
-  MEROPENEM: SIGNIFICANT CHANGE UP
-  PIPERACILLIN/TAZOBACTAM: SIGNIFICANT CHANGE UP
-  POLYMYXIN B: SIGNIFICANT CHANGE UP
-  TOBRAMYCIN: SIGNIFICANT CHANGE UP
-  TRIMETHOPRIM/SULFAMETHOXAZOLE: SIGNIFICANT CHANGE UP
CULTURE RESULTS: SIGNIFICANT CHANGE UP
CULTURE RESULTS: SIGNIFICANT CHANGE UP
GLUCOSE BLDC GLUCOMTR-MCNC: 157 MG/DL — HIGH (ref 70–99)
GLUCOSE BLDC GLUCOMTR-MCNC: 186 MG/DL — HIGH (ref 70–99)
GLUCOSE BLDC GLUCOMTR-MCNC: 190 MG/DL — HIGH (ref 70–99)
GLUCOSE BLDC GLUCOMTR-MCNC: 195 MG/DL — HIGH (ref 70–99)
METHOD TYPE: SIGNIFICANT CHANGE UP
METHOD TYPE: SIGNIFICANT CHANGE UP
SPECIMEN SOURCE: SIGNIFICANT CHANGE UP
SPECIMEN SOURCE: SIGNIFICANT CHANGE UP
VANCOMYCIN TROUGH SERPL-MCNC: 8.3 UG/ML — LOW (ref 10–20)

## 2021-11-27 PROCEDURE — 99232 SBSQ HOSP IP/OBS MODERATE 35: CPT

## 2021-11-27 PROCEDURE — 99233 SBSQ HOSP IP/OBS HIGH 50: CPT

## 2021-11-27 RX ORDER — ERTAPENEM SODIUM 1 G/1
1000 INJECTION, POWDER, LYOPHILIZED, FOR SOLUTION INTRAMUSCULAR; INTRAVENOUS EVERY 24 HOURS
Refills: 0 | Status: DISCONTINUED | OUTPATIENT
Start: 2021-11-27 | End: 2021-11-29

## 2021-11-27 RX ORDER — SODIUM HYPOCHLORITE 0.125 %
1 SOLUTION, NON-ORAL MISCELLANEOUS ONCE
Refills: 0 | Status: COMPLETED | OUTPATIENT
Start: 2021-11-27 | End: 2021-11-27

## 2021-11-27 RX ADMIN — Medication 8 UNIT(S): at 13:33

## 2021-11-27 RX ADMIN — Medication 2: at 17:48

## 2021-11-27 RX ADMIN — Medication 1 TABLET(S): at 11:49

## 2021-11-27 RX ADMIN — CLOPIDOGREL BISULFATE 75 MILLIGRAM(S): 75 TABLET, FILM COATED ORAL at 11:49

## 2021-11-27 RX ADMIN — MEROPENEM 83.33 MILLIGRAM(S): 1 INJECTION INTRAVENOUS at 15:02

## 2021-11-27 RX ADMIN — Medication 100 MILLIGRAM(S): at 14:15

## 2021-11-27 RX ADMIN — Medication 3 MILLIGRAM(S): at 22:15

## 2021-11-27 RX ADMIN — Medication 14 UNIT(S): at 18:02

## 2021-11-27 RX ADMIN — ERTAPENEM SODIUM 120 MILLIGRAM(S): 1 INJECTION, POWDER, LYOPHILIZED, FOR SOLUTION INTRAMUSCULAR; INTRAVENOUS at 22:10

## 2021-11-27 RX ADMIN — Medication 2: at 07:29

## 2021-11-27 RX ADMIN — HEPARIN SODIUM 5000 UNIT(S): 5000 INJECTION INTRAVENOUS; SUBCUTANEOUS at 05:54

## 2021-11-27 RX ADMIN — LISINOPRIL 20 MILLIGRAM(S): 2.5 TABLET ORAL at 05:54

## 2021-11-27 RX ADMIN — Medication 100 MILLIGRAM(S): at 23:15

## 2021-11-27 RX ADMIN — Medication 1 APPLICATION(S): at 09:15

## 2021-11-27 RX ADMIN — Medication 8 UNIT(S): at 09:00

## 2021-11-27 RX ADMIN — ATORVASTATIN CALCIUM 40 MILLIGRAM(S): 80 TABLET, FILM COATED ORAL at 22:15

## 2021-11-27 RX ADMIN — HEPARIN SODIUM 5000 UNIT(S): 5000 INJECTION INTRAVENOUS; SUBCUTANEOUS at 22:15

## 2021-11-27 RX ADMIN — Medication 81 MILLIGRAM(S): at 11:49

## 2021-11-27 RX ADMIN — CHLORHEXIDINE GLUCONATE 1 APPLICATION(S): 213 SOLUTION TOPICAL at 05:55

## 2021-11-27 RX ADMIN — MEROPENEM 83.33 MILLIGRAM(S): 1 INJECTION INTRAVENOUS at 06:45

## 2021-11-27 RX ADMIN — Medication 500 MILLIGRAM(S): at 11:49

## 2021-11-27 RX ADMIN — HEPARIN SODIUM 5000 UNIT(S): 5000 INJECTION INTRAVENOUS; SUBCUTANEOUS at 14:10

## 2021-11-27 RX ADMIN — Medication 2: at 22:15

## 2021-11-27 RX ADMIN — Medication 100 MILLIGRAM(S): at 05:53

## 2021-11-27 RX ADMIN — INSULIN GLARGINE 46 UNIT(S): 100 INJECTION, SOLUTION SUBCUTANEOUS at 22:16

## 2021-11-27 RX ADMIN — Medication 2: at 12:25

## 2021-11-27 NOTE — PROGRESS NOTE ADULT - ASSESSMENT
46YOM with history of obesity (BMI 31.4), insulin-dependent type 2 diabetes (a1c Sept 2021 11.5%, started on insulin at that time), PAD, essential HTN, CAD s/p PCI, HLD, recent R 3rd toe amputation complicated by surgical site infection with wound vac placed in October, now admitted to vascular surgery service for wound infection.    # R 3rd toe diabetic foot infection  # Peripheral arterial disease  Initial injury occurred after stepping on nail at work. s/p amputation of R 3rd toe 9/29, though has had issues with postoperative surgical site infection in October which was debrided. Now admitted with cellulitis in R foot around area of wound VAC site.  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  Will be resistant to pip-tazo.  His vancomycin trough remains subtherapeutic and OR cultures from 10/28 grew MSSA (and Prevotella).  He remains afebrile, WBC nl.  For now, would d/c vanc and pip-tazo.  Start cefazolin 2 g IV q8h to cover Staph and meropenem 2 g IV q8h to cover Klebsiella (and Prevotella) in case the carbapenem resistance is low-level.  Would give meropenem doses as extended infusion over 3 h.  Additional susceptibilities should be available soon, monitor clinical course  -PICC in place, pending culture sensitivities   -ASA/statin/plavix    # Insulin dependent type 2 diabetes complicated by peripheral arterial disease and diabetic foot infection   Hgb a1c 11.5% Sept 2021 - started on insulin at that time. Follows with Dr. Linder. Home meds include metformin, Lantus 44U qhs, lispro 14U with breakfast, 8U with lunch and dinner  -home metformin on hold  -continue home insulin   -antibiotics in NS not D5  -FSBG qac/qhs with SSI    # Essential hypertension  Takes lisinopril 20mg once daily at home. BP at goal  -continue home medications    # CAD s/p PCI - continue home ASA/statin/plavix  # Hyperlipidemia - continue home statin  # Obesity - BMI is 31.4, affects all aspects of care; dose medications by weight     Dispo: pending clinical improvement and antibiotic plan as per ID

## 2021-11-27 NOTE — PROGRESS NOTE ADULT - SUBJECTIVE AND OBJECTIVE BOX
Patient is a 46y old  Male who presents with a chief complaint of Diabetic right foot infection (27 Nov 2021 06:28)      INTERVAL HPI: Pt seen and examined. States he feels ok, has some pain but manages with pain med, denies any other acute complaints at this time. States he tries to maintan compliance with his diabetic regimen.     OVERNIGHT EVENTS: none noted  T(F): 98.5 (11-27-21 @ 13:54), Max: 98.6 (11-26-21 @ 18:40)  HR: 73 (11-27-21 @ 08:44) (70 - 77)  BP: 125/78 (11-27-21 @ 08:44) (112/66 - 125/78)  RR: 18 (11-27-21 @ 08:44) (16 - 18)  SpO2: 94% (11-27-21 @ 08:44) (94% - 98%)  I&O's Summary    26 Nov 2021 07:01  -  27 Nov 2021 07:00  --------------------------------------------------------  IN: 600 mL / OUT: 0 mL / NET: 600 mL    27 Nov 2021 07:01  -  27 Nov 2021 14:00  --------------------------------------------------------  IN: 180 mL / OUT: 0 mL / NET: 180 mL        REVIEW OF SYSTEMS: comprehensive ros negative except that stated in hpi    PHYSICAL EXAM:  Gen:  NAD, pleasant and cooperative on history and exam  HEENT: NCAT, MMM, clear OP  Neck: supple, trachea at midline  CV: RRR, +S1/S2  Pulm: adequate respiratory effort, no increase in work of breathing  Abd: soft, NTND  Skin: warm and dry, no new rashes vs prior report  Ext: WWP, no LE edema; right foot dressed in clean, dry gauze   Neuro: AOx3, no gross focal neurological deficits  Psych: affect and behavior appropriate, pleasant at time of interview    LABS:                        10.6   10.13 )-----------( 284      ( 26 Nov 2021 06:34 )             32.8     11-26    137  |  102  |  16  ----------------------------<  269<H>  4.5   |  25  |  0.75    Ca    9.4      26 Nov 2021 06:34  Phos  3.2     11-26  Mg     2.1     11-26          CAPILLARY BLOOD GLUCOSE      POCT Blood Glucose.: 157 mg/dL (27 Nov 2021 12:19)  POCT Blood Glucose.: 186 mg/dL (27 Nov 2021 06:36)  POCT Blood Glucose.: 184 mg/dL (26 Nov 2021 23:01)  POCT Blood Glucose.: 210 mg/dL (26 Nov 2021 21:31)  POCT Blood Glucose.: 159 mg/dL (26 Nov 2021 17:32)      11-24 @ 00:19   No growth at 3 days.  --  --  11-23 @ 17:46   Rare Klebsiella pneumoniae ESBL ... Result called to and read back by_  Dr. GRUPO Hay  11/26/2021 15:37:34  Few Streptococcus agalactiae (Group B)  Rare Corynebacterium striatum group  Rare to few Staphylococcus aureus Presumptive Methicillin susceptible  Confirmation to follow within 24 hrs. Susceptibility to follow.  Few Trueperella bernardiae  Few Actinotignum schaali group  Culture grew 3 or more types of organisms which indicate collection  contamination; consider recollection only if clinically indicated.  --  Streptococcus agalactiae (Group B)          MEDICATIONS  (STANDING):  ascorbic acid 500 milliGRAM(s) Oral daily  aspirin enteric coated 81 milliGRAM(s) Oral daily  atorvastatin 40 milliGRAM(s) Oral at bedtime  ceFAZolin   IVPB 2000 milliGRAM(s) IV Intermittent every 8 hours  ceFAZolin   IVPB      chlorhexidine 2% Cloths 1 Application(s) Topical <User Schedule>  clopidogrel Tablet 75 milliGRAM(s) Oral daily  dextrose 40% Gel 15 Gram(s) Oral once  dextrose 5%. 1000 milliLiter(s) (50 mL/Hr) IV Continuous <Continuous>  dextrose 5%. 1000 milliLiter(s) (100 mL/Hr) IV Continuous <Continuous>  dextrose 50% Injectable 25 Gram(s) IV Push once  dextrose 50% Injectable 12.5 Gram(s) IV Push once  dextrose 50% Injectable 25 Gram(s) IV Push once  glucagon  Injectable 1 milliGRAM(s) IntraMuscular once  heparin   Injectable 5000 Unit(s) SubCutaneous every 8 hours  insulin glargine Injectable (LANTUS) 46 Unit(s) SubCutaneous at bedtime  insulin lispro (ADMELOG) corrective regimen sliding scale   SubCutaneous Before meals and at bedtime  insulin lispro Injectable (ADMELOG) 8 Unit(s) SubCutaneous before breakfast  insulin lispro Injectable (ADMELOG) 8 Unit(s) SubCutaneous before lunch  insulin lispro Injectable (ADMELOG) 14 Unit(s) SubCutaneous before dinner  lisinopril 20 milliGRAM(s) Oral daily  melatonin 3 milliGRAM(s) Oral at bedtime  meropenem  IVPB 2000 milliGRAM(s) IV Intermittent every 8 hours  multivitamin 1 Tablet(s) Oral daily    MEDICATIONS  (PRN):  acetaminophen     Tablet .. 650 milliGRAM(s) Oral every 6 hours PRN Moderate Pain (4 - 6)  sodium chloride 0.9% lock flush 10 milliLiter(s) IV Push every 1 hour PRN Pre/post blood products, medications, blood draw, and to maintain line patency

## 2021-11-27 NOTE — PROGRESS NOTE ADULT - SUBJECTIVE AND OBJECTIVE BOX
O/N: JACINTO FERNANDEZ                            RADIOLOGY & ADDITIONAL STUDIES:          ---------------------------------------------------------------------------  PLEASE CHECK WHEN PRESENT:  [  ] Heart Failure  [  ] Acute  [  ] Acute on Chronic  [  ] Chronic  -------------------------------------------------------------------  [  ]Diastolic [HFpEF]  [  ]Systolic [HFrEF]  [  ]Combined [HFpEF & HFrEF]  [  ] afib  [x] hypertensive heart disease  [  ]Other:  -------------------------------------------------------------------  [ ] Respiratory failure  [ ] Acute cor pulmonale  [ ] Asthma/COPD Exacerbation  [ ] Pleural effusion  [ ] Aspiration pneumonia  -------------------------------------------------------------------  [  ]ROSANNE  [  ]ATN  [  ]Reneal Medullary Necrosis  [  ]Renal Cortical Necrosis  [  ]Other Pathological Lesions:    [  ]CKD 1  [  ]CKD 2  [  ]CKD 3  [  ]CKD 4  [  ]CKD 5  [  ]Other  -------------------------------------------------------------------  [x]Diabetes  [x] Diabetic PVD Ulcer  [  ] Neuropathic ulcer to DM  [  ] Diabetes with Nephropathy  [  ] Osteomyelitis due to diabetes  --------------------------------------------------------------------  [  ]Malnutrition: See Nutrition Note  [  ]Cachexia  [  ]Other:   [  ]Supplement Ordered:  [  ]Morbid Obesity (BMI >=40]  ---------------------------------------------------------------------  [ ] Sepsis/severe sepsis/septic shock  [ ] UTI  [ ] Pneumonia  -----------------------------------------------------------------------  [ ] Acidosis/alkalosis  [ ] Fluid overload  [ ] Hypokalemia  [ ] Hyperkalemia  [ ] Hypomagnesemia  [ ] Hypophosphatemia  [ ] Hyperphosphatemia  ------------------------------------------------------------------------  [ ] Acute blood loss anemia  [ ] Post op blood loss anemia  [ ] Iron deficiency anemia  [ ] Anemia due to chronic disease  [ ] Hypercoagulable state  ----------------------------------------------------------------------  [ ] Cerebral infarction  [ ] Transient ischemia attack  [ ] Encephalopathy      47 y/o M pt with PMHx of DM, CAD, HTN, HLD, with most recently s/p amputation of R 3rd toe for gangrene after stepping on a nail 9/28/21 followed by 3rd toe ray amputation 10/28/21 and RLE angiogram with AT and PT stents on 10/29/21 he presents today with re-infection of the wound with right 4th toe cellulitis.     Vascular/PAD:  -Diabetic right foot infection  -WTD dressing  -c/w ASA and Plavix    HTN/HLD:  -c/w lisinopril  -c/w atorvastatin    DM:  -ISS  -Lispro 8u in Am, 8u lunch and 14u w/dinner, lantus 44u at bedtime    ID:  -Vanco and Zosyn  -Pending final surgical swab cultures sensitivities   - Will need 6 wks of abx    Diet: Diabetic diet    Activity: OOB as tolerated    DVTPPx: HSQ    Dispo: pending cultures, clinical improvement   O/N: JIM, VSS        SUBJECTIVE: Patient seen and examined bedside by vascular team. No complaints at this time. Has not had any fevers/chills. Denies any worsening pain of his foot wound.    aspirin enteric coated 81 milliGRAM(s) Oral daily  ceFAZolin   IVPB 2000 milliGRAM(s) IV Intermittent every 8 hours  ceFAZolin   IVPB      clopidogrel Tablet 75 milliGRAM(s) Oral daily  heparin   Injectable 5000 Unit(s) SubCutaneous every 8 hours  lisinopril 20 milliGRAM(s) Oral daily  meropenem  IVPB 2000 milliGRAM(s) IV Intermittent every 8 hours      Vital Signs Last 24 Hrs  T(C): 36.7 (27 Nov 2021 09:43), Max: 37 (26 Nov 2021 18:40)  T(F): 98 (27 Nov 2021 09:43), Max: 98.6 (26 Nov 2021 18:40)  HR: 73 (27 Nov 2021 08:44) (70 - 80)  BP: 125/78 (27 Nov 2021 08:44) (112/66 - 125/78)  BP(mean): --  RR: 18 (27 Nov 2021 08:44) (16 - 18)  SpO2: 94% (27 Nov 2021 08:44) (94% - 98%)  I&O's Detail    26 Nov 2021 07:01  -  27 Nov 2021 07:00  --------------------------------------------------------  IN:    Oral Fluid: 600 mL  Total IN: 600 mL    OUT:  Total OUT: 0 mL    Total NET: 600 mL      27 Nov 2021 07:01  -  27 Nov 2021 11:12  --------------------------------------------------------  IN:    Oral Fluid: 180 mL  Total IN: 180 mL    OUT:  Total OUT: 0 mL    Total NET: 180 mL          Physical Exam:  General: No acute distress, resting comfortably in bed  C/V: normal sinus rhythm  Pulm: Nonlabored breathing, no respiratory distress  Abd: soft, non-tender, non-distended.  Extrem: warm and well perfused, no edema. R 3rd digit amputation site with some fibrinous exudate present. No signs of erythema or pus.    LABS:                        10.6   10.13 )-----------( 284      ( 26 Nov 2021 06:34 )             32.8     11-26    137  |  102  |  16  ----------------------------<  269<H>  4.5   |  25  |  0.75    Ca    9.4      26 Nov 2021 06:34  Phos  3.2     11-26  Mg     2.1     11-26            RADIOLOGY & ADDITIONAL STUDIES:                      RADIOLOGY & ADDITIONAL STUDIES:          ---------------------------------------------------------------------------  PLEASE CHECK WHEN PRESENT:  [  ] Heart Failure  [  ] Acute  [  ] Acute on Chronic  [  ] Chronic  -------------------------------------------------------------------  [  ]Diastolic [HFpEF]  [  ]Systolic [HFrEF]  [  ]Combined [HFpEF & HFrEF]  [  ] afib  [x] hypertensive heart disease  [  ]Other:  -------------------------------------------------------------------  [ ] Respiratory failure  [ ] Acute cor pulmonale  [ ] Asthma/COPD Exacerbation  [ ] Pleural effusion  [ ] Aspiration pneumonia  -------------------------------------------------------------------  [  ]ROSANNE  [  ]ATN  [  ]Reneal Medullary Necrosis  [  ]Renal Cortical Necrosis  [  ]Other Pathological Lesions:    [  ]CKD 1  [  ]CKD 2  [  ]CKD 3  [  ]CKD 4  [  ]CKD 5  [  ]Other  -------------------------------------------------------------------  [x]Diabetes  [x] Diabetic PVD Ulcer  [  ] Neuropathic ulcer to DM  [  ] Diabetes with Nephropathy  [  ] Osteomyelitis due to diabetes  --------------------------------------------------------------------  [  ]Malnutrition: See Nutrition Note  [  ]Cachexia  [  ]Other:   [  ]Supplement Ordered:  [  ]Morbid Obesity (BMI >=40]  ---------------------------------------------------------------------  [ ] Sepsis/severe sepsis/septic shock  [ ] UTI  [ ] Pneumonia  -----------------------------------------------------------------------  [ ] Acidosis/alkalosis  [ ] Fluid overload  [ ] Hypokalemia  [ ] Hyperkalemia  [ ] Hypomagnesemia  [ ] Hypophosphatemia  [ ] Hyperphosphatemia  ------------------------------------------------------------------------  [ ] Acute blood loss anemia  [ ] Post op blood loss anemia  [ ] Iron deficiency anemia  [ ] Anemia due to chronic disease  [ ] Hypercoagulable state  ----------------------------------------------------------------------  [ ] Cerebral infarction  [ ] Transient ischemia attack  [ ] Encephalopathy      Assessment  47 y/o M pt with PMHx of DM, CAD, HTN, HLD, with most recently s/p amputation of R 3rd toe for gangrene after stepping on a nail 9/28/21 followed by 3rd toe ray amputation 10/28/21 and RLE angiogram with AT and PT stents on 10/29/21 he presents today with re-infection of the wound with right 4th toe cellulitis.     Vascular/PAD:  -Diabetic right foot infection  -WTD dressing  -c/w ASA and Plavix    HTN/HLD:  -c/w lisinopril  -c/w atorvastatin    DM:  -ISS  -Lispro 8u in Am, 8u lunch and 14u w/dinner, lantus 44u at bedtime    ID:  -Cal and Zosyn dc'd 11/26  -Cefazolin and meropenem started 11/27  -Pending final surgical swab cultures sensitivities   - Will need 6 wks of abx    Diet: Diabetic diet    Activity: OOB as tolerated    DVTPPx: HSQ    Dispo: pending cultures and final sensitivities, clinical improvement

## 2021-11-27 NOTE — PROGRESS NOTE ADULT - ASSESSMENT
46M h/o uncontrolled DM c/b 3rd toe gangrene s/p 3rd toe amputation on 9/29/21 c/b recurrent infection s/p 3rd MT head amputation on 10/28/21 with RLE angio w/ AT and PT stents on 10/29/21 p/w increased drainage from the surgical site and erythema involving R 4th toe.  Patient failed PO abx course twice already with worsening wound and new cellulitis (was not on optimal abx choice though) and MSSA and Prevotella grew from MT head margin, c/f residual OM.  Unclear that each of the organisms from the surgical swab needs to be treated (was superficial) but would include coverage for Klebsiella pneumoniae, and GBS, as well as for the MSSA seen both here and from the OR culture from 10/28.  Would also cover Prevotella (10/28).  Additional susceptibilities done - Klebsiella is carbapenem susceptible by E test.    Suggest:  - Start ertapenem 1 g IV q24h  - Continue cefazolin 2 g IV q8h  - D/C meropenem  - Would time antibiotics from start of effective therapy with plan for 6 week course (11/26-1/6)  - Weekly labs while on IV antibiotics:  CBC, CMP, ESR and CRP - fax to me at [phone]  Recommendations discussed with primary team.  Will follow with you - team 1.

## 2021-11-27 NOTE — PROGRESS NOTE ADULT - SUBJECTIVE AND OBJECTIVE BOX
INTERVAL HPI/OVERNIGHT EVENTS:  Afebrile.  Foot feels better with no further pressure today    CONSTITUTIONAL:  No fever, chills, night sweats  EYES:  No photophobia or visual changes  CARDIOVASCULAR:  No chest pain  RESPIRATORY:  No cough, wheezing, or SOB   GASTROINTESTINAL:  No nausea, vomiting, diarrhea, constipation, or abdominal pain  GENITOURINARY:  No frequency, urgency, dysuria or hematuria  NEUROLOGIC:  No headache, lightheadedness      ANTIBIOTICS/RELEVANT:    Cefazolin 2 g IV q8h (11/26-present)  Meropenem 2 g IV q8h (11/26-present)    Vancomycin 11/23-11/26  Pip-tazo 11/23-11/26      Vital Signs Last 24 Hrs  T(C): 36.9 (27 Nov 2021 13:54), Max: 37 (26 Nov 2021 18:40)  T(F): 98.5 (27 Nov 2021 13:54), Max: 98.6 (26 Nov 2021 18:40)  HR: 73 (27 Nov 2021 14:15) (70 - 76)  BP: 129/78 (27 Nov 2021 14:15) (112/66 - 129/78)  BP(mean): --  RR: 18 (27 Nov 2021 14:15) (16 - 18)  SpO2: 97% (27 Nov 2021 14:15) (94% - 97%)    PHYSICAL EXAM:  Constitutional:  Well-developed, well nourished  Eyes:  Sclerae anicteric, conjunctivae clear, PERRL  Ear/Nose/Throat:  No nasal exudate or sinus tenderness;  No buccal mucosal lesions, no pharyngeal erythema or exudate	  Neck:  Supple, no adenopathy  Respiratory:  Clear bilaterally  Cardiovascular:  RRR, S1S2, no murmur appreciated  Gastrointestinal:  Symmetric, normoactive BS, soft, NT, no masses, guarding or rebound.  No HSM  Extremities:  No edema.  R foot wrapped.  RUE PICC.      LABS:                        10.6   10.13 )-----------( 284      ( 26 Nov 2021 06:34 )             32.8         11-26    137  |  102  |  16  ----------------------------<  269<H>  4.5   |  25  |  0.75    Ca    9.4      26 Nov 2021 06:34  Phos  3.2     11-26  Mg     2.1     11-26            MICROBIOLOGY:  Blood cultures:  11/24 X 2- NGTD    Surgical swab 11/23 - Klebsiella pneumoniae, GBS, Corynebacterium, MSSA, Trueperella, Actinotignum        RADIOLOGY & ADDITIONAL STUDIES:

## 2021-11-28 ENCOUNTER — TRANSCRIPTION ENCOUNTER (OUTPATIENT)
Age: 46
End: 2021-11-28

## 2021-11-28 LAB
-  CEFAZOLIN: SIGNIFICANT CHANGE UP
-  CLINDAMYCIN: SIGNIFICANT CHANGE UP
-  ERYTHROMYCIN: SIGNIFICANT CHANGE UP
-  LINEZOLID: SIGNIFICANT CHANGE UP
-  OXACILLIN: SIGNIFICANT CHANGE UP
-  RIFAMPIN: SIGNIFICANT CHANGE UP
-  TRIMETHOPRIM/SULFAMETHOXAZOLE: SIGNIFICANT CHANGE UP
-  VANCOMYCIN: SIGNIFICANT CHANGE UP
GLUCOSE BLDC GLUCOMTR-MCNC: 128 MG/DL — HIGH (ref 70–99)
GLUCOSE BLDC GLUCOMTR-MCNC: 138 MG/DL — HIGH (ref 70–99)
GLUCOSE BLDC GLUCOMTR-MCNC: 160 MG/DL — HIGH (ref 70–99)
GLUCOSE BLDC GLUCOMTR-MCNC: 191 MG/DL — HIGH (ref 70–99)
METHOD TYPE: SIGNIFICANT CHANGE UP

## 2021-11-28 PROCEDURE — 99232 SBSQ HOSP IP/OBS MODERATE 35: CPT

## 2021-11-28 RX ORDER — ERTAPENEM SODIUM 1 G/1
1 INJECTION, POWDER, LYOPHILIZED, FOR SOLUTION INTRAMUSCULAR; INTRAVENOUS
Qty: 43 | Refills: 0
Start: 2021-11-28 | End: 2022-01-09

## 2021-11-28 RX ORDER — POLYETHYLENE GLYCOL 3350 17 G/17G
17 POWDER, FOR SOLUTION ORAL ONCE
Refills: 0 | Status: COMPLETED | OUTPATIENT
Start: 2021-11-28 | End: 2021-11-28

## 2021-11-28 RX ORDER — CEFAZOLIN SODIUM 1 G
50 VIAL (EA) INJECTION
Qty: 6450 | Refills: 0
Start: 2021-11-28 | End: 2022-01-09

## 2021-11-28 RX ORDER — HEPARIN SODIUM 5000 [USP'U]/ML
1 INJECTION INTRAVENOUS; SUBCUTANEOUS
Qty: 9 | Refills: 0
Start: 2021-11-28 | End: 2021-11-30

## 2021-11-28 RX ADMIN — Medication 8 UNIT(S): at 08:53

## 2021-11-28 RX ADMIN — HEPARIN SODIUM 5000 UNIT(S): 5000 INJECTION INTRAVENOUS; SUBCUTANEOUS at 06:47

## 2021-11-28 RX ADMIN — HEPARIN SODIUM 5000 UNIT(S): 5000 INJECTION INTRAVENOUS; SUBCUTANEOUS at 22:16

## 2021-11-28 RX ADMIN — Medication 14 UNIT(S): at 18:09

## 2021-11-28 RX ADMIN — Medication 500 MILLIGRAM(S): at 12:29

## 2021-11-28 RX ADMIN — ATORVASTATIN CALCIUM 40 MILLIGRAM(S): 80 TABLET, FILM COATED ORAL at 22:16

## 2021-11-28 RX ADMIN — CLOPIDOGREL BISULFATE 75 MILLIGRAM(S): 75 TABLET, FILM COATED ORAL at 12:29

## 2021-11-28 RX ADMIN — POLYETHYLENE GLYCOL 3350 17 GRAM(S): 17 POWDER, FOR SOLUTION ORAL at 23:22

## 2021-11-28 RX ADMIN — LISINOPRIL 20 MILLIGRAM(S): 2.5 TABLET ORAL at 09:57

## 2021-11-28 RX ADMIN — Medication 100 MILLIGRAM(S): at 22:18

## 2021-11-28 RX ADMIN — Medication 8 UNIT(S): at 11:39

## 2021-11-28 RX ADMIN — Medication 100 MILLIGRAM(S): at 06:47

## 2021-11-28 RX ADMIN — Medication 2: at 16:40

## 2021-11-28 RX ADMIN — ERTAPENEM SODIUM 120 MILLIGRAM(S): 1 INJECTION, POWDER, LYOPHILIZED, FOR SOLUTION INTRAMUSCULAR; INTRAVENOUS at 22:17

## 2021-11-28 RX ADMIN — Medication 1 TABLET(S): at 12:29

## 2021-11-28 RX ADMIN — Medication 2: at 07:36

## 2021-11-28 RX ADMIN — Medication 3 MILLIGRAM(S): at 22:16

## 2021-11-28 RX ADMIN — INSULIN GLARGINE 46 UNIT(S): 100 INJECTION, SOLUTION SUBCUTANEOUS at 22:16

## 2021-11-28 RX ADMIN — HEPARIN SODIUM 5000 UNIT(S): 5000 INJECTION INTRAVENOUS; SUBCUTANEOUS at 14:56

## 2021-11-28 RX ADMIN — CHLORHEXIDINE GLUCONATE 1 APPLICATION(S): 213 SOLUTION TOPICAL at 06:48

## 2021-11-28 RX ADMIN — Medication 81 MILLIGRAM(S): at 12:29

## 2021-11-28 RX ADMIN — Medication 100 MILLIGRAM(S): at 14:56

## 2021-11-28 NOTE — PROGRESS NOTE ADULT - ASSESSMENT
46M h/o uncontrolled DM c/b 3rd toe gangrene s/p 3rd toe amputation on 9/29/21 c/b recurrent infection s/p 3rd MT head amputation on 10/28/21 with RLE angio w/ AT and PT stents on 10/29/21 p/w increased drainage from the surgical site and erythema involving R 4th toe.  Patient failed PO abx course twice already with worsening wound and new cellulitis (was not on optimal abx choice though) and MSSA and Prevotella grew from MT head margin, c/f residual OM.  Unclear that each of the organisms from the surgical swab needs to be treated (was superficial) but would include coverage for Klebsiella pneumoniae, and GBS, as well as for the MSSA seen both here and from the OR culture from 10/28.  Would also cover Prevotella (10/28).    Suggest:  - Continue cefazolin 2 g IV q8h  - Continue ertapenem 1 g IV q24h  - Would time antibiotics from start of effective therapy with plan for 6 week course (11/26-1/6)  - Weekly labs while on IV antibiotics:  CBC, CMP, ESR and CRP - fax to me at [phone]  - I will arrange for f/u with me in ~2 weeks  Recommendations discussed with primary team.  Please recall if further ID input is desired - team 1.

## 2021-11-28 NOTE — DISCHARGE NOTE PROVIDER - NSDCFUADDINST_GEN_ALL_CORE_FT
FOLLOW UP:Dr. Cary in 1 week. Call his office to schedule an appt: [phone].      CALL OFFICE FOR CONCERNS: [phone] with any questions.  Call the office if you experience increasing pain, redness, swelling or drainage from incision sites/wounds, or temperature >101.4F.    WOUND CARE: Wound should be managed with twice daily dressing changes. Remove old bandage. Place gauze moistened with dakins solution into the wound, wrap foot to mid calf with kerlix. You may shower, remove dressing first. Allow soap and water to run over incision sites. Do not scrub. Pat dry. Redress when done.     ACTIVITY: Ambulate as tolerated, but no heavy lifting (>10lbs) or strenuous exercise.    DIET:  You may resume regular diabetic diet.      NEW MEDICATIONS:    DISCHARGE DESTINATION:   FOLLOW UP: Dr. Cary in 1 week.   CALL OFFICE FOR CONCERNS: [phone] with any questions.  Call the office if you experience increasing pain, redness, swelling or drainage from incision sites/wounds, or temperature >101.4F.    WOUND CARE: Wound should be managed with twice daily dressing changes. Remove old bandage. Place gauze moistened with dakins solution into the wound, wrap foot to mid calf with kerlix. You may shower, remove dressing first. Allow soap and water to run over incision sites. Do not scrub. Pat dry. Redress when done.     ACTIVITY: Ambulate as tolerated, but no heavy lifting (>10lbs) or strenuous exercise.    DIET:  You may resume regular diabetic diet.      NEW MEDICATIONS: Antibiotics- Ertapenem and Cefazolin.  PICC line placed.    DISCHARGE DESTINATION: home with home infusion   FOLLOW UP: Dr. Cary in 1 week. Your appointment is scheduled for 12/7 at 1:30pm.  CALL OFFICE FOR CONCERNS: [phone] with any questions.  Call the office if you experience increasing pain, redness, swelling or drainage from incision sites/wounds, or temperature >101.4F.    WOUND CARE: Wound should be managed with twice daily dressing changes. Remove old bandage. Place gauze moistened with dakins solution into the wound, wrap foot to mid calf with kerlix. You may shower, remove dressing first. Allow soap and water to run over incision sites. Do not scrub. Pat dry. Redress when done.     ACTIVITY: Ambulate as tolerated, but no heavy lifting (>10lbs) or strenuous exercise.    DIET:  You may resume regular diabetic diet.      NEW MEDICATIONS: Antibiotics- Ertapenem and Cefazolin.  PICC line placed.    ADDITIONAL FOLLOW UP: Dr. Walter infectious disease.  Follow up in 2 weeks.    DISCHARGE DESTINATION: home with home infusion

## 2021-11-28 NOTE — DISCHARGE NOTE PROVIDER - NSDCCPCAREPLAN_GEN_ALL_CORE_FT
PRINCIPAL DISCHARGE DIAGNOSIS  Diagnosis: Diabetic foot ulcer with osteomyelitis  Assessment and Plan of Treatment:       SECONDARY DISCHARGE DIAGNOSES  Diagnosis: Hypertension  Assessment and Plan of Treatment:     Diagnosis: Hyperlipidemia  Assessment and Plan of Treatment:     Diagnosis: Diabetes  Assessment and Plan of Treatment:      PRINCIPAL DISCHARGE DIAGNOSIS  Diagnosis: Diabetic foot ulcer with osteomyelitis  Assessment and Plan of Treatment:       SECONDARY DISCHARGE DIAGNOSES  Diagnosis: Diabetes  Assessment and Plan of Treatment:     Diagnosis: Hypertension  Assessment and Plan of Treatment:     Diagnosis: Hyperlipidemia  Assessment and Plan of Treatment:     Diagnosis: Hyperglycemia  Assessment and Plan of Treatment:     Diagnosis: Diabetic foot ulcer  Assessment and Plan of Treatment:     Diagnosis: Leukocytosis  Assessment and Plan of Treatment:     Diagnosis: CAD (coronary artery disease)  Assessment and Plan of Treatment:

## 2021-11-28 NOTE — DISCHARGE NOTE PROVIDER - NSDCFUSCHEDAPPT_GEN_ALL_CORE_FT
DAKOTA HARGROVE ; 12/14/2021 ; [facility] [address] ; 02/25/2022 ; [facility] [address]

## 2021-11-28 NOTE — PROGRESS NOTE ADULT - SUBJECTIVE AND OBJECTIVE BOX
Patient is a 46y old  Male who presents with a chief complaint of Diabetic right foot infection (28 Nov 2021 08:58)      INTERVAL HPI: Pt seen and examined. States he is going home today on new iv antibx regimen. Denies any acute complaints at this time.     OVERNIGHT EVENTS: none noted  T(F): 97.8 (11-28-21 @ 09:06), Max: 98.5 (11-27-21 @ 13:54)  HR: 81 (11-28-21 @ 09:00) (67 - 81)  BP: 121/80 (11-28-21 @ 09:00) (97/62 - 129/78)  RR: 16 (11-28-21 @ 09:00) (16 - 20)  SpO2: 96% (11-28-21 @ 09:00) (96% - 98%)  I&O's Summary    27 Nov 2021 07:01  -  28 Nov 2021 07:00  --------------------------------------------------------  IN: 636.6 mL / OUT: 0 mL / NET: 636.6 mL    28 Nov 2021 07:01  -  28 Nov 2021 12:36  --------------------------------------------------------  IN: 180 mL / OUT: 0 mL / NET: 180 mL        REVIEW OF SYSTEMS: comprehensive ros negative except that stated in hpi    PHYSICAL EXAM:  Gen:  NAD, pleasant and cooperative on history and exam  HEENT: NCAT, MMM, clear OP  Neck: supple, trachea at midline  CV: RRR, +S1/S2  Pulm: adequate respiratory effort, no increase in work of breathing  Abd: soft, NTND  Skin: warm and dry, no new rashes vs prior report  Ext: WWP, no LE edema; right foot dressed in clean, dry gauze   Neuro: AOx3, no gross focal neurological deficits  Psych: affect and behavior appropriate, pleasant at time of interview    LABS:              CAPILLARY BLOOD GLUCOSE      POCT Blood Glucose.: 138 mg/dL (28 Nov 2021 12:34)  POCT Blood Glucose.: 191 mg/dL (28 Nov 2021 06:47)  POCT Blood Glucose.: 190 mg/dL (27 Nov 2021 21:53)  POCT Blood Glucose.: 195 mg/dL (27 Nov 2021 17:20)              MEDICATIONS  (STANDING):  ascorbic acid 500 milliGRAM(s) Oral daily  aspirin enteric coated 81 milliGRAM(s) Oral daily  atorvastatin 40 milliGRAM(s) Oral at bedtime  ceFAZolin   IVPB 2000 milliGRAM(s) IV Intermittent every 8 hours  ceFAZolin   IVPB      chlorhexidine 2% Cloths 1 Application(s) Topical <User Schedule>  clopidogrel Tablet 75 milliGRAM(s) Oral daily  dextrose 40% Gel 15 Gram(s) Oral once  dextrose 5%. 1000 milliLiter(s) (50 mL/Hr) IV Continuous <Continuous>  dextrose 5%. 1000 milliLiter(s) (100 mL/Hr) IV Continuous <Continuous>  dextrose 50% Injectable 25 Gram(s) IV Push once  dextrose 50% Injectable 12.5 Gram(s) IV Push once  dextrose 50% Injectable 25 Gram(s) IV Push once  ertapenem  IVPB 1000 milliGRAM(s) IV Intermittent every 24 hours  glucagon  Injectable 1 milliGRAM(s) IntraMuscular once  heparin   Injectable 5000 Unit(s) SubCutaneous every 8 hours  insulin glargine Injectable (LANTUS) 46 Unit(s) SubCutaneous at bedtime  insulin lispro (ADMELOG) corrective regimen sliding scale   SubCutaneous Before meals and at bedtime  insulin lispro Injectable (ADMELOG) 8 Unit(s) SubCutaneous before breakfast  insulin lispro Injectable (ADMELOG) 8 Unit(s) SubCutaneous before lunch  insulin lispro Injectable (ADMELOG) 14 Unit(s) SubCutaneous before dinner  lisinopril 20 milliGRAM(s) Oral daily  melatonin 3 milliGRAM(s) Oral at bedtime  multivitamin 1 Tablet(s) Oral daily    MEDICATIONS  (PRN):  acetaminophen     Tablet .. 650 milliGRAM(s) Oral every 6 hours PRN Moderate Pain (4 - 6)  sodium chloride 0.9% lock flush 10 milliLiter(s) IV Push every 1 hour PRN Pre/post blood products, medications, blood draw, and to maintain line patency

## 2021-11-28 NOTE — DISCHARGE NOTE PROVIDER - NSDCMRMEDTOKEN_GEN_ALL_CORE_FT
ascorbic acid 500 mg oral tablet: 1 tab(s) orally once a day  aspirin 81 mg oral delayed release tablet: 1 tab(s) orally once a day  atorvastatin 40 mg oral tablet: 1 tab(s) orally once a day  clopidogrel 75 mg oral tablet: 1 tab(s) orally once a day  HumaLOG 100 units/mL injectable solution: 8 unit(s) subcutaneous 3 times a day (before meals)    Lantus 100 units/mL subcutaneous solution: 44 unit(s) subcutaneous once a day (at bedtime)  lisinopril 20 mg oral tablet: 1 tab(s) orally once a day  metFORMIN 1000 mg oral tablet: 1 tab(s) orally 2 times a day  Multiple Vitamins oral tablet: 1 tab(s) orally once a day   ascorbic acid 500 mg oral tablet: 1 tab(s) orally once a day  aspirin 81 mg oral delayed release tablet: 1 tab(s) orally once a day  atorvastatin 40 mg oral tablet: 1 tab(s) orally once a day  ceFAZolin 2 g/50 mL-NaCl 0.9% intravenous solution: 2g IV, every 8 hours End Date 1/6/2022   clopidogrel 75 mg oral tablet: 1 tab(s) orally once a day  ertapenem 1 g injection: 1 gram(s) intravenously once a day.  End date 1/6/22  heparin: Heparin 10U/mL 3mL Post Infusion  HumaLOG 100 units/mL injectable solution: 8 unit(s) subcutaneous 3 times a day (before meals)    Lantus 100 units/mL subcutaneous solution: 44 unit(s) subcutaneous once a day (at bedtime)  lisinopril 20 mg oral tablet: 1 tab(s) orally once a day  metFORMIN 1000 mg oral tablet: 1 tab(s) orally 2 times a day  Multiple Vitamins oral tablet: 1 tab(s) orally once a day  Normal saline flush 0.9%: Normal Saline 0.9% 10mL, please flush PICC line pre and post infusion  Weekly Labs: Weekly labs while on IV antibiotics:  CBC, CMP, ESR and CRP     fax to Dr. Leeann Walter at [phone]   acetaminophen 325 mg oral tablet: 2 tab(s) orally every 6 hours, As needed, Moderate Pain (4 - 6)  ascorbic acid 500 mg oral tablet: 1 tab(s) orally once a day  aspirin 81 mg oral delayed release tablet: 1 tab(s) orally once a day  atorvastatin 40 mg oral tablet: 1 tab(s) orally once a day  ceFAZolin 2 g/50 mL-NaCl 0.9% intravenous solution: 2g IV, every 8 hours End Date 1/6/2022   clopidogrel 75 mg oral tablet: 1 tab(s) orally once a day  ertapenem 1 g injection: 1 gram(s) intravenously once a day.  End date 1/6/22  heparin: Heparin 10U/mL 3mL Post Infusion  HumaLOG 100 units/mL injectable solution: 8 unit(s) subcutaneous 3 times a day (before meals)    Lantus 100 units/mL subcutaneous solution: 44 unit(s) subcutaneous once a day (at bedtime)  lisinopril 20 mg oral tablet: 1 tab(s) orally once a day  metFORMIN 1000 mg oral tablet: 1 tab(s) orally 2 times a day  Multiple Vitamins oral tablet: 1 tab(s) orally once a day  Normal saline flush 0.9%: Normal Saline 0.9% 10mL, please flush PICC line pre and post infusion  Weekly Labs: Weekly labs while on IV antibiotics:  CBC, CMP, ESR and CRP     fax to Dr. Leeann Walter at [phone]

## 2021-11-28 NOTE — DISCHARGE NOTE PROVIDER - HOSPITAL COURSE
45 y/o M pt with PMHx of DM, CAD, HTN, HLD, with most recently s/p amputation of R 3rd toe for gangrene after stepping on a nail 9/28/21 followed by 3rd toe ray amputation 10/28/21 and RLE angiogram with AT and PT stents on 10/29/21 he presents today with new redness and swelling of right 4th toe.  He was discharged 11/1/21 with wound VAC to the amputation site which has been changed by visiting nurses 3x a week. Because of location of the wound VAC he has been getting increased irritation of the surrounding 4th toe along with skin blistering and swelling due to pressure from the tape. He was seen at Dr. Cary’s office and noted to have erythema and edema of the 4th toe, he was referred to [facility] ED for admission and IV abx. Patient was admitted to the Vascular Surgery Service. IV abx zosyn and vancomycin were administered. An ID consultation was sought.    47 y/o M pt with PMHx of DM, CAD, HTN, HLD, with most recently s/p amputation of R 3rd toe for gangrene after stepping on a nail 9/28/21 followed by 3rd toe ray amputation 10/28/21 and RLE angiogram with AT and PT stents on 10/29/21 he presents today with new redness and swelling of right 4th toe.  He was discharged 11/1/21 with wound VAC to the amputation site which has been changed by visiting nurses 3x a week. Because of location of the wound VAC he has been getting increased irritation of the surrounding 4th toe along with skin blistering and swelling due to pressure from the tape. He was seen at Dr. Cary’s office and noted to have erythema and edema of the 4th toe, he was referred to [facility] ED for admission and IV abx. Patient was admitted to the Vascular Surgery Service. IV abx zosyn and vancomycin were administered. An ID consultation was sought, ultimately recommending cefazolin and ertapenem for treatment of ESBL Klebsiella, MSSA, GBS, and Provotella. Duration of antibiotics will continue for 6 weeks. Wound care was administered in the form of wet to dry dressing changes with dakins solution.  Upon discharge patient is tolerating diet, with pain well controlled. A PICC line is in place for continued antibiotic administration and he is deemed safe for discharge with close follow up and continued abx and wound care.

## 2021-11-28 NOTE — PROGRESS NOTE ADULT - ASSESSMENT
46YOM with history of obesity (BMI 31.4), insulin-dependent type 2 diabetes (a1c Sept 2021 11.5%, started on insulin at that time), PAD, essential HTN, CAD s/p PCI, HLD, recent R 3rd toe amputation complicated by surgical site infection with wound vac placed in October, now admitted to vascular surgery service for wound infection.    # R 3rd toe diabetic foot infection  # Peripheral arterial disease  Initial injury occurred after stepping on nail at work. s/p amputation of R 3rd toe 9/29, though has had issues with postoperative surgical site infection in October which was debrided. Now admitted with cellulitis in R foot around area of wound VAC site.  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  Will be resistant to pip-tazo.  pt placed on ertapenem 1 g IV q24h and Continue cefazolin 2 g IV q8h; 6 weeks total-(11/26-1/6)  -PICC in place   -ASA/statin/plavix    # Insulin dependent type 2 diabetes complicated by peripheral arterial disease and diabetic foot infection   Hgb a1c 11.5% Sept 2021 - started on insulin at that time. Follows with Dr. Linder. Home meds include metformin, Lantus 44U qhs, lispro 14U with breakfast, 8U with lunch and dinner  -reinforced diet compliance and close follow up with endocrine dr sierra as well as pmd  -home metformin on hold  -continue home insulin   -antibiotics in NS not D5  -FSBG qac/qhs with SSI    # Essential hypertension  Takes lisinopril 20mg once daily at home. BP at goal  -continue home medications    # CAD s/p PCI - continue home ASA/statin/plavix  # Hyperlipidemia - continue home statin  # Obesity - BMI is 31.4, affects all aspects of care; dose medications by weight     Dispo: pending clinical improvement and antibiotic plan as per ID, reinforced importance of timely follow up with PMD Dr Mcgill and endocrne Dr Sierra, as well as surgical team and ID, pt verablized understanding, pt medically optimized for dc home wiht close follow up for further management and care

## 2021-11-28 NOTE — DISCHARGE NOTE PROVIDER - CARE PROVIDERS DIRECT ADDRESSES
[email or web address]

## 2021-11-28 NOTE — PROGRESS NOTE ADULT - SUBJECTIVE AND OBJECTIVE BOX
11/27: Downgraded to ertapenem 1g Q24 per Dr. Walter. Dakins started      SUBJECTIVE:       MEDICATIONS  (STANDING):  ascorbic acid 500 milliGRAM(s) Oral daily  aspirin enteric coated 81 milliGRAM(s) Oral daily  atorvastatin 40 milliGRAM(s) Oral at bedtime  ceFAZolin   IVPB 2000 milliGRAM(s) IV Intermittent every 8 hours  ceFAZolin   IVPB      chlorhexidine 2% Cloths 1 Application(s) Topical <User Schedule>  clopidogrel Tablet 75 milliGRAM(s) Oral daily  dextrose 40% Gel 15 Gram(s) Oral once  dextrose 5%. 1000 milliLiter(s) (50 mL/Hr) IV Continuous <Continuous>  dextrose 5%. 1000 milliLiter(s) (100 mL/Hr) IV Continuous <Continuous>  dextrose 50% Injectable 25 Gram(s) IV Push once  dextrose 50% Injectable 12.5 Gram(s) IV Push once  dextrose 50% Injectable 25 Gram(s) IV Push once  ertapenem  IVPB 1000 milliGRAM(s) IV Intermittent every 24 hours  glucagon  Injectable 1 milliGRAM(s) IntraMuscular once  heparin   Injectable 5000 Unit(s) SubCutaneous every 8 hours  insulin glargine Injectable (LANTUS) 46 Unit(s) SubCutaneous at bedtime  insulin lispro (ADMELOG) corrective regimen sliding scale   SubCutaneous Before meals and at bedtime  insulin lispro Injectable (ADMELOG) 8 Unit(s) SubCutaneous before breakfast  insulin lispro Injectable (ADMELOG) 8 Unit(s) SubCutaneous before lunch  insulin lispro Injectable (ADMELOG) 14 Unit(s) SubCutaneous before dinner  lisinopril 20 milliGRAM(s) Oral daily  melatonin 3 milliGRAM(s) Oral at bedtime  multivitamin 1 Tablet(s) Oral daily    MEDICATIONS  (PRN):  acetaminophen     Tablet .. 650 milliGRAM(s) Oral every 6 hours PRN Moderate Pain (4 - 6)  sodium chloride 0.9% lock flush 10 milliLiter(s) IV Push every 1 hour PRN Pre/post blood products, medications, blood draw, and to maintain line patency      Vital Signs Last 24 Hrs  T(C): 36.9 (28 Nov 2021 05:22), Max: 36.9 (27 Nov 2021 13:54)  T(F): 98.4 (28 Nov 2021 05:22), Max: 98.5 (27 Nov 2021 13:54)  HR: 67 (28 Nov 2021 05:35) (67 - 78)  BP: 97/62 (28 Nov 2021 05:35) (97/62 - 129/78)  BP(mean): 74 (28 Nov 2021 05:35) (71 - 76)  RR: 20 (28 Nov 2021 05:35) (18 - 20)  SpO2: 97% (28 Nov 2021 05:35) (94% - 98%)    Physical Exam:  General: No acute distress, resting comfortably in bed  C/V: normal sinus rhythm  Pulm: Nonlabored breathing, no respiratory distress  Abd: soft, non-tender, non-distended.  Extrem: warm and well perfused, no edema. R 3rd digit amputation site with some fibrinous exudate present. No signs of erythema or pus.    I&O's Summary    26 Nov 2021 07:01  -  27 Nov 2021 07:00  --------------------------------------------------------  IN: 600 mL / OUT: 0 mL / NET: 600 mL    27 Nov 2021 07:01  -  28 Nov 2021 06:40  --------------------------------------------------------  IN: 636.6 mL / OUT: 0 mL / NET: 636.6 mL        LABS:              CAPILLARY BLOOD GLUCOSE      POCT Blood Glucose.: 190 mg/dL (27 Nov 2021 21:53)  POCT Blood Glucose.: 195 mg/dL (27 Nov 2021 17:20)  POCT Blood Glucose.: 157 mg/dL (27 Nov 2021 12:19)        RADIOLOGY & ADDITIONAL STUDIES:        ---------------------------------------------------------------------------  PLEASE CHECK WHEN PRESENT:  [  ] Heart Failure  [  ] Acute  [  ] Acute on Chronic  [  ] Chronic  -------------------------------------------------------------------  [  ]Diastolic [HFpEF]  [  ]Systolic [HFrEF]  [  ]Combined [HFpEF & HFrEF]  [  ] afib  [x] hypertensive heart disease  [  ]Other:  -------------------------------------------------------------------  [ ] Respiratory failure  [ ] Acute cor pulmonale  [ ] Asthma/COPD Exacerbation  [ ] Pleural effusion  [ ] Aspiration pneumonia  -------------------------------------------------------------------  [  ]ROSANNE  [  ]ATN  [  ]Reneal Medullary Necrosis  [  ]Renal Cortical Necrosis  [  ]Other Pathological Lesions:    [  ]CKD 1  [  ]CKD 2  [  ]CKD 3  [  ]CKD 4  [  ]CKD 5  [  ]Other  -------------------------------------------------------------------  [x]Diabetes  [x] Diabetic PVD Ulcer  [  ] Neuropathic ulcer to DM  [  ] Diabetes with Nephropathy  [  ] Osteomyelitis due to diabetes  --------------------------------------------------------------------  [  ]Malnutrition: See Nutrition Note  [  ]Cachexia  [  ]Other:   [  ]Supplement Ordered:  [  ]Morbid Obesity (BMI >=40]  ---------------------------------------------------------------------  [ ] Sepsis/severe sepsis/septic shock  [ ] UTI  [ ] Pneumonia  -----------------------------------------------------------------------  [ ] Acidosis/alkalosis  [ ] Fluid overload  [ ] Hypokalemia  [ ] Hyperkalemia  [ ] Hypomagnesemia  [ ] Hypophosphatemia  [ ] Hyperphosphatemia  ------------------------------------------------------------------------  [ ] Acute blood loss anemia  [ ] Post op blood loss anemia  [ ] Iron deficiency anemia  [ ] Anemia due to chronic disease  [ ] Hypercoagulable state  ----------------------------------------------------------------------  [ ] Cerebral infarction  [ ] Transient ischemia attack  [ ] Encephalopathy      Assessment  47 y/o M pt with PMHx of DM, CAD, HTN, HLD, with most recently s/p amputation of R 3rd toe for gangrene after stepping on a nail 9/28/21 followed by 3rd toe ray amputation 10/28/21 and RLE angiogram with AT and PT stents on 10/29/21 he presents today with re-infection of the wound with right 4th toe cellulitis.     Vascular/PAD:  -Diabetic right foot infection  -WTD dressing  -c/w ASA and Plavix    HTN/HLD:  -c/w lisinopril  -c/w atorvastatin    DM:  -ISS  -Lispro 8u in Am, 8u lunch and 14u w/dinner, lantus 44u at bedtime    ID:  -Vanco and Zosyn dc'd 11/26  -Cefazolin and meropenem started 11/27  -Pending final surgical swab cultures sensitivities   - Will need 6 wks of abx    Diet: Diabetic diet    Activity: OOB as tolerated    DVTPPx: HSQ    Dispo: pending cultures and final sensitivities, clinical improvement 11/27: Downgraded to ertapenem 1g Q24 per Dr. Walter. Dakins started      SUBJECTIVE: No specific complaints overnight.       MEDICATIONS  (STANDING):  ascorbic acid 500 milliGRAM(s) Oral daily  aspirin enteric coated 81 milliGRAM(s) Oral daily  atorvastatin 40 milliGRAM(s) Oral at bedtime  ceFAZolin   IVPB 2000 milliGRAM(s) IV Intermittent every 8 hours  ceFAZolin   IVPB      chlorhexidine 2% Cloths 1 Application(s) Topical <User Schedule>  clopidogrel Tablet 75 milliGRAM(s) Oral daily  dextrose 40% Gel 15 Gram(s) Oral once  dextrose 5%. 1000 milliLiter(s) (50 mL/Hr) IV Continuous <Continuous>  dextrose 5%. 1000 milliLiter(s) (100 mL/Hr) IV Continuous <Continuous>  dextrose 50% Injectable 25 Gram(s) IV Push once  dextrose 50% Injectable 12.5 Gram(s) IV Push once  dextrose 50% Injectable 25 Gram(s) IV Push once  ertapenem  IVPB 1000 milliGRAM(s) IV Intermittent every 24 hours  glucagon  Injectable 1 milliGRAM(s) IntraMuscular once  heparin   Injectable 5000 Unit(s) SubCutaneous every 8 hours  insulin glargine Injectable (LANTUS) 46 Unit(s) SubCutaneous at bedtime  insulin lispro (ADMELOG) corrective regimen sliding scale   SubCutaneous Before meals and at bedtime  insulin lispro Injectable (ADMELOG) 8 Unit(s) SubCutaneous before breakfast  insulin lispro Injectable (ADMELOG) 8 Unit(s) SubCutaneous before lunch  insulin lispro Injectable (ADMELOG) 14 Unit(s) SubCutaneous before dinner  lisinopril 20 milliGRAM(s) Oral daily  melatonin 3 milliGRAM(s) Oral at bedtime  multivitamin 1 Tablet(s) Oral daily    MEDICATIONS  (PRN):  acetaminophen     Tablet .. 650 milliGRAM(s) Oral every 6 hours PRN Moderate Pain (4 - 6)  sodium chloride 0.9% lock flush 10 milliLiter(s) IV Push every 1 hour PRN Pre/post blood products, medications, blood draw, and to maintain line patency      Vital Signs Last 24 Hrs  T(C): 36.9 (28 Nov 2021 05:22), Max: 36.9 (27 Nov 2021 13:54)  T(F): 98.4 (28 Nov 2021 05:22), Max: 98.5 (27 Nov 2021 13:54)  HR: 67 (28 Nov 2021 05:35) (67 - 78)  BP: 97/62 (28 Nov 2021 05:35) (97/62 - 129/78)  BP(mean): 74 (28 Nov 2021 05:35) (71 - 76)  RR: 20 (28 Nov 2021 05:35) (18 - 20)  SpO2: 97% (28 Nov 2021 05:35) (94% - 98%)    Physical Exam:  General: No acute distress, resting comfortably in bed  C/V: normal sinus rhythm  Pulm: Nonlabored breathing, no respiratory distress  Abd: soft, non-tender, non-distended.  Extrem: warm and well perfused, no edema. R 3rd digit amputation site with some fibrinous exudate present. No signs of erythema or pus.    I&O's Summary    26 Nov 2021 07:01  -  27 Nov 2021 07:00  --------------------------------------------------------  IN: 600 mL / OUT: 0 mL / NET: 600 mL    27 Nov 2021 07:01  -  28 Nov 2021 06:40  --------------------------------------------------------  IN: 636.6 mL / OUT: 0 mL / NET: 636.6 mL        LABS:              CAPILLARY BLOOD GLUCOSE      POCT Blood Glucose.: 190 mg/dL (27 Nov 2021 21:53)  POCT Blood Glucose.: 195 mg/dL (27 Nov 2021 17:20)  POCT Blood Glucose.: 157 mg/dL (27 Nov 2021 12:19)        RADIOLOGY & ADDITIONAL STUDIES:        ---------------------------------------------------------------------------  PLEASE CHECK WHEN PRESENT:  [  ] Heart Failure  [  ] Acute  [  ] Acute on Chronic  [  ] Chronic  -------------------------------------------------------------------  [  ]Diastolic [HFpEF]  [  ]Systolic [HFrEF]  [  ]Combined [HFpEF & HFrEF]  [  ] afib  [x] hypertensive heart disease  [  ]Other:  -------------------------------------------------------------------  [ ] Respiratory failure  [ ] Acute cor pulmonale  [ ] Asthma/COPD Exacerbation  [ ] Pleural effusion  [ ] Aspiration pneumonia  -------------------------------------------------------------------  [  ]ROSANNE  [  ]ATN  [  ]Reneal Medullary Necrosis  [  ]Renal Cortical Necrosis  [  ]Other Pathological Lesions:    [  ]CKD 1  [  ]CKD 2  [  ]CKD 3  [  ]CKD 4  [  ]CKD 5  [  ]Other  -------------------------------------------------------------------  [x]Diabetes  [x] Diabetic PVD Ulcer  [  ] Neuropathic ulcer to DM  [  ] Diabetes with Nephropathy  [  ] Osteomyelitis due to diabetes  --------------------------------------------------------------------  [  ]Malnutrition: See Nutrition Note  [  ]Cachexia  [  ]Other:   [  ]Supplement Ordered:  [  ]Morbid Obesity (BMI >=40]  ---------------------------------------------------------------------  [ ] Sepsis/severe sepsis/septic shock  [ ] UTI  [ ] Pneumonia  -----------------------------------------------------------------------  [ ] Acidosis/alkalosis  [ ] Fluid overload  [ ] Hypokalemia  [ ] Hyperkalemia  [ ] Hypomagnesemia  [ ] Hypophosphatemia  [ ] Hyperphosphatemia  ------------------------------------------------------------------------  [ ] Acute blood loss anemia  [ ] Post op blood loss anemia  [ ] Iron deficiency anemia  [ ] Anemia due to chronic disease  [ ] Hypercoagulable state  ----------------------------------------------------------------------  [ ] Cerebral infarction  [ ] Transient ischemia attack  [ ] Encephalopathy      Assessment  47 y/o M pt with PMHx of DM, CAD, HTN, HLD, s/p amputation of R 3rd toe, 3rd toe ray amputation 10/28/21 and RLE angiogram with AT and PT stents admitted for cellulitis    Vascular/PAD:  -Diabetic right foot infection  -WTD dressing  -c/w ASA and Plavix    HTN/HLD:  -c/w lisinopril  -c/w atorvastatin    DM:  -ISS  -Lispro 8u in Am, 8u lunch and 14u w/dinner, lantus 44u at bedtime    ID:  -Vanco and Zosyn dc'd 11/26  -Cefazolin and meropenem started 11/27, transitioned to cefazolin, ertapenem (11/27)  -Pending final surgical swab cultures sensitivities   - Will need 6 wks of abx    Diet: Diabetic diet    Activity: OOB as tolerated    DVTPPx: HSQ    Dispo: pending cultures and final sensitivities, clinical improvement

## 2021-11-28 NOTE — DISCHARGE NOTE PROVIDER - PROVIDER TOKENS
PROVIDER:[TOKEN:[[id number]],FOLLOWUP:[1 week]] PROVIDER:[TOKEN:[[id number]],FOLLOWUP:[1 week]],PROVIDER:[TOKEN:[[id number]],FOLLOWUP:[2 weeks]]

## 2021-11-28 NOTE — PROGRESS NOTE ADULT - TIME BILLING
care coordination, plan of care discussed with patient face to face
care coordination, plan of care discussed with patient face to face

## 2021-11-28 NOTE — PROGRESS NOTE ADULT - SUBJECTIVE AND OBJECTIVE BOX
INTERVAL HPI/OVERNIGHT EVENTS:  Remains afebrile, no foot pain    CONSTITUTIONAL:  No fever, chills, night sweats  EYES:  No photophobia or visual changes  CARDIOVASCULAR:  No chest pain  RESPIRATORY:  No cough, wheezing, or SOB   GASTROINTESTINAL:  No nausea, vomiting, diarrhea, constipation, or abdominal pain  GENITOURINARY:  No frequency, urgency, dysuria or hematuria  NEUROLOGIC:  No headache, lightheadedness      ANTIBIOTICS/RELEVANT:    Cefazolin 2 g IV q8h (11/26-present)  Ertapenem 1 g IV q24h (11/27-present)    Vancomycin 11/23-11/26  Pip-tazo 11/23-11/26  Meropenem 11/26-11/27      Vital Signs Last 24 Hrs  T(C): 36.1 (28 Nov 2021 14:43), Max: 36.9 (28 Nov 2021 05:22)  T(F): 97 (28 Nov 2021 14:43), Max: 98.4 (28 Nov 2021 05:22)  HR: 81 (28 Nov 2021 09:00) (67 - 81)  BP: 121/80 (28 Nov 2021 09:00) (97/62 - 121/80)  BP(mean): 95 (28 Nov 2021 09:00) (71 - 95)  RR: 16 (28 Nov 2021 09:00) (16 - 20)  SpO2: 96% (28 Nov 2021 09:00) (96% - 98%)    PHYSICAL EXAM:  Constitutional:  Well-developed, well nourished  Eyes:  Sclerae anicteric, conjunctivae clear, PERRL  Ear/Nose/Throat:  No nasal exudate or sinus tenderness;  No buccal mucosal lesions, no pharyngeal erythema or exudate	  Neck:  Supple, no adenopathy  Respiratory:  Clear bilaterally  Cardiovascular:  RRR, S1S2, no murmur appreciated  Gastrointestinal:  Symmetric, normoactive BS, soft, NT, no masses, guarding or rebound.  No HSM  Extremities:  No edema.  RUE PICC exit site with Biopatch.  R foot dressed      LABS:                    MICROBIOLOGY:        RADIOLOGY & ADDITIONAL STUDIES:

## 2021-11-29 ENCOUNTER — TRANSCRIPTION ENCOUNTER (OUTPATIENT)
Age: 46
End: 2021-11-29

## 2021-11-29 VITALS — TEMPERATURE: 98 F

## 2021-11-29 LAB
CULTURE RESULTS: SIGNIFICANT CHANGE UP
GLUCOSE BLDC GLUCOMTR-MCNC: 192 MG/DL — HIGH (ref 70–99)
GLUCOSE BLDC GLUCOMTR-MCNC: 194 MG/DL — HIGH (ref 70–99)
GLUCOSE BLDC GLUCOMTR-MCNC: 199 MG/DL — HIGH (ref 70–99)
ORGANISM # SPEC MICROSCOPIC CNT: SIGNIFICANT CHANGE UP
SPECIMEN SOURCE: SIGNIFICANT CHANGE UP

## 2021-11-29 PROCEDURE — 80202 ASSAY OF VANCOMYCIN: CPT

## 2021-11-29 PROCEDURE — 85025 COMPLETE CBC W/AUTO DIFF WBC: CPT

## 2021-11-29 PROCEDURE — 36573 INSJ PICC RS&I 5 YR+: CPT

## 2021-11-29 PROCEDURE — 36415 COLL VENOUS BLD VENIPUNCTURE: CPT

## 2021-11-29 PROCEDURE — 84100 ASSAY OF PHOSPHORUS: CPT

## 2021-11-29 PROCEDURE — 73630 X-RAY EXAM OF FOOT: CPT

## 2021-11-29 PROCEDURE — 82962 GLUCOSE BLOOD TEST: CPT

## 2021-11-29 PROCEDURE — 87040 BLOOD CULTURE FOR BACTERIA: CPT

## 2021-11-29 PROCEDURE — 85730 THROMBOPLASTIN TIME PARTIAL: CPT

## 2021-11-29 PROCEDURE — 83605 ASSAY OF LACTIC ACID: CPT

## 2021-11-29 PROCEDURE — 85610 PROTHROMBIN TIME: CPT

## 2021-11-29 PROCEDURE — 99232 SBSQ HOSP IP/OBS MODERATE 35: CPT

## 2021-11-29 PROCEDURE — 83735 ASSAY OF MAGNESIUM: CPT

## 2021-11-29 PROCEDURE — 80053 COMPREHEN METABOLIC PANEL: CPT

## 2021-11-29 PROCEDURE — 85027 COMPLETE CBC AUTOMATED: CPT

## 2021-11-29 PROCEDURE — 87635 SARS-COV-2 COVID-19 AMP PRB: CPT

## 2021-11-29 PROCEDURE — 80048 BASIC METABOLIC PNL TOTAL CA: CPT

## 2021-11-29 PROCEDURE — 86769 SARS-COV-2 COVID-19 ANTIBODY: CPT

## 2021-11-29 PROCEDURE — 99285 EMERGENCY DEPT VISIT HI MDM: CPT

## 2021-11-29 RX ORDER — ACETAMINOPHEN 500 MG
2 TABLET ORAL
Qty: 0 | Refills: 0 | DISCHARGE
Start: 2021-11-29

## 2021-11-29 RX ADMIN — Medication 100 MILLIGRAM(S): at 06:20

## 2021-11-29 RX ADMIN — Medication 500 MILLIGRAM(S): at 11:35

## 2021-11-29 RX ADMIN — CLOPIDOGREL BISULFATE 75 MILLIGRAM(S): 75 TABLET, FILM COATED ORAL at 11:35

## 2021-11-29 RX ADMIN — Medication 2: at 08:11

## 2021-11-29 RX ADMIN — LISINOPRIL 20 MILLIGRAM(S): 2.5 TABLET ORAL at 06:29

## 2021-11-29 RX ADMIN — Medication 1 TABLET(S): at 11:34

## 2021-11-29 RX ADMIN — CHLORHEXIDINE GLUCONATE 1 APPLICATION(S): 213 SOLUTION TOPICAL at 06:22

## 2021-11-29 RX ADMIN — Medication 8 UNIT(S): at 08:11

## 2021-11-29 RX ADMIN — HEPARIN SODIUM 5000 UNIT(S): 5000 INJECTION INTRAVENOUS; SUBCUTANEOUS at 06:30

## 2021-11-29 RX ADMIN — Medication 81 MILLIGRAM(S): at 11:35

## 2021-11-29 NOTE — DISCHARGE NOTE NURSING/CASE MANAGEMENT/SOCIAL WORK - NURSING SECTION COMPLETE
Physical Exam  Mallampati: II  TM Distance: >3 FB  Neck ROM: Full  cardiovascular exam normal  pulmonary exam normal  Patient Demonstrates:  Obese  Patient has:  Upper dentures and Lower dentures      Anesthesia Plan  ASA Status: 3  Anesthesia Type: General  Induction: Intravenous  Preferred Airway Type: ETT  Reviewed: Lab Results, Nursing Notes, Pre-Induction Reassessment, Beta Blocker Status, NPO Status, Medications, Past Med History, Problem List, Allergies, DNR Status, Patient Summary, Consultations and EKG  The proposed anesthetic plan, including its risks and benefits, have been discussed with the Patient - along with the risks and benefits of alternatives.  Questions were encouraged and answered and the patient and/or representative agrees to proceed.  Blood Products: Not Anticipated      Anesthesia ROS/Med Hx        Cardiovascular Review:    Pt. positive for CHF  Pt. positive for hypertension  Pt. positive for hyperlipidemia  Pt. positive for dysrhythmias - Paroxysmal A-fib    End/Other Review:    Pt. positive for obesity     
Patient/Caregiver provided printed discharge information.

## 2021-11-29 NOTE — PROGRESS NOTE ADULT - SUBJECTIVE AND OBJECTIVE BOX
Subjective/Interval events:  No acute overnight events. Pain well controlled, tolerating diet, says foot is feeling back to near normal. Hopeful to go home today, awaiting IV abx setup.    MEDICATIONS  (STANDING):  ascorbic acid 500 milliGRAM(s) Oral daily  aspirin enteric coated 81 milliGRAM(s) Oral daily  atorvastatin 40 milliGRAM(s) Oral at bedtime  ceFAZolin   IVPB 2000 milliGRAM(s) IV Intermittent every 8 hours  ceFAZolin   IVPB      chlorhexidine 2% Cloths 1 Application(s) Topical <User Schedule>  clopidogrel Tablet 75 milliGRAM(s) Oral daily  dextrose 40% Gel 15 Gram(s) Oral once  dextrose 5%. 1000 milliLiter(s) (50 mL/Hr) IV Continuous <Continuous>  dextrose 5%. 1000 milliLiter(s) (100 mL/Hr) IV Continuous <Continuous>  dextrose 50% Injectable 12.5 Gram(s) IV Push once  dextrose 50% Injectable 25 Gram(s) IV Push once  dextrose 50% Injectable 25 Gram(s) IV Push once  ertapenem  IVPB 1000 milliGRAM(s) IV Intermittent every 24 hours  glucagon  Injectable 1 milliGRAM(s) IntraMuscular once  heparin   Injectable 5000 Unit(s) SubCutaneous every 8 hours  insulin glargine Injectable (LANTUS) 46 Unit(s) SubCutaneous at bedtime  insulin lispro (ADMELOG) corrective regimen sliding scale   SubCutaneous Before meals and at bedtime  insulin lispro Injectable (ADMELOG) 8 Unit(s) SubCutaneous before breakfast  insulin lispro Injectable (ADMELOG) 8 Unit(s) SubCutaneous before lunch  insulin lispro Injectable (ADMELOG) 14 Unit(s) SubCutaneous before dinner  lisinopril 20 milliGRAM(s) Oral daily  melatonin 3 milliGRAM(s) Oral at bedtime  multivitamin 1 Tablet(s) Oral daily    MEDICATIONS  (PRN):  acetaminophen     Tablet .. 650 milliGRAM(s) Oral every 6 hours PRN Moderate Pain (4 - 6)  sodium chloride 0.9% lock flush 10 milliLiter(s) IV Push every 1 hour PRN Pre/post blood products, medications, blood draw, and to maintain line patency      Vital Signs Last 24 Hrs  T(C): 36.7 (29 Nov 2021 09:27), Max: 36.7 (29 Nov 2021 09:27)  T(F): 98.1 (29 Nov 2021 09:27), Max: 98.1 (29 Nov 2021 09:27)  HR: 73 (29 Nov 2021 08:13) (68 - 100)  BP: 130/74 (29 Nov 2021 08:13) (104/62 - 130/74)  BP(mean): 79 (28 Nov 2021 18:18) (79 - 85)  RR: 18 (29 Nov 2021 08:13) (16 - 18)  SpO2: 96% (29 Nov 2021 08:13) (94% - 96%)    PHYSICAL EXAM:  GENERAL: pleasant, appropriate, no acute distress. Participating appropriately in interview  HEENT - NC/AT, EOMI, PERLLA, oropharynx clear without exudate or erythema, moist mucus membranes  NECK: Soft, supple, No JVD, no lymphadenopathy  CHEST/LUNG: Clear to auscultation bilaterally; No rales, rhonchi, wheezing. Normal work of breathing, not tachypneic  HEART: Regular rate and rhythm; No murmurs, rubs, or gallops, normal s1/s2. Warm and well-perfused  ABDOMEN: obese, soft, Nontender, Nondistended. Normoactive bowel sounds.  EXTREMITIES:  2+ Peripheral Pulses, R foot wrapped in kerlix without saturation.   NEURO:  awake, alert, oriented to person, place, time, and situation. Cranial nerves intact, no motor or sensory deficits. Moves all extremities.  SKIN: No rashes or lesions. RUE PICC double lumen    LABS:  CAPILLARY BLOOD GLUCOSE      POCT Blood Glucose.: 192 mg/dL (29 Nov 2021 07:58)  POCT Blood Glucose.: 194 mg/dL (29 Nov 2021 06:35)  POCT Blood Glucose.: 128 mg/dL (28 Nov 2021 21:59)  POCT Blood Glucose.: 160 mg/dL (28 Nov 2021 16:40)  POCT Blood Glucose.: 138 mg/dL (28 Nov 2021 12:34)    RADIOLOGY & ADDITIONAL TESTS:  reviewed, none new

## 2021-11-29 NOTE — PROGRESS NOTE ADULT - PROVIDER SPECIALTY LIST ADULT
Vascular Surgery
Hospitalist
Vascular Surgery
Hospitalist
Infectious Disease
Vascular Surgery
Hospitalist
Hospitalist
Infectious Disease
Hospitalist

## 2021-11-29 NOTE — DISCUSSION/SUMMARY
[FreeTextEntry1] : 46 year old male with right third toe gangrene after stepping on a nail, poorly controlled DM. right third toe amputation on 9/29/21, metatarsal resection on 10/28/21 and angiogram PT/AT stent on 10/29/21. He went home with wound vac and nurse changing three times a week, now with new ulcer between 4/5th toe and foul odor, erythema and warmth suggestive of infection. Culture taken today and recommend he go to the ED for admission for IV abx and good wound care. Patient agrees.

## 2021-11-29 NOTE — PHYSICAL EXAM
[Respiratory Effort] : normal respiratory effort [Normal Heart Sounds] : normal heart sounds [2+] : right 2+ [1+] : right 1+ [Alert] : alert [Oriented to Person] : oriented to person [Oriented to Place] : oriented to place [Oriented to Time] : oriented to time [Calm] : calm [Ankle Swelling (On Exam)] : not present [Varicose Veins Of Lower Extremities] : not present [] : not present [de-identified] : well appearing [de-identified] : third toe amp site macerated with some fibrin and maceration and drainage- slightly smaller than last visit, foot is slightly erythematous and warm. New ulcer between 4/5toe.

## 2021-11-29 NOTE — PROGRESS NOTE ADULT - REASON FOR ADMISSION
Diabetic right foot infection

## 2021-11-29 NOTE — DISCHARGE NOTE NURSING/CASE MANAGEMENT/SOCIAL WORK - PATIENT PORTAL LINK FT
You can access the FollowMyHealth Patient Portal offered by [facility] by registering at the following website: [email or web address]. By joining [facility]’s FollowMyHealth portal, you will also be able to view your health information using other applications (apps) compatible with our system.

## 2021-11-29 NOTE — REASON FOR VISIT
[de-identified] : Right LE angiogram, AT stent and PT stent [de-identified] : 10/29/21 [de-identified] : 46 year old male with right third toe gangrene after stepping on a nail, poorly controlled DM. right third toe amputation on 9/29/21, metatarsal resection on 10/28/21 and angiogram PT/AT stent on 10/29/21. He went home with wound vac and nurse changing three times a week but has developed new ulcer between the 4/5th toe and foul odor the last few days.

## 2021-11-29 NOTE — PROGRESS NOTE ADULT - SUBJECTIVE AND OBJECTIVE BOX
O/N: JIM, VSS' dressing chg'd                                      --------------------------------------------------------------------------  PLEASE CHECK WHEN PRESENT:  [  ] Heart Failure  [  ] Acute  [  ] Acute on Chronic  [  ] Chronic  -------------------------------------------------------------------  [  ]Diastolic [HFpEF]  [  ]Systolic [HFrEF]  [  ]Combined [HFpEF & HFrEF]  [  ] afib  [x] hypertensive heart disease  [  ]Other:  -------------------------------------------------------------------  [ ] Respiratory failure  [ ] Acute cor pulmonale  [ ] Asthma/COPD Exacerbation  [ ] Pleural effusion  [ ] Aspiration pneumonia  -------------------------------------------------------------------  [  ]ROSANNE  [  ]ATN  [  ]Reneal Medullary Necrosis  [  ]Renal Cortical Necrosis  [  ]Other Pathological Lesions:    [  ]CKD 1  [  ]CKD 2  [  ]CKD 3  [  ]CKD 4  [  ]CKD 5  [  ]Other  -------------------------------------------------------------------  [x]Diabetes  [x] Diabetic PVD Ulcer  [  ] Neuropathic ulcer to DM  [  ] Diabetes with Nephropathy  [  ] Osteomyelitis due to diabetes  --------------------------------------------------------------------  [  ]Malnutrition: See Nutrition Note  [  ]Cachexia  [  ]Other:   [  ]Supplement Ordered:  [  ]Morbid Obesity (BMI >=40]  ---------------------------------------------------------------------  [ ] Sepsis/severe sepsis/septic shock  [ ] UTI  [ ] Pneumonia  -----------------------------------------------------------------------  [ ] Acidosis/alkalosis  [ ] Fluid overload  [ ] Hypokalemia  [ ] Hyperkalemia  [ ] Hypomagnesemia  [ ] Hypophosphatemia  [ ] Hyperphosphatemia  ------------------------------------------------------------------------  [ ] Acute blood loss anemia  [ ] Post op blood loss anemia  [ ] Iron deficiency anemia  [ ] Anemia due to chronic disease  [ ] Hypercoagulable state  ----------------------------------------------------------------------  [ ] Cerebral infarction  [ ] Transient ischemia attack  [ ] Encephalopathy      Assessment  45 y/o M pt with PMHx of DM, CAD, HTN, HLD, s/p amputation of R 3rd toe, 3rd toe ray amputation 10/28/21 and RLE angiogram with AT and PT stents admitted for cellulitis    Vascular/PAD:  -Diabetic right foot infection  -WTD dressing  -c/w ASA and Plavix    HTN/HLD:  -c/w lisinopril  -c/w atorvastatin    DM:  -ISS  -Lispro 8u in Am, 8u lunch and 14u w/dinner, lantus 44u at bedtime    ID:  -Cal and Zosyn dc'd 11/26  -Cefazolin and meropenem started 11/27, transitioned to cefazolin, ertapenem (11/27)  -Pending final surgical swab cultures sensitivities   - Will need 6 wks of abx    Diet: Diabetic diet    Activity: OOB as tolerated    DVTPPx: HSQ    Dispo: pending cultures and final sensitivities, clinical improvement     O/N: JIM, VSS' dressing chg'd    Subjective: No complaints.    ROS:   Denies Headache, blurred vision, Chest Pain, SOB, Abdominal pain, nausea or vomiting     Social   ceFAZolin   IVPB 2000  ceFAZolin   IVPB   ertapenem  IVPB 1000  aspirin enteric coated 81  ceFAZolin   IVPB 2000  ceFAZolin   IVPB   clopidogrel Tablet 75  ertapenem  IVPB 1000  heparin   Injectable 5000  lisinopril 20      Allergies    No Known Allergies    Intolerances        Vital Signs Last 24 Hrs  T(C): 36.2 (29 Nov 2021 05:55), Max: 36.6 (28 Nov 2021 09:06)  T(F): 97.2 (29 Nov 2021 05:55), Max: 97.8 (28 Nov 2021 09:06)  HR: 68 (29 Nov 2021 06:28) (68 - 100)  BP: 112/69 (29 Nov 2021 06:28) (104/62 - 121/80)  BP(mean): 79 (28 Nov 2021 18:18) (79 - 95)  RR: 18 (29 Nov 2021 06:28) (16 - 18)  SpO2: 95% (29 Nov 2021 06:28) (94% - 96%)  I&O's Summary    28 Nov 2021 07:01  -  29 Nov 2021 07:00  --------------------------------------------------------  IN: 700 mL / OUT: 0 mL / NET: 700 mL        Physical Exam:  General: No acute distress, resting comfortably in bed  C/V: normal sinus rhythm  Pulm: Nonlabored breathing, no respiratory distress  Abd: soft, non-tender, non-distended.  Extrem: warm and well perfused, no edema. R 3rd digit amputation site with some fibrinous exudate present. No signs of erythema or pus.      --------------------------------------------------------------------------  PLEASE CHECK WHEN PRESENT:  [  ] Heart Failure  [  ] Acute  [  ] Acute on Chronic  [  ] Chronic  -------------------------------------------------------------------  [  ]Diastolic [HFpEF]  [  ]Systolic [HFrEF]  [  ]Combined [HFpEF & HFrEF]  [  ] afib  [x] hypertensive heart disease  [  ]Other:  -------------------------------------------------------------------  [ ] Respiratory failure  [ ] Acute cor pulmonale  [ ] Asthma/COPD Exacerbation  [ ] Pleural effusion  [ ] Aspiration pneumonia  -------------------------------------------------------------------  [  ]ROSANNE  [  ]ATN  [  ]Reneal Medullary Necrosis  [  ]Renal Cortical Necrosis  [  ]Other Pathological Lesions:    [  ]CKD 1  [  ]CKD 2  [  ]CKD 3  [  ]CKD 4  [  ]CKD 5  [  ]Other  -------------------------------------------------------------------  [x]Diabetes  [x] Diabetic PVD Ulcer  [  ] Neuropathic ulcer to DM  [  ] Diabetes with Nephropathy  [  ] Osteomyelitis due to diabetes  [x] hyperglycemia  --------------------------------------------------------------------  [  ]Malnutrition: See Nutrition Note  [  ]Cachexia  [  ]Other:   [  ]Supplement Ordered:  [  ]Morbid Obesity (BMI >=40]  ---------------------------------------------------------------------  [ ] Sepsis/severe sepsis/septic shock  [ ] UTI  [ ] Pneumonia  -----------------------------------------------------------------------  [ ] Acidosis/alkalosis  [ ] Fluid overload  [ ] Hypokalemia  [ ] Hyperkalemia  [ ] Hypomagnesemia  [ ] Hypophosphatemia  [ ] Hyperphosphatemia  ------------------------------------------------------------------------  [ ] Acute blood loss anemia  [ ] Post op blood loss anemia  [ ] Iron deficiency anemia  [ ] Anemia due to chronic disease  [ ] Hypercoagulable state  [x] Leukocytosis  ----------------------------------------------------------------------  [ ] Cerebral infarction  [ ] Transient ischemia attack  [ ] Encephalopathy      Assessment  45 y/o M pt with PMHx of DM, CAD, HTN, HLD, s/p amputation of R 3rd toe, 3rd toe ray amputation 10/28/21 and RLE angiogram with AT and PT stents admitted for cellulitis    Vascular/PAD:  -Diabetic right foot infection  -WTD dressing  -c/w ASA and Plavix    HTN/HLD:  -c/w lisinopril  -c/w atorvastatin    DM:  -ISS  -Lispro 8u in Am, 8u lunch and 14u w/dinner, lantus 44u at bedtime    ID:  -Vanco and Zosyn dc'd 11/26  -Cefazolin and meropenem started 11/27, transitioned to cefazolin, ertapenem (11/27)  -Pending final surgical swab cultures sensitivities   - Will need 6 wks of abx    Diet: Diabetic diet    Activity: OOB as tolerated    DVTPPx: HSQ    Dispo: d/c today pending home infusion set up

## 2021-11-29 NOTE — PROGRESS NOTE ADULT - ASSESSMENT
46YOM with history of obesity (BMI 31.4), insulin-dependent type 2 diabetes (a1c Sept 2021 11.5%, started on insulin at that time), PAD, essential HTN, CAD s/p PCI, HLD, recent R 3rd toe amputation complicated by surgical site infection with wound vac placed in October, now admitted to vascular surgery service for wound infection.    R 3rd toe diabetic foot infection  Peripheral arterial disease  Initial injury occurred after stepping on nail at work. s/p amputation of R 3rd toe 9/29, though has had issues with postoperative surgical site infection in October which was debrided. Now admitted with cellulitis in R foot around area of wound VAC site.  -seen by ID - pt placed on ertapenem 1 g IV q24h and Continue cefazolin 2 g IV q8h; 6 weeks total - (11/26-1/6)  -PICC in place   -ASA/statin/plavix    Insulin dependent type 2 diabetes   Hgb a1c 11.5% Sept 2021 - started on insulin at that time. Follows with Dr. Young. Home meds include metformin, Lantus 44U qhs, lispro 14U with breakfast, 8U with lunch and dinner  -reinforced diet compliance and close follow up with endocrine Dr. Young as well as PCP  -home metformin on hold  -continue home insulin   -antibiotics in NS not D5  -FSBG qac/qhs with SSI    Essential hypertension  Takes lisinopril 20mg once daily at home. BP at goal  -continue home medications    CAD s/p PCI - continue home ASA/statin/plavix  Hyperlipidemia - continue home statin  Obesity - BMI is 31.4, affects all aspects of care     Dispo: plan d/c home today pending confirmation of home IV abx

## 2021-11-30 DIAGNOSIS — L08.9 LOCAL INFECTION OF THE SKIN AND SUBCUTANEOUS TISSUE, UNSPECIFIED: ICD-10-CM

## 2021-11-30 DIAGNOSIS — I73.9 PERIPHERAL VASCULAR DISEASE, UNSPECIFIED: ICD-10-CM

## 2021-12-03 DIAGNOSIS — E11.628 TYPE 2 DIABETES MELLITUS WITH OTHER SKIN COMPLICATIONS: ICD-10-CM

## 2021-12-03 DIAGNOSIS — I10 ESSENTIAL (PRIMARY) HYPERTENSION: ICD-10-CM

## 2021-12-03 DIAGNOSIS — B96.1 KLEBSIELLA PNEUMONIAE [K. PNEUMONIAE] AS THE CAUSE OF DISEASES CLASSIFIED ELSEWHERE: ICD-10-CM

## 2021-12-03 DIAGNOSIS — E11.69 TYPE 2 DIABETES MELLITUS WITH OTHER SPECIFIED COMPLICATION: ICD-10-CM

## 2021-12-03 DIAGNOSIS — I70.201 UNSPECIFIED ATHEROSCLEROSIS OF NATIVE ARTERIES OF EXTREMITIES, RIGHT LEG: ICD-10-CM

## 2021-12-03 DIAGNOSIS — Z79.4 LONG TERM (CURRENT) USE OF INSULIN: ICD-10-CM

## 2021-12-03 DIAGNOSIS — B95.1 STREPTOCOCCUS, GROUP B, AS THE CAUSE OF DISEASES CLASSIFIED ELSEWHERE: ICD-10-CM

## 2021-12-03 DIAGNOSIS — E11.65 TYPE 2 DIABETES MELLITUS WITH HYPERGLYCEMIA: ICD-10-CM

## 2021-12-03 DIAGNOSIS — E66.9 OBESITY, UNSPECIFIED: ICD-10-CM

## 2021-12-03 DIAGNOSIS — Z89.421 ACQUIRED ABSENCE OF OTHER RIGHT TOE(S): ICD-10-CM

## 2021-12-03 DIAGNOSIS — B95.61 METHICILLIN SUSCEPTIBLE STAPHYLOCOCCUS AUREUS INFECTION AS THE CAUSE OF DISEASES CLASSIFIED ELSEWHERE: ICD-10-CM

## 2021-12-03 DIAGNOSIS — E78.5 HYPERLIPIDEMIA, UNSPECIFIED: ICD-10-CM

## 2021-12-03 DIAGNOSIS — L03.031 CELLULITIS OF RIGHT TOE: ICD-10-CM

## 2021-12-03 DIAGNOSIS — M86.8X7 OTHER OSTEOMYELITIS, ANKLE AND FOOT: ICD-10-CM

## 2021-12-03 DIAGNOSIS — E11.51 TYPE 2 DIABETES MELLITUS WITH DIABETIC PERIPHERAL ANGIOPATHY WITHOUT GANGRENE: ICD-10-CM

## 2021-12-03 DIAGNOSIS — L03.90 CELLULITIS, UNSPECIFIED: ICD-10-CM

## 2021-12-03 DIAGNOSIS — I25.10 ATHEROSCLEROTIC HEART DISEASE OF NATIVE CORONARY ARTERY WITHOUT ANGINA PECTORIS: ICD-10-CM

## 2021-12-03 DIAGNOSIS — L97.519 NON-PRESSURE CHRONIC ULCER OF OTHER PART OF RIGHT FOOT WITH UNSPECIFIED SEVERITY: ICD-10-CM

## 2021-12-03 DIAGNOSIS — E11.621 TYPE 2 DIABETES MELLITUS WITH FOOT ULCER: ICD-10-CM

## 2021-12-03 RX ORDER — BLOOD SUGAR DIAGNOSTIC
STRIP MISCELLANEOUS
Qty: 400 | Refills: 3 | Status: ACTIVE | COMMUNITY
Start: 2021-10-02 | End: 1900-01-01

## 2021-12-07 ENCOUNTER — APPOINTMENT (OUTPATIENT)
Dept: VASCULAR SURGERY | Facility: CLINIC | Age: 46
End: 2021-12-07
Payer: COMMERCIAL

## 2021-12-07 VITALS
HEIGHT: 75 IN | BODY MASS INDEX: 31.46 KG/M2 | WEIGHT: 253 LBS | SYSTOLIC BLOOD PRESSURE: 118 MMHG | DIASTOLIC BLOOD PRESSURE: 77 MMHG | HEART RATE: 82 BPM

## 2021-12-07 PROCEDURE — 99024 POSTOP FOLLOW-UP VISIT: CPT

## 2021-12-09 NOTE — END OF VISIT
[FreeTextEntry3] : I, Dr. Thoams Cary  have read and attest that all the information, medical decision making and discharge instructions within are true and accurate. I personally performed the evaluation and management (E/M) services for this established patient who presents today with (a) new problem(s)/exacerbation of (an) existing condition(s).  That E/M includes conducting the examination, assessing all new/exacerbated conditions, and establishing a new plan of care.  Today, my ACP, Yennifer Nassar PA-C, was here to observe my evaluation and management services for this new problem/exacerbated condition to be followed going forward.\par   [Time Spent: ___ minutes] : I have spent [unfilled] minutes of time on the encounter.

## 2021-12-09 NOTE — HISTORY OF PRESENT ILLNESS
[FreeTextEntry1] : 45 y/o M pt with PMHx of DM, CAD, HTN, HLD.\par \par 9/28/21- Stepped on a nail\par 10/28/21- 3rd toe amputation\par 10/29/21- RLE angio and AT/PT stent.\par 11/23-11/29- IP stay for infection, new wound between 4/5th toe. \par \par He was discharged with PICC line, on cefazolin and ertapenem for 6 weeks. Washing the wound with dakins solution and applying wet to dry dressings himself, refuses VNS. Wound getting smaller, no longer looks infected. \par

## 2021-12-09 NOTE — PHYSICAL EXAM
[Respiratory Effort] : normal respiratory effort [Normal Heart Sounds] : normal heart sounds [de-identified] : well appearing [FreeTextEntry1] : Right second toe amputation site healing well, smaller, no signs of infection, clean and dry. 4x2cm. Some callus on plantar foot. Wound between 4/5 toe is dry, healing well, no infection.

## 2021-12-09 NOTE — ASSESSMENT
[FreeTextEntry1] : 45 y/o M pt with PMHx of DM, CAD, HTN, HLD.\par 9/28/21- Stepped on a nail\par 10/28/21- 3rd toe amputation\par 10/29/21- RLE angio and AT/PT stent.\par 11/23-11/29- IP stay for infection, new wound between 4/5th toe. \par He was discharged with PICC line, on cefazolin and ertapenem for 6 weeks. Washing the wound with dakins solution and applying wet to dry dressings himself, refuses VNS. Wound getting smaller, no longer looks infected. \par \par Plan:\par - Continue IV abx\par - Was wound with peroxide and wet to dry dressing over the wound and wrap with Kerlix. \par - FU in one week.

## 2021-12-14 ENCOUNTER — APPOINTMENT (OUTPATIENT)
Dept: VASCULAR SURGERY | Facility: CLINIC | Age: 46
End: 2021-12-14
Payer: COMMERCIAL

## 2021-12-14 ENCOUNTER — APPOINTMENT (OUTPATIENT)
Dept: INFECTIOUS DISEASE | Facility: CLINIC | Age: 46
End: 2021-12-14
Payer: COMMERCIAL

## 2021-12-14 ENCOUNTER — APPOINTMENT (OUTPATIENT)
Dept: ENDOCRINOLOGY | Facility: CLINIC | Age: 46
End: 2021-12-14
Payer: COMMERCIAL

## 2021-12-14 VITALS
HEIGHT: 75 IN | BODY MASS INDEX: 31.85 KG/M2 | HEART RATE: 77 BPM | TEMPERATURE: 97.8 F | DIASTOLIC BLOOD PRESSURE: 84 MMHG | WEIGHT: 256.13 LBS | OXYGEN SATURATION: 99 % | SYSTOLIC BLOOD PRESSURE: 132 MMHG

## 2021-12-14 VITALS
HEIGHT: 75 IN | HEART RATE: 81 BPM | TEMPERATURE: 97.1 F | BODY MASS INDEX: 32.01 KG/M2 | DIASTOLIC BLOOD PRESSURE: 85 MMHG | SYSTOLIC BLOOD PRESSURE: 133 MMHG

## 2021-12-14 PROCEDURE — 99214 OFFICE O/P EST MOD 30 MIN: CPT

## 2021-12-14 PROCEDURE — 99024 POSTOP FOLLOW-UP VISIT: CPT

## 2021-12-14 RX ORDER — AMOXICILLIN AND CLAVULANATE POTASSIUM 875; 125 MG/1; MG/1
875-125 TABLET, COATED ORAL
Qty: 20 | Refills: 0 | Status: COMPLETED | COMMUNITY
Start: 2021-10-27 | End: 2021-12-14

## 2021-12-14 RX ORDER — FAMOTIDINE 40 MG/1
40 TABLET, FILM COATED ORAL DAILY
Qty: 30 | Refills: 5 | Status: COMPLETED | COMMUNITY
Start: 2021-11-18 | End: 2021-12-14

## 2021-12-14 RX ORDER — ALPRAZOLAM 0.25 MG/1
0.25 TABLET ORAL DAILY
Qty: 15 | Refills: 0 | Status: COMPLETED | COMMUNITY
End: 2021-12-14

## 2021-12-14 RX ORDER — SULFAMETHOXAZOLE AND TRIMETHOPRIM 800; 160 MG/1; MG/1
800-160 TABLET ORAL
Qty: 20 | Refills: 0 | Status: COMPLETED | COMMUNITY
Start: 2021-10-27 | End: 2021-12-14

## 2021-12-14 NOTE — ASSESSMENT
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  MSSA and Prevotella grew from MT head margin, c/f residual OM. Superficial wound culture grew Klebsiella pneumoniae, GBS, and MSSA.  Mild residual rita-incisional erythema but no warmth or drainage.\par Plan:\par - Continue ertapenem 1 g IV q 24 h and cefazolin 2 g IV q 8 h x 6 weeks (11/26 – 1/6)\par - Weekly CBC, CMP, ESR, CRP while on antibiotics\par - F/u with Dr. Cary as planned\par - Continue local wound care\par Follow up in 2 weeks.

## 2021-12-14 NOTE — REVIEW OF SYSTEMS
[As Noted in HPI] : as noted in HPI [Fever] : no fever [Chills] : no chills [Eye Pain] : no eye pain [Eyesight Problems] : no eyesight problems [Nasal Discharge] : no nasal discharge [Sore Throat] : no sore throat [Chest Pain] : no chest pain [Shortness Of Breath] : no shortness of breath [Cough] : no cough [Abdominal Pain] : no abdominal pain [Vomiting] : no vomiting [Constipation] : no constipation [Dysuria] : no dysuria [Skin Lesions] : no skin lesions [Confused] : no confusion

## 2021-12-14 NOTE — ASSESSMENT
[FreeTextEntry1] : Patient is a 45 yo man with uncontrolled type 2 diabetes and HLD here for diabetes follow up\par \par 1. Uncontrolled T2DM\par -recent toe amputation Right #3, complications include stents and retinopathy. He now has a PICC and required admissions since the last visit\par -discussed the risks of micro/macrovascular events including, but not limited to, heart attack/stroke/eye complications/kidney disease at length\par -importance of glycemic control discussed; goal A1c of 7-7.5%\par -diet remains relatively healthy \par -importance of self-monitoring of blood glucose also discussed. Goal fasting glucose 100-130 with 2 hour post-prandial goals < 180\par -dilated eye exam up to date\par -continued care with vascular\par -he reports high AM glucose values. As such, increase Lantus to 48 and continue humalog 8 with breakfast/lunch, and 12 units with dinner as that is the biggest meal of the day\par -serum A1c in November was 8.9% which is improved from > 10% but not yet at goal. Add GLP-1 agonist.  Start trulicity\par -denies any nausea/vomiting/diarrhea\par -denies personal/family history of pancreatitis/MEN/MTC\par -Rx for Free Style Leticia sent to pharmacy\par -Ideally, the patient would benefit from Jardiance. However, in the setting of recent/acute amputation hold off on SGL2 class of medicines as some studies have shown increased risks of toe/foot amputation. We can consider this medicine in the future\par -TG improved and he was not fasting at the time of lab draw in November\par \par 3. HLD\par -LDL goal < 70\par -statin\par \par Follow up in January 2022.  Call with hypo/hyperglycemic excursions\par \par  [Diabetes Foot Care] : diabetes foot care [Long Term Vascular Complications] : long term vascular complications of diabetes [Carbohydrate Consistent Diet] : carbohydrate consistent diet [Importance of Diet and Exercise] : importance of diet and exercise to improve glycemic control, achieve weight loss and improve cardiovascular health [Exercise/Effect on Glucose] : exercise/effect on glucose [Hypoglycemia Management] : hypoglycemia management [Action and use of Insulin] : action and use of short and long-acting insulin [Self Monitoring of Blood Glucose] : self monitoring of blood glucose [Insulin Self-Administration] : insulin self-administration [Injection Technique, Storage, Sharps Disposal] : injection technique, storage, and sharps disposal [Retinopathy Screening] : Patient was referred to ophthalmology for retinopathy screening

## 2021-12-14 NOTE — PHYSICAL EXAM
[Alert] : alert [Well Nourished] : well nourished [No Acute Distress] : no acute distress [EOMI] : extra ocular movement intact [Normal Hearing] : hearing was normal [No Respiratory Distress] : no respiratory distress [No Accessory Muscle Use] : no accessory muscle use [Clear to Auscultation] : lungs were clear to auscultation bilaterally [Normal S1, S2] : normal S1 and S2 [Normal Rate] : heart rate was normal [Normal Bowel Sounds] : normal bowel sounds [Not Tender] : non-tender [Soft] : abdomen soft [No Stigmata of Cushings Syndrome] : no stigmata of Cushings Syndrome [Foot Ulcers] : foot ulcers present [No Motor Deficits] : the motor exam was normal [Normal Affect] : the affect was normal [Normal Insight/Judgement] : insight and judgment were intact [Normal Mood] : the mood was normal [de-identified] : hard boot; right PICC

## 2021-12-14 NOTE — HISTORY OF PRESENT ILLNESS
[FreeTextEntry1] : Patient is a 47 yo man with uncontrolled T2DM, recent amputation of 3rd toe, HTN and HLD here for diabetes follow up\par \par Diabetes diagnosed at age 24 years. Was on insulin at some point in the past but stopped after duration of 3 years due to reported GI side effects. Had been on ozempic as well but caused GI problems. Used to see Dr. Edel Jo but she moved to [place]-who thought it was a tumor. He had testing and was told it wasn't release of cortisol. Denies any polyuria, no polydipsia. Drinks 6-7 bottles of water not because of thirst.\par Most recent medicines were metformin 1000 mg BID and glipizide 10 mg. \par He was hospitalized in September 2021 for amputation after stepping on a nail. During that hospitalization, he was started on basal/bolus insulin.\par Adheres to humalog 8-8-14 TID with meals and Lantus 44 at bedtime\par AM: 150-180; occasional 210\par Pre-lunch: 105-110\par No hypoglycemia\par Breakfast: sometimes skips, usually boiled egg\par Larger meal 7-9 PM: dinner last night was piece of steak and salad\par Cut out pasta and rice\par Snack: piece of cake for daughter birthday\par No fast food\par Diet Coke\par Dilated eye exam: retinopathy mild\par Cigarettes on and off for 25 years, stopped 3 months ago\par Has had multiple admissions for foot infection, he now has a PICC line\par FreeStyle Leticia was good but the few devices did not work and was cost limiting.  His most recent device broke so he stopped using it

## 2021-12-14 NOTE — HISTORY OF PRESENT ILLNESS
[FreeTextEntry1] : 46 year old male with h/o uncontrolled DM c/b 3rd toe gangrene s/p 3rd toe amputation on 9/29/21 c/b recurrent infection s/p 3rd MT head amputation on 10/28/21 with RLE angio w/ AT and PT stents on 10/29/21. He was admitted to [facility] 11/23 with increased drainage from the surgical site and erythema involving R 4th toe. \par Patient is a  and accidentally stepped onto a rusted nail at early September.  His 3rd R toe developed gangrene and was admitted from 9/28-10/2 for R toe gangrene/DFI.  Underwent R 3rd toe amputation on 9/29, treated with vanc/zosy.  Abscess culture grew Achromobacter and GBS.  Vascular discharged him with amox/bactrim x 2 weeks to treat residual SSTI.  He was readmitted from 10/27-11/1 for increased drainage and swelling of the amputation site and underwent MT head amputation.  OR culture from 3rd MT grew MSSA and prevotella.  He was discharged with amox/Bactrim/metronidazole x 2 weeks course to treat residual infection.  He was getting wound vac and the toes were not  by dressing, resulting blisters between each toe.  He was seen by Dr. Cary on 11/23, he was found to have increased drainage, foul smelling and now with 4th toe erythema. He was sent to ED for IV abx management.  He was stated on vanc/pip-tazo. Superficial culture from 11/23 grew GBS, Klebsiella and Corynebacterium. He is being treated with ertapenem 1 g IV q 24 h and cefazolin 2 g IV q 8 h x 6 weeks (11/26 – 1/6).  He is doing well on antibiotics. He is performing wet to dry dressings. He has f/u with Dr. Cary today.

## 2021-12-14 NOTE — REVIEW OF SYSTEMS
[Fatigue] : no fatigue [Decreased Appetite] : appetite not decreased [Dysphagia] : no dysphagia [Dysphonia] : no dysphonia [Chest Pain] : no chest pain [Slow Heart Rate] : heart rate is not slow [Palpitations] : no palpitations [Fast Heart Rate] : heart rate is not fast [Shortness Of Breath] : no shortness of breath [Cough] : no cough [Nausea] : no nausea [Constipation] : no constipation [Vomiting] : no vomiting [Diarrhea] : no diarrhea

## 2021-12-14 NOTE — PHYSICAL EXAM
[General Appearance - Alert] : alert [General Appearance - Well-Appearing] : healthy appearing [Sclera] : the sclera and conjunctiva were normal [Outer Ear] : the ears and nose were normal in appearance [Examination Of The Oral Cavity] : the lips and gums were normal [Oropharynx] : the oropharynx was normal with no thrush [Auscultation Breath Sounds / Voice Sounds] : lungs were clear to auscultation bilaterally [Heart Rate And Rhythm] : heart rate was normal and rhythm regular [Heart Sounds] : normal S1 and S2 [Murmurs] : no murmurs [Edema] : there was no peripheral edema [Bowel Sounds] : normal bowel sounds [Abdomen Soft] : soft [Abdomen Tenderness] : non-tender [Costovertebral Angle Tenderness] : no CVA tenderness [Skin Color & Pigmentation] : normal skin color and pigmentation [] : no rash [FreeTextEntry1] : s/p R 3rd toe amputation.  ~3 cm linear incision with granular base medially, fibrinous toward edges, small amount of serosanguinous drainage, mild surrounding erythema, no tenderness or warmth.  RUE PICC exit site with some heme.

## 2021-12-16 NOTE — HISTORY OF PRESENT ILLNESS
[FreeTextEntry1] : 47 y/o M pt with PMHx of DM, CAD, HTN, HLD.\par \par 9/28/21- Stepped on a nail\par 10/28/21- 3rd toe amputation\par 10/29/21- RLE angio and AT/PT stent.\par 11/23-11/29- IP stay for infection, new wound between 4/5th toe. \par \par He was discharged with PICC line, on cefazolin and ertapenem for 6 weeks. Doing wet to dry himself, no longer getting VNS. Foot slightly more swollen today

## 2021-12-16 NOTE — PHYSICAL EXAM
[Respiratory Effort] : normal respiratory effort [Normal Heart Sounds] : normal heart sounds [de-identified] : well appearing [FreeTextEntry1] : Right second toe amputation site healing well, smaller, no signs of infection, clean and dry, minimal build up of fibrin in wound. Wound between 4/5 toe is dry, healing well, no infection.

## 2021-12-16 NOTE — ASSESSMENT
[FreeTextEntry1] : 47 y/o M pt with PMHx of DM, CAD, HTN, HLD.\par 9/28/21- Stepped on a nail\par 10/28/21- 3rd toe amputation\par 10/29/21- RLE angio and AT/PT stent.\par 11/23-11/29- IP stay for infection, new wound between 4/5th toe. \par He was discharged with PICC line, on cefazolin and ertapenem for 6 weeks. Washing the wound with dakins solution and applying wet to dry dressings himself, refuses VNS. Wound getting smaller, no longer looks infected. \par \par Plan:\par - Wound was debrided in the office today, due to build up will see him every week for wound check and debridement. Try to wear ACE bandage and elevate the leg for the edema. Will call us if it gets worse.

## 2021-12-16 NOTE — END OF VISIT
[FreeTextEntry3] : I, Dr. Thomas Cary  have read and attest that all the information, medical decision making and discharge instructions within are true and accurate. I personally performed the evaluation and management (E/M) services for this established patient who presents today with (a) new problem(s)/exacerbation of (an) existing condition(s).  That E/M includes conducting the examination, assessing all new/exacerbated conditions, and establishing a new plan of care.  Today, my ACP, Yennifer Nassar PA-C, was here to observe my evaluation and management services for this new problem/exacerbated condition to be followed going forward.\par   [Time Spent: ___ minutes] : I have spent [unfilled] minutes of time on the encounter.

## 2021-12-20 ENCOUNTER — APPOINTMENT (OUTPATIENT)
Dept: VASCULAR SURGERY | Facility: CLINIC | Age: 46
End: 2021-12-20
Payer: COMMERCIAL

## 2021-12-20 PROCEDURE — 99024 POSTOP FOLLOW-UP VISIT: CPT

## 2021-12-21 ENCOUNTER — APPOINTMENT (OUTPATIENT)
Dept: INFECTIOUS DISEASE | Facility: CLINIC | Age: 46
End: 2021-12-21

## 2021-12-28 ENCOUNTER — APPOINTMENT (OUTPATIENT)
Dept: VASCULAR SURGERY | Facility: CLINIC | Age: 46
End: 2021-12-28
Payer: COMMERCIAL

## 2021-12-28 PROCEDURE — 99024 POSTOP FOLLOW-UP VISIT: CPT

## 2021-12-28 NOTE — HISTORY OF PRESENT ILLNESS
[FreeTextEntry1] : 46yoM w/PMHx of DM, CAD, HTN, HLD, initially presented to office after stepping on a nail requiring R 3rd toe amp.  Amp site poorly perfused and pt was taken for RLE angio w/AT/PT stent, and started on IV abx for R foot infection, now w/PICC line for home therapy w/cefazolin and ertapenem; pt has 10d IV therapy left.  He is currently dressing his open wound at home w/normal saline moist-->dry qd.

## 2021-12-28 NOTE — ASSESSMENT
[FreeTextEntry1] : 46yoM w/PMHx of DM, CAD, HTN, HLD, initially presented to office after stepping on a nail requiring R 3rd toe amp.  Amp site poorly perfused and pt was taken for RLE angio w/AT/PT stent, and started on IV abx for R foot infection, now w/PICC line for home therapy w/cefazolin and ertapenem; pt has 10d IV therapy left.  He is currently dressing his open wound at home w/normal saline moist-->dry qd.\par \par Wound nearly closed, will need further dressing changes qd; pt will continue NS moist-->dry and RTO in 1wk to f/u w/Dr. Cary.

## 2021-12-28 NOTE — PHYSICAL EXAM
[JVD] : no jugular venous distention  [Normal Thyroid] : the thyroid was normal [Respiratory Effort] : normal respiratory effort [Normal Heart Sounds] : normal heart sounds [Ankle Swelling (On Exam)] : present [Ankle Swelling On The Right] : mild [Varicose Veins Of Lower Extremities] : not present [] : not present [Purpura] : no purpura  [Petechiae] : no petechiae [Skin Ulcer] : ulcer [Skin Induration] : no induration [Alert] : alert [Calm] : calm [de-identified] : well appearing [de-identified] : NC/AT, anicteric [FreeTextEntry1] : R second toe amputation site healing well, smaller, no signs of infection, clean and dry, minimal build up of fibrin in wound.  No other wounds visible. [de-identified] : FROM throughout, strength 5/5x4, no palpable cords in LEs

## 2021-12-30 NOTE — END OF VISIT
[FreeTextEntry3] : I, Dr. Thomas Cary  have read and attest that all the information, medical decision making and discharge instructions within are true and accurate. I personally performed the evaluation and management (E/M) services for this established patient who presents today with (a) chronic problem(s) of (an) existing condition(s).  That E/M includes conducting the examination, assessing all conditions, and establishing a plan of care. Today, my ACP, Yennifer Nassar PA-C, was here to observe my evaluation and management services for this condition(s) to be followed going forward.  [Time Spent: ___ minutes] : I have spent [unfilled] minutes of time on the encounter.

## 2021-12-30 NOTE — ASSESSMENT
[FreeTextEntry1] : 45 y/o M pt with PMHx of DM, CAD, HTN, HLD.\par 9/28/21- Stepped on a nail\par 10/28/21- 3rd toe amputation\par 10/29/21- RLE angio and AT/PT stent.\par 11/23-11/29- IP stay for infection, new wound between 4/5th toe. \par He was discharged with PICC line, on cefazolin and ertapenem for 6 weeks. \par \par Plan:\par - Continue weekly debridement and wet to dry at home

## 2021-12-30 NOTE — HISTORY OF PRESENT ILLNESS
[FreeTextEntry1] : 45 y/o M pt with PMHx of DM, CAD, HTN, HLD.\par \par 9/28/21- Stepped on a nail\par 10/28/21- 3rd toe amputation\par 10/29/21- RLE angio and AT/PT stent.\par 11/23-11/29- Inpatient stay for infection, new wound between 4/5th toe. \par \par He was discharged with PICC line, on cefazolin and ertapenem for 6 weeks (Jan 6 end date). Doing wet to dry himself, no longer getting VNS.

## 2021-12-30 NOTE — PHYSICAL EXAM
[Respiratory Effort] : normal respiratory effort [Normal Heart Sounds] : normal heart sounds [de-identified] : well appearing [FreeTextEntry1] : Right second toe amputation site healing well, smaller, no signs of infection, clean and dry, minimal build up of fibrin in wound. Wound between 4/5 toe is dry, healing well, no infection.

## 2022-01-04 ENCOUNTER — APPOINTMENT (OUTPATIENT)
Dept: INFECTIOUS DISEASE | Facility: CLINIC | Age: 47
End: 2022-01-04
Payer: COMMERCIAL

## 2022-01-04 DIAGNOSIS — U07.1 COVID-19: ICD-10-CM

## 2022-01-04 PROCEDURE — 99213 OFFICE O/P EST LOW 20 MIN: CPT | Mod: 95

## 2022-01-04 PROCEDURE — 99203 OFFICE O/P NEW LOW 30 MIN: CPT | Mod: 95

## 2022-01-04 NOTE — ASSESSMENT
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  MSSA and Prevotella grew from MT head margin, c/f residual OM. Superficial wound culture grew Klebsiella pneumoniae, GBS, and MSSA.  Wound continues to heal. \par Plan:\par - Continue ertapenem 1 g IV q 24 h and cefazolin 2 g IV q 8 h x 6 weeks (11/26/21 – 1/6/22)\par - D/c PICC after final dose\par - Weekly CBC, CMP, ESR, CRP while on antibiotics\par - F/u with Dr. Cary as planned\par - Continue local wound care\par - Encouraged to get COVID booster after quarantine period\par Follow up 2-3 weeks after completing antibiotics.

## 2022-01-04 NOTE — REVIEW OF SYSTEMS
[As Noted in HPI] : as noted in HPI [Eye Pain] : no eye pain [Eyesight Problems] : no eyesight problems [Chest Pain] : no chest pain [Shortness Of Breath] : no shortness of breath [Cough] : no cough [Abdominal Pain] : no abdominal pain [Vomiting] : no vomiting [Dysuria] : no dysuria [Skin Lesions] : no skin lesions

## 2022-01-04 NOTE — HISTORY OF PRESENT ILLNESS
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 He was readmitted from 10/27-11/1/21 for increased drainage and swelling of the amputation site and underwent MT head amputation.  MSSA and Prevotella grew from MT head margin, c/f residual OM. Superficial wound culture grew Klebsiella pneumoniae, GBS, and MSSA. He is being treated with ertapenem 1 g IV q 24 h and cefazolin 2 g IV q 8 h x 6 weeks (11/26/21 – 1/6/22).  He is having wound debrided weekly by Dr. Cary.  Wound is healing. He denies pain in foot. He recently tested positive for COVID on a home test. He started having symptoms on 12/29. Symptoms started with runny nose then fever, sore throat, myalgias and headache. He felt the worst on 12/31/21 and 1/1/22. He has started to feel better over the past 2 days. He received Moderna vaccine, 2nd dose in March 2021. He has not received booster yet.

## 2022-01-04 NOTE — REASON FOR VISIT
[Follow-Up: _____] : a [unfilled] follow-up visit [Home] : at home, [unfilled] , at the time of the visit. [Medical Office: ([facility])___] : at the medical office located in  [Other:____] : [unfilled] [Verbal consent obtained from patient] : the patient, [unfilled]

## 2022-01-04 NOTE — PHYSICAL EXAM
[General Appearance - Alert] : alert [General Appearance - In No Acute Distress] : in no acute distress [] : no respiratory distress [FreeTextEntry1] : RUE PICC exit site without erythema;  R foot s/p 3rd digit amputation, linear wound healing, mild erythema, no exudate

## 2022-01-11 ENCOUNTER — APPOINTMENT (OUTPATIENT)
Dept: VASCULAR SURGERY | Facility: CLINIC | Age: 47
End: 2022-01-11
Payer: COMMERCIAL

## 2022-01-11 DIAGNOSIS — L08.9 OTHER INJURY OF UNSPECIFIED BODY REGION, INITIAL ENCOUNTER: ICD-10-CM

## 2022-01-11 DIAGNOSIS — T14.8XXA OTHER INJURY OF UNSPECIFIED BODY REGION, INITIAL ENCOUNTER: ICD-10-CM

## 2022-01-11 PROCEDURE — 99024 POSTOP FOLLOW-UP VISIT: CPT

## 2022-01-12 NOTE — PHYSICAL EXAM
[Normal Thyroid] : the thyroid was normal [Respiratory Effort] : normal respiratory effort [Normal Heart Sounds] : normal heart sounds [Ankle Swelling (On Exam)] : present [Ankle Swelling On The Right] : mild [Skin Ulcer] : ulcer [Alert] : alert [Calm] : calm [JVD] : no jugular venous distention  [Varicose Veins Of Lower Extremities] : not present [] : not present [Purpura] : no purpura  [Petechiae] : no petechiae [Skin Induration] : no induration [de-identified] : well appearing [de-identified] : NC/AT, anicteric [FreeTextEntry1] : R second toe amputation site healing well, smaller, no signs of infection, clean and dry, minimal build up of fibrin in wound.  No other wounds visible. [de-identified] : FROM throughout, strength 5/5x4, no palpable cords in LEs

## 2022-01-12 NOTE — HISTORY OF PRESENT ILLNESS
[FreeTextEntry1] : 47 yo M w/PMHx of DM, CAD, HTN, HLD, initially presented to office after stepping on a nail requiring R 3rd toe amp.  Amp site poorly perfused and pt was taken for RLE angio 10/29/21 w/AT/PT stent, and started on IV abx for R foot infection, finished 1/6/22, PICC line removed. \par \par He is currently dressing his open wound at home w/normal saline moist-->dry qd. almost healed.

## 2022-01-12 NOTE — ASSESSMENT
[FreeTextEntry1] : 46yoM w/PMHx of DM, CAD, HTN, HLD, initially presented to office after stepping on a nail requiring R 3rd toe amp.  Amp site poorly perfused and pt was taken for RLE angio w/AT/PT stent, and started on IV abx for R foot infection, finished 1/6/22, PICC line removed. \par \par Wound almost completely healed. Will see him back in 2 weeks to make sure wound is completely healed and will have him return in May for stent FU with US.

## 2022-01-14 ENCOUNTER — APPOINTMENT (OUTPATIENT)
Dept: INFECTIOUS DISEASE | Facility: CLINIC | Age: 47
End: 2022-01-14
Payer: COMMERCIAL

## 2022-01-14 VITALS
DIASTOLIC BLOOD PRESSURE: 85 MMHG | TEMPERATURE: 97.3 F | OXYGEN SATURATION: 99 % | SYSTOLIC BLOOD PRESSURE: 138 MMHG | WEIGHT: 258 LBS | HEIGHT: 75 IN | BODY MASS INDEX: 32.08 KG/M2 | HEART RATE: 81 BPM

## 2022-01-14 DIAGNOSIS — M86.9 OSTEOMYELITIS, UNSPECIFIED: ICD-10-CM

## 2022-01-14 PROCEDURE — 99214 OFFICE O/P EST MOD 30 MIN: CPT | Mod: 25

## 2022-01-14 PROCEDURE — 36415 COLL VENOUS BLD VENIPUNCTURE: CPT

## 2022-01-14 NOTE — PHYSICAL EXAM
[General Appearance - Alert] : alert [General Appearance - In No Acute Distress] : in no acute distress [Sclera] : the sclera and conjunctiva were normal [Outer Ear] : the ears and nose were normal in appearance [Examination Of The Oral Cavity] : the lips and gums were normal [Oropharynx] : the oropharynx was normal with no thrush [Heart Rate And Rhythm] : heart rate was normal and rhythm regular [Heart Sounds] : normal S1 and S2 [Murmurs] : no murmurs [Edema] : there was no peripheral edema [Bowel Sounds] : normal bowel sounds [Abdomen Soft] : soft [Abdomen Tenderness] : non-tender [Costovertebral Angle Tenderness] : no CVA tenderness [Skin Color & Pigmentation] : normal skin color and pigmentation [] : no rash [FreeTextEntry1] : RUE former PICC exit site healed;  R foot s/p 3rd digit amputation, linear wound with small amount of eschar, mild warmth of foot, no drainage.  ~0.5 cm ulcer lateral 4th toe with clean base.

## 2022-01-14 NOTE — HISTORY OF PRESENT ILLNESS
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 He was readmitted from 10/27-11/1/21 for increased drainage and swelling of the amputation site and underwent MT head amputation. MSSA and Prevotella grew from MT head margin, c/f residual OM. Superficial wound culture grew Klebsiella pneumoniae, GBS, and MSSA. He was treated with ertapenem 1 g IV q 24 h and cefazolin 2 g IV q 8 h x 6 weeks (11/26/21 – 1/6/22). He is having wound debrided weekly by Dr. Cary. He presents today for evaluation off antibiotics. He has irritation between 4th and 5th toes which started when he had wound vac.  He has f/u with podiatrist.

## 2022-01-14 NOTE — REVIEW OF SYSTEMS
[As Noted in HPI] : as noted in HPI [Fever] : no fever [Chills] : no chills [Eye Pain] : no eye pain [Eyesight Problems] : no eyesight problems [Nasal Discharge] : no nasal discharge [Sore Throat] : no sore throat [Chest Pain] : no chest pain [Palpitations] : no palpitations [Shortness Of Breath] : no shortness of breath [Cough] : no cough [Dysuria] : no dysuria [Skin Lesions] : no skin lesions

## 2022-01-14 NOTE — ASSESSMENT
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  MSSA and Prevotella grew from MT head margin, c/f residual OM. Superficial wound culture grew Klebsiella pneumoniae, GBS, and MSSA.  Wound is significantly improved. \par Plan:\par - CBC, CMP, ESR, CRP drawn and sent from office\par - F/u with Dr. Cary as planned\par - Continue local wound care\par - F/u with podiatry for ulcer between R 4th and 5th toe \par - Encouraged to get COVID booster\par Follow up PRN. Will contact patient with results.

## 2022-01-18 LAB
ALBUMIN SERPL ELPH-MCNC: 5 G/DL
ALP BLD-CCNC: 89 U/L
ALT SERPL-CCNC: 51 U/L
ANION GAP SERPL CALC-SCNC: 14 MMOL/L
AST SERPL-CCNC: 29 U/L
BASOPHILS # BLD AUTO: 0.05 K/UL
BASOPHILS NFR BLD AUTO: 0.7 %
BILIRUB SERPL-MCNC: 0.6 MG/DL
BUN SERPL-MCNC: 16 MG/DL
CALCIUM SERPL-MCNC: 10.1 MG/DL
CHLORIDE SERPL-SCNC: 99 MMOL/L
CO2 SERPL-SCNC: 27 MMOL/L
CREAT SERPL-MCNC: 0.67 MG/DL
CRP SERPL-MCNC: 5 MG/L
EOSINOPHIL # BLD AUTO: 0.21 K/UL
EOSINOPHIL NFR BLD AUTO: 2.9 %
ERYTHROCYTE [SEDIMENTATION RATE] IN BLOOD BY WESTERGREN METHOD: 20 MM/HR
GLUCOSE SERPL-MCNC: 157 MG/DL
HCT VFR BLD CALC: 45.7 %
HGB BLD-MCNC: 14.1 G/DL
IMM GRANULOCYTES NFR BLD AUTO: 0.5 %
LYMPHOCYTES # BLD AUTO: 1.9 K/UL
LYMPHOCYTES NFR BLD AUTO: 26 %
MAN DIFF?: NORMAL
MCHC RBC-ENTMCNC: 28.6 PG
MCHC RBC-ENTMCNC: 30.9 GM/DL
MCV RBC AUTO: 92.7 FL
MONOCYTES # BLD AUTO: 0.53 K/UL
MONOCYTES NFR BLD AUTO: 7.3 %
NEUTROPHILS # BLD AUTO: 4.57 K/UL
NEUTROPHILS NFR BLD AUTO: 62.6 %
PLATELET # BLD AUTO: 287 K/UL
POTASSIUM SERPL-SCNC: 4.9 MMOL/L
PROT SERPL-MCNC: 8 G/DL
RBC # BLD: 4.93 M/UL
RBC # FLD: 13 %
SODIUM SERPL-SCNC: 139 MMOL/L
WBC # FLD AUTO: 7.3 K/UL

## 2022-01-25 ENCOUNTER — NON-APPOINTMENT (OUTPATIENT)
Age: 47
End: 2022-01-25

## 2022-01-25 ENCOUNTER — APPOINTMENT (OUTPATIENT)
Dept: ENDOCRINOLOGY | Facility: CLINIC | Age: 47
End: 2022-01-25
Payer: COMMERCIAL

## 2022-01-25 ENCOUNTER — APPOINTMENT (OUTPATIENT)
Dept: VASCULAR SURGERY | Facility: CLINIC | Age: 47
End: 2022-01-25
Payer: COMMERCIAL

## 2022-01-25 VITALS
TEMPERATURE: 97.5 F | OXYGEN SATURATION: 98 % | HEART RATE: 92 BPM | SYSTOLIC BLOOD PRESSURE: 133 MMHG | WEIGHT: 256 LBS | DIASTOLIC BLOOD PRESSURE: 88 MMHG | HEIGHT: 75 IN | BODY MASS INDEX: 31.83 KG/M2

## 2022-01-25 LAB — GLUCOSE BLDC GLUCOMTR-MCNC: 116

## 2022-01-25 PROCEDURE — 99214 OFFICE O/P EST MOD 30 MIN: CPT

## 2022-01-25 PROCEDURE — 99024 POSTOP FOLLOW-UP VISIT: CPT

## 2022-01-25 NOTE — PHYSICAL EXAM
[Alert] : alert [Well Nourished] : well nourished [No Acute Distress] : no acute distress [EOMI] : extra ocular movement intact [Normal Hearing] : hearing was normal [No Respiratory Distress] : no respiratory distress [No Accessory Muscle Use] : no accessory muscle use [Clear to Auscultation] : lungs were clear to auscultation bilaterally [Normal S1, S2] : normal S1 and S2 [Normal Rate] : heart rate was normal [Normal Bowel Sounds] : normal bowel sounds [Not Tender] : non-tender [Soft] : abdomen soft [No Stigmata of Cushings Syndrome] : no stigmata of Cushings Syndrome [Foot Ulcers] : foot ulcers present [No Motor Deficits] : the motor exam was normal [Normal Affect] : the affect was normal [Normal Insight/Judgement] : insight and judgment were intact [Normal Mood] : the mood was normal [Normal Gait] : normal gait [de-identified] : right amputation #3, there is a small wound; goot is warm to touch and ertthematous

## 2022-01-25 NOTE — HISTORY OF PRESENT ILLNESS
[FreeTextEntry1] : Patient is a 47 yo man with uncontrolled T2DM, recent amputation of 3rd toe, HTN and HLD here for diabetes follow up\par \par Diabetes diagnosed at age 24 years. Was on insulin at some point in the past but stopped after duration of 3 years due to reported GI side effects. Had been on ozempic as well but caused GI problems. Used to see Dr. Edel Jo but she moved to [place]-who thought it was a tumor. He had testing and was told it wasn't release of cortisol. Denies any polyuria, no polydipsia. Drinks 6-7 bottles of water not because of thirst.\par Most recent medicines were metformin 1000 mg BID and glipizide 10 mg. \par He was hospitalized in September 2021 for amputation after stepping on a nail. During that hospitalization, he was started on basal/bolus insulin.\par Adheres to humalog 8-8-14 TID with meals, metformin 1000 mg BID and Lantus 44 at bedtime. Trulicity was started at the last visit. Patient had a trial of jardiance in the past but it "didn't agree"\par AM: 160-200s\par Pre-lunch: 105-110\par No hypoglycemia\par Breakfast: sometimes skips, usually boiled egg\par Larger meal 7-9 PM: dinner last night was piece of steak and salad\par Cut out pasta and rice\par No fast food\par Diet Coke\par Dilated eye exam: retinopathy mild\par Cigarettes on and off for 25 years, stopped 3 months ago\par Has had multiple admissions for foot infection, he now has a PICC line\par FreeStyle Leticia was good but the few devices did not work and was cost limiting. His most recent device broke so he stopped using it \par He has taken about 3 doses of trulicity but it caused some nausea

## 2022-01-25 NOTE — REVIEW OF SYSTEMS
[Fatigue] : no fatigue [Decreased Appetite] : appetite not decreased [Dysphagia] : no dysphagia [Dysphonia] : no dysphonia [Chest Pain] : no chest pain [Palpitations] : no palpitations [Shortness Of Breath] : no shortness of breath [Nausea] : no nausea [Constipation] : no constipation [Vomiting] : no vomiting [Diarrhea] : no diarrhea [Headaches] : no headaches [Tremors] : no tremors [Depression] : no depression [Cold Intolerance] : no cold intolerance

## 2022-01-25 NOTE — ASSESSMENT
[FreeTextEntry1] : Patient is a 45 yo man with uncontrolled type 2 diabetes and HLD here for diabetes follow up\par \par 1. Uncontrolled T2DM\par -recent toe amputation Right #3, complications include stents and retinopathy. He now has a PICC and is completing course of antibiotics\par -his wound is clean, there is no purulent drainage but feels warm to touch with some areas of erythema.  Patient gets consistent care with podiatry\par -discussed the risks of micro/macrovascular events including, but not limited to, heart attack/stroke/eye complications/kidney disease at length\par -importance of glycemic control discussed; goal A1c of 7-7.5%\par -diet remains relatively healthy \par -importance of self-monitoring of blood glucose also discussed. Goal fasting glucose 100-130 with 2 hour post-prandial goals < 180\par -dilated eye exam up to date\par -continued care with vascular\par -he reports high AM glucose values. As such, increase Lantus to 48 from 44 units and continue humalog 8 with breakfast/lunch, and 12 units with dinner as that is the biggest meal of the day\par -serum A1c in November was 8.9% which is improved from > 10% but not yet at goal\par -repeat serum A1c in February\par -Trulicity was started but he is getting nausea and felt the symptoms were not ideal. Can do a trial off of trulicity\par -denies personal/family history of pancreatitis/MEN/MTC\par -Ideally, the patient would benefit from Jardiance. However, in the setting of recent/acute amputation hold off on SGL2 class of medicines as some studies have shown increased risks of toe/foot amputation. We can consider this medicine in the future\par \par 2. HLD\par -LDL goal < 70\par -statin\par \par Follow up in three months [Diabetes Foot Care] : diabetes foot care [Long Term Vascular Complications] : long term vascular complications of diabetes [Carbohydrate Consistent Diet] : carbohydrate consistent diet [Importance of Diet and Exercise] : importance of diet and exercise to improve glycemic control, achieve weight loss and improve cardiovascular health [Action and use of Insulin] : action and use of short and long-acting insulin [Self Monitoring of Blood Glucose] : self monitoring of blood glucose [Insulin Self-Administration] : insulin self-administration [Injection Technique, Storage, Sharps Disposal] : injection technique, storage, and sharps disposal [Retinopathy Screening] : Patient was referred to ophthalmology for retinopathy screening

## 2022-01-26 RX ORDER — METRONIDAZOLE 500 MG/1
500 TABLET ORAL
Qty: 28 | Refills: 0 | Status: ACTIVE | COMMUNITY
Start: 2021-11-01

## 2022-02-01 NOTE — HISTORY OF PRESENT ILLNESS
[FreeTextEntry1] : 45 yo M w/PMHx of DM, CAD, HTN, HLD, initially presented to office after stepping on a nail requiring R 3rd toe amp.  Amp site poorly perfused and pt was taken for RLE angio 10/29/21 w/AT/PT stent, and started on IV abx for R foot infection, finished 1/6/22, PICC line removed. \par \par He is currently dressing his open wound at home w/normal saline moist-->dry qd. almost healed. \par He saw his podiatrist Dr. Hoffman who is worried about infection, will be seeing him in a few weeks. using a new pad for the wounds that Dr. Hoffman gave him.

## 2022-02-01 NOTE — ASSESSMENT
[FreeTextEntry1] : 46yoM w/PMHx of DM, CAD, HTN, HLD, initially presented to office after stepping on a nail requiring R 3rd toe amp.  Amp site poorly perfused and pt was taken for RLE angio w/AT/PT stent, and started on IV abx for R foot infection, finished 1/6/22, PICC line removed. \par \par Wound almost completely healed. Will see him back in 2 weeks to make sure wound is completely healed and will have him return in May 2022 for stent FU with US.

## 2022-02-01 NOTE — PHYSICAL EXAM
[Normal Thyroid] : the thyroid was normal [Respiratory Effort] : normal respiratory effort [Normal Heart Sounds] : normal heart sounds [Ankle Swelling (On Exam)] : present [Ankle Swelling On The Right] : mild [Skin Ulcer] : ulcer [Alert] : alert [Calm] : calm [JVD] : no jugular venous distention  [Varicose Veins Of Lower Extremities] : not present [] : not present [Purpura] : no purpura  [Petechiae] : no petechiae [Skin Induration] : no induration [de-identified] : well appearing [de-identified] : NC/AT, anicteric [FreeTextEntry1] : R third toe amputation site healing well, almost completely closed, no signs of infection, clean and dry, minimal build up of fibrin in wound. 2nd toe slightly swollen, no erythema or tenderness. Very small superficial clean ulcer on the fourth toe between the 4/5th toe. No sins of infection, getting smaller.  [de-identified] : FROM throughout, strength 5/5x4, no palpable cords in LEs

## 2022-02-15 ENCOUNTER — APPOINTMENT (OUTPATIENT)
Dept: VASCULAR SURGERY | Facility: CLINIC | Age: 47
End: 2022-02-15
Payer: COMMERCIAL

## 2022-02-15 PROCEDURE — 99212 OFFICE O/P EST SF 10 MIN: CPT

## 2022-02-17 NOTE — HISTORY OF PRESENT ILLNESS
[FreeTextEntry1] : 45 yo M w/PMHx of DM, CAD, HTN, HLD, initially presented to office after stepping on a nail requiring R 3rd toe amp.  Amp site poorly perfused and pt was taken for RLE angio 10/29/21 w/AT/PT stent.\par \par He is followed by his podiatrist Dr. Hoffman who discussed surgical excision and closure of open wound on the fourth toe. Currently doing wound care with special pad between the toes.

## 2022-02-17 NOTE — PHYSICAL EXAM
[Normal Thyroid] : the thyroid was normal [Respiratory Effort] : normal respiratory effort [Normal Heart Sounds] : normal heart sounds [Ankle Swelling (On Exam)] : present [Ankle Swelling On The Right] : mild [Skin Ulcer] : ulcer [Alert] : alert [Calm] : calm [JVD] : no jugular venous distention  [Varicose Veins Of Lower Extremities] : not present [] : not present [Purpura] : no purpura  [Petechiae] : no petechiae [Skin Induration] : no induration [de-identified] : well appearing [de-identified] : NC/AT, anicteric [FreeTextEntry1] : R third toe amputation site healed.\par Very small superficial clean ulcer on the fourth toe between the 4/5th toe. No signs of infection, some fibrin.  [de-identified] : FROM throughout, strength 5/5x4, no palpable cords in LEs

## 2022-02-17 NOTE — ASSESSMENT
[FreeTextEntry1] : 46yoM w/PMHx of DM, CAD, HTN, HLD, initially presented to office after stepping on a nail requiring R 3rd toe amp.  Amp site poorly perfused and pt was taken for RLE angio w/AT/PT stent.\par \par Plan:\par -3rd toe amp is healed, monitoring the fourth toe ulcer. The wound was debrided today. He is discussing with podiatry whether he should proceed with surgical wound debridement/closure vs wound care. At this time will continue wound care per patient request which is reasonable, will FU here in 3 weeks, will return sooner with any isues.

## 2022-03-07 ENCOUNTER — APPOINTMENT (OUTPATIENT)
Dept: VASCULAR SURGERY | Facility: CLINIC | Age: 47
End: 2022-03-07
Payer: COMMERCIAL

## 2022-03-07 VITALS
HEIGHT: 75 IN | WEIGHT: 256 LBS | HEART RATE: 89 BPM | DIASTOLIC BLOOD PRESSURE: 89 MMHG | SYSTOLIC BLOOD PRESSURE: 135 MMHG | BODY MASS INDEX: 31.83 KG/M2

## 2022-03-07 PROCEDURE — 99212 OFFICE O/P EST SF 10 MIN: CPT

## 2022-03-11 NOTE — PHYSICAL EXAM
[Normal Thyroid] : the thyroid was normal [Respiratory Effort] : normal respiratory effort [Normal Heart Sounds] : normal heart sounds [Ankle Swelling (On Exam)] : present [Ankle Swelling On The Right] : mild [Skin Ulcer] : ulcer [Alert] : alert [Calm] : calm [JVD] : no jugular venous distention  [Varicose Veins Of Lower Extremities] : not present [] : not present [Purpura] : no purpura  [Petechiae] : no petechiae [Skin Induration] : no induration [de-identified] : well appearing [de-identified] : NC/AT, anicteric [FreeTextEntry1] : R third toe amputation site healed.\par Very small superficial clean ulcer on the fourth toe between the 4/5th toe. No signs of infection, some fibrin.  [de-identified] : FROM throughout, strength 5/5x4, no palpable cords in LEs

## 2022-03-11 NOTE — ADDENDUM
[FreeTextEntry1] : This note was written by Jeovany Plasencia, acting as a scribe for Dr. Thomas Cary.  I, Dr. Thomas Cary, have read and attest that all the information, medical decision-making, and discharge instructions within are true and accurate.\par \par I, Dr. Thomas Cary, personally performed the evaluation and management (E/M) services for this new patient.  That E/M includes conducting the initial examination, assessing all conditions, and establishing the plan of care.  Today, my ACP, Jeovany Plasencia, was here to observe my evaluation and management services for this patient to be followed going forward.

## 2022-03-11 NOTE — ASSESSMENT
[FreeTextEntry1] : 46yoM w/PMHx of DM, CAD, HTN, HLD, initially presented to office after stepping on a nail requiring R 3rd toe amp.  Amp site poorly perfused and pt was taken for RLE angio 10/29/21 w/AT/PT stent.  He is followed by his podiatrist Dr. Hoffman who discussed surgical excision and closure of open wound on the fourth toe. Currently doing wound care with special pad between the toes, but may require further podiatric surgery to heal a punctate 4th toe ulcer.\par \par RLE well-perfused on exam but punctate ulcer noted on the R 4th toe.  RLE circulation optimized at this time, if surgical intervention is required on the toe, pt is cleared from a vascular standpoint.  Will f/u w/pt in office for surveillance duplex in May 2022.

## 2022-03-11 NOTE — HISTORY OF PRESENT ILLNESS
[FreeTextEntry1] : 46yoM w/PMHx of DM, CAD, HTN, HLD, initially presented to office after stepping on a nail requiring R 3rd toe amp.  Amp site poorly perfused and pt was taken for RLE angio 10/29/21 w/AT/PT stent.  He is followed by his podiatrist Dr. Hoffman who discussed surgical excision and closure of open wound on the fourth toe. Currently doing wound care with special pad between the toes, but may require further podiatric surgery to heal a punctate 4th toe ulcer.

## 2022-03-13 ENCOUNTER — RX RENEWAL (OUTPATIENT)
Age: 47
End: 2022-03-13

## 2022-03-14 RX ORDER — CLOPIDOGREL BISULFATE 75 MG/1
75 TABLET, FILM COATED ORAL DAILY
Qty: 90 | Refills: 1 | Status: ACTIVE | COMMUNITY
Start: 2020-09-09 | End: 1900-01-01

## 2022-03-30 DIAGNOSIS — I49.3 VENTRICULAR PREMATURE DEPOLARIZATION: ICD-10-CM

## 2022-04-06 ENCOUNTER — APPOINTMENT (OUTPATIENT)
Dept: HEART AND VASCULAR | Facility: CLINIC | Age: 47
End: 2022-04-06
Payer: COMMERCIAL

## 2022-04-06 VITALS
HEART RATE: 89 BPM | HEIGHT: 75 IN | OXYGEN SATURATION: 97 % | WEIGHT: 256 LBS | SYSTOLIC BLOOD PRESSURE: 140 MMHG | TEMPERATURE: 97.6 F | DIASTOLIC BLOOD PRESSURE: 90 MMHG | BODY MASS INDEX: 31.83 KG/M2

## 2022-04-06 DIAGNOSIS — E11.65 TYPE 2 DIABETES MELLITUS WITH UNSPECIFIED COMPLICATIONS: ICD-10-CM

## 2022-04-06 DIAGNOSIS — G62.9 POLYNEUROPATHY, UNSPECIFIED: ICD-10-CM

## 2022-04-06 DIAGNOSIS — R94.31 ABNORMAL ELECTROCARDIOGRAM [ECG] [EKG]: ICD-10-CM

## 2022-04-06 DIAGNOSIS — E11.8 TYPE 2 DIABETES MELLITUS WITH UNSPECIFIED COMPLICATIONS: ICD-10-CM

## 2022-04-06 DIAGNOSIS — Z01.818 ENCOUNTER FOR OTHER PREPROCEDURAL EXAMINATION: ICD-10-CM

## 2022-04-06 DIAGNOSIS — I73.9 PERIPHERAL VASCULAR DISEASE, UNSPECIFIED: ICD-10-CM

## 2022-04-06 DIAGNOSIS — E66.9 OBESITY, UNSPECIFIED: ICD-10-CM

## 2022-04-06 DIAGNOSIS — I25.10 ATHEROSCLEROTIC HEART DISEASE OF NATIVE CORONARY ARTERY W/OUT ANGINA PECTORIS: ICD-10-CM

## 2022-04-06 PROCEDURE — 99214 OFFICE O/P EST MOD 30 MIN: CPT | Mod: 57

## 2022-04-06 PROCEDURE — 93000 ELECTROCARDIOGRAM COMPLETE: CPT | Mod: NC

## 2022-04-06 PROCEDURE — 36415 COLL VENOUS BLD VENIPUNCTURE: CPT

## 2022-04-07 DIAGNOSIS — E78.1 PURE HYPERGLYCERIDEMIA: ICD-10-CM

## 2022-04-07 PROBLEM — I73.9 PERIPHERAL VASCULAR DISEASE OF LOWER EXTREMITY: Status: ACTIVE | Noted: 2021-11-18

## 2022-04-07 PROBLEM — R94.31 ABNORMAL ECG: Status: ACTIVE | Noted: 2021-11-18

## 2022-04-07 PROBLEM — E66.9 OBESITY: Status: ACTIVE | Noted: 2022-04-07

## 2022-04-07 LAB
ALBUMIN SERPL ELPH-MCNC: 4.8 G/DL
ALP BLD-CCNC: 82 U/L
ALT SERPL-CCNC: 27 U/L
ANION GAP SERPL CALC-SCNC: 15 MMOL/L
APTT BLD: 26.8 SEC
AST SERPL-CCNC: 21 U/L
BASOPHILS # BLD AUTO: 0.04 K/UL
BASOPHILS NFR BLD AUTO: 0.5 %
BILIRUB SERPL-MCNC: 0.8 MG/DL
BUN SERPL-MCNC: 15 MG/DL
CALCIUM SERPL-MCNC: 10.4 MG/DL
CHLORIDE SERPL-SCNC: 94 MMOL/L
CHOLEST SERPL-MCNC: 175 MG/DL
CO2 SERPL-SCNC: 27 MMOL/L
CREAT SERPL-MCNC: 0.67 MG/DL
EGFR: 115 ML/MIN/1.73M2
EOSINOPHIL # BLD AUTO: 0.17 K/UL
EOSINOPHIL NFR BLD AUTO: 2.2 %
ESTIMATED AVERAGE GLUCOSE: 338 MG/DL
GLUCOSE SERPL-MCNC: 343 MG/DL
HBA1C MFR BLD HPLC: 13.4 %
HCT VFR BLD CALC: 44.6 %
HDLC SERPL-MCNC: 27 MG/DL
HGB BLD-MCNC: 14.8 G/DL
IMM GRANULOCYTES NFR BLD AUTO: 0.5 %
INR PPP: 0.92 RATIO
LDLC SERPL CALC-MCNC: NORMAL MG/DL
LYMPHOCYTES # BLD AUTO: 1.98 K/UL
LYMPHOCYTES NFR BLD AUTO: 25.9 %
MAN DIFF?: NORMAL
MCHC RBC-ENTMCNC: 29.4 PG
MCHC RBC-ENTMCNC: 33.2 GM/DL
MCV RBC AUTO: 88.7 FL
MONOCYTES # BLD AUTO: 0.55 K/UL
MONOCYTES NFR BLD AUTO: 7.2 %
NEUTROPHILS # BLD AUTO: 4.86 K/UL
NEUTROPHILS NFR BLD AUTO: 63.7 %
NONHDLC SERPL-MCNC: 148 MG/DL
PLATELET # BLD AUTO: 216 K/UL
POTASSIUM SERPL-SCNC: 4.7 MMOL/L
PROT SERPL-MCNC: 7.6 G/DL
PT BLD: 10.8 SEC
RBC # BLD: 5.03 M/UL
RBC # FLD: 13.5 %
SODIUM SERPL-SCNC: 136 MMOL/L
TRIGL SERPL-MCNC: 653 MG/DL
TSH SERPL-ACNC: 1.83 UIU/ML
WBC # FLD AUTO: 7.64 K/UL

## 2022-04-07 RX ORDER — FENOFIBRATE 160 MG/1
160 TABLET ORAL
Qty: 90 | Refills: 1 | Status: ACTIVE | COMMUNITY
Start: 2022-04-07 | End: 1900-01-01

## 2022-04-07 NOTE — REVIEW OF SYSTEMS
[Weight Gain (___ Lbs)] : no recent weight gain [Lower Ext Edema] : lower extremity edema [Heartburn] : no heartburn [Numbness (Hypoesthesia)] : numbness [Tingling (Paresthesia)] : tingling [Negative] : Heme/Lymph [FreeTextEntry2] : BMI 32  [de-identified] : right foot  wound

## 2022-04-07 NOTE — ASSESSMENT
[FreeTextEntry1] : stable CAD\par \par stable PAD\par \par nonhealing right foot wound at prior amputation site \par \par \par Poorly controlled diabetes

## 2022-04-07 NOTE — PHYSICAL EXAM
[General Appearance - Well Developed] : well developed [Normal Appearance] : normal appearance [Well Groomed] : well groomed [No Deformities] : no deformities [General Appearance - Well Nourished] : well nourished [General Appearance - In No Acute Distress] : no acute distress [Normal Conjunctiva] : the conjunctiva exhibited no abnormalities [Eyelids - No Xanthelasma] : the eyelids demonstrated no xanthelasmas [Normal Jugular Venous A Waves Present] : normal jugular venous A waves present [Normal Jugular Venous V Waves Present] : normal jugular venous V waves present [No Jugular Venous Morgan A Waves] : no jugular venous morgan A waves [Respiration, Rhythm And Depth] : normal respiratory rhythm and effort [Exaggerated Use Of Accessory Muscles For Inspiration] : no accessory muscle use [Auscultation Breath Sounds / Voice Sounds] : lungs were clear to auscultation bilaterally [Heart Rate And Rhythm] : heart rate and rhythm were normal [Heart Sounds] : normal S1 and S2 [Murmurs] : no murmurs present [FreeTextEntry1] : trace edema  [Abnormal Walk] : normal gait [Nail Clubbing] : no clubbing of the fingernails [Petechial Hemorrhages (___cm)] : no petechial hemorrhages [Cyanosis, Localized] : no localized cyanosis [Skin Color & Pigmentation] : normal skin color and pigmentation [Skin Turgor] : normal skin turgor [] : no rash [No Venous Stasis] : no venous stasis [No Xanthoma] : no  xanthoma was observed [Oriented To Time, Place, And Person] : oriented to person, place, and time [Impaired Insight] : insight and judgment were intact [Affect] : the affect was normal [Mood] : the mood was normal [No Anxiety] : not feeling anxious

## 2022-04-07 NOTE — HISTORY OF PRESENT ILLNESS
[Preoperative Visit] : for a medical evaluation prior to surgery [Scheduled Procedure ___] : a [unfilled] [Date of Surgery ___] : on [unfilled] [Surgeon Name ___] : surgeon: [unfilled] [Fever] : no fever [Chills] : no chills [Fatigue] : no fatigue [Chest Pain] : no chest pain [Cough] : no cough [Dyspnea] : no dyspnea [Dysuria] : no dysuria [Urinary Frequency] : no urinary frequency [Nausea] : no nausea [Vomiting] : no vomiting [Abdominal Pain] : no abdominal pain [Diarrhea] : no diarrhea [Easy Bruising] : no easy bruising [Lower Extremity Swelling] : lower extremity swelling [Poor Exercise Tolerance] : no poor exercise tolerance [Diabetes] : diabetes [Cardiovascular Disease] : cardiovascular disease [Pulmonary Disease] : no pulmonary disease [Anti-Platelet Agents] : anti-platelet agents [Nicotine Dependence] : no nicotine dependence [Alcohol Use] : no  alcohol use [Renal Disease] : no renal disease [GI Disease] : no gastrointestinal disease [Sleep Apnea] : no sleep apnea [Thromboembolic Problems] : no thromboembolic problems [Clotting Disorder] : no clotting disorder [Frequent use of NSAIDs] : no use of NSAIDs [Bleeding Disorder] : no bleeding disorder [Transfusion Reaction] : no transfusion reaction [Impaired Immunity] : no impaired immunity [Steroid Use in Last 6 Months] : no steroid use in the last six months [Frequent Aspirin Use] : frequent aspirin use [Prior Anesthesia] : Prior anesthesia [Prev Anesthesia Reaction] : no previous anesthesia reaction [Electrocardiogram] : ~T an ECG ~C was performed [Echocardiogram] : ~T an echocardiogram ~C was performed [Cardiac Catheterization  (Diagnostic)] : cardiac catheterization ~T ~C was performed [Cardiovascular Stress Test] : a cardiac stress test ~T ~C was performed [Good] : Good [FreeTextEntry1] : 46 year old male with DM and CAD has hx of coronary stents of prox LAD  and RCA . He has  PAD with hx of stents to the right lower extremity  anterior and posterior tibial arteries  after stepping on nail produced an infection requiring amputation of the right 3rd toe and delayed healing.  \par \par His diabetes is poorly controlled \par \par He does have some bleeding of the right foot wound site  indicating good perfusion \par \par He has  chronic low back pain bilateral lag pains mostly at rest when awaking in the am \par \par He has been on continuous aspirin and plavix therapy \par \par No chest pain, CHF , syncope, fevers, chills, night sweats \par \par Denies hx of covid, he is fully vaccinated \par \par EKG today shows  NSR 75 bpm  with marked left axis deviation  without ectopy, ischemia or LVH . No pathologic Q waves are detected \par \par

## 2022-04-07 NOTE — DISCUSSION/SUMMARY
[Procedure Low Risk] : the procedure risk is low [Patient Intermediate Risk] : the patient is an intermediate risk [Optimized for Surgery] : the patient is optimized for surgery [Continue] : Continue medications as currently directed [FreeTextEntry3] : continue aspirin and plavix

## 2022-04-25 DIAGNOSIS — M20.41 OTHER HAMMER TOE(S) (ACQUIRED), RIGHT FOOT: ICD-10-CM

## 2022-04-26 ENCOUNTER — APPOINTMENT (OUTPATIENT)
Dept: ENDOCRINOLOGY | Facility: CLINIC | Age: 47
End: 2022-04-26
Payer: COMMERCIAL

## 2022-04-26 VITALS
OXYGEN SATURATION: 98 % | TEMPERATURE: 98 F | HEART RATE: 79 BPM | SYSTOLIC BLOOD PRESSURE: 145 MMHG | HEIGHT: 75 IN | DIASTOLIC BLOOD PRESSURE: 90 MMHG | BODY MASS INDEX: 32.2 KG/M2 | WEIGHT: 259 LBS

## 2022-04-26 DIAGNOSIS — E11.9 TYPE 2 DIABETES MELLITUS W/OUT COMPLICATIONS: ICD-10-CM

## 2022-04-26 DIAGNOSIS — E78.5 HYPERLIPIDEMIA, UNSPECIFIED: ICD-10-CM

## 2022-04-26 DIAGNOSIS — Z79.4 TYPE 2 DIABETES MELLITUS W/OUT COMPLICATIONS: ICD-10-CM

## 2022-04-26 LAB
GLUCOSE BLDC GLUCOMTR-MCNC: 371
HBA1C MFR BLD HPLC: 14

## 2022-04-26 PROCEDURE — 99214 OFFICE O/P EST MOD 30 MIN: CPT | Mod: 25

## 2022-04-26 PROCEDURE — 82962 GLUCOSE BLOOD TEST: CPT

## 2022-04-26 PROCEDURE — 83036 HEMOGLOBIN GLYCOSYLATED A1C: CPT | Mod: NC,QW

## 2022-04-26 RX ORDER — PEN NEEDLE, DIABETIC 32GX 5/32"
32G X 4 MM NEEDLE, DISPOSABLE MISCELLANEOUS
Qty: 4 | Refills: 3 | Status: ACTIVE | COMMUNITY
Start: 2021-11-02 | End: 1900-01-01

## 2022-04-26 NOTE — HISTORY OF PRESENT ILLNESS
[FreeTextEntry1] : Patient is a 45 yo man with uncontrolled T2DM, recent amputation of 3rd toe, HTN and HLD here for diabetes follow up\par \par Diabetes diagnosed at age 24 years. Was on insulin at some point in the past but stopped after duration of 3 years due to reported GI side effects. Had been on ozempic as well but caused GI problems. Used to see Dr. Edel Jo but she moved to [place]. \par Most recent medicines were metformin 1000 mg BID and glipizide 10 mg. \par He was hospitalized in September 2021 for amputation after stepping on a nail. During that hospitalization, he was started on basal/bolus insulin.\par Macrovascular complications: CAD, amputation\par Adheres to humalog 8-8-14 TID with meals, metformin 1000 mg BID and Lantus 48 at bedtime. Trulicity was started at the last visit but developed GI side effects. Patient had a trial of jardiance in the past but it "didn't agree"\par AM: 200-300s\par 200s\par No hypoglycemia; lowest value was 90\par Breakfast: sometimes skips, usually boiled egg\par Larger meal 7-9 PM: dinner last night chicken Parmesan with spaghetti\par Cut out pasta and rice\par No fast food\par Diet Coke\par Dilated eye exam: retinopathy mild\par Cigarettes on and off for 25 years, stopped 3 months ago\par Has had multiple admissions for foot infection requiring PICC but now completed the antibiotics. \par A1c April 7, 2022 \par 13.4%

## 2022-04-26 NOTE — ASSESSMENT
[Diabetes Foot Care] : diabetes foot care [Long Term Vascular Complications] : long term vascular complications of diabetes [Carbohydrate Consistent Diet] : carbohydrate consistent diet [Importance of Diet and Exercise] : importance of diet and exercise to improve glycemic control, achieve weight loss and improve cardiovascular health [Action and use of Insulin] : action and use of short and long-acting insulin [Self Monitoring of Blood Glucose] : self monitoring of blood glucose [Insulin Self-Administration] : insulin self-administration [Injection Technique, Storage, Sharps Disposal] : injection technique, storage, and sharps disposal [Retinopathy Screening] : Patient was referred to ophthalmology for retinopathy screening [Weight Loss] : weight loss [FreeTextEntry1] : Patient is a 45 yo man with uncontrolled type 2 diabetes and HLD here for diabetes follow up\par \par 1. Uncontrolled T2DM\par -recent toe amputation Right #3, complications include stents and retinopathy. He now has a PICC and is completing course of antibiotics\par -discussed the risks of micro/macrovascular events including, but not limited to, heart attack/stroke/eye complications/kidney disease at length\par -importance of glycemic control discussed; goal A1c of 7-7.5%\par -A1c high 13.4%\par -importance of self-monitoring of blood glucose also discussed. Goal fasting glucose 100-130 with 2 hour post-prandial goals < 180\par -dilated eye exam up to date\par -continued care with vascular\par -he reports high glucose throughout the day without any diurnal patterns.  POCT glucose is > 300 and his A1c is 14% which is significantly worse than a few months ago. He reports dietary and insulin adherence (though he had Chicken Parm with spaghetti last night).  States he cannot eat salads all the time\par -increase Lantus to 60 and humalog to 20 units TID with meals.  I have prescribed u500 but it is unclear whether insurance will approve this. If insurance covers U500, stop the Lantus/Humalog and take U500 25 units BID to start. The risks of HYPOGLYCEMIA was discussed with him at length. He should take first dose on the weekend when he can be at home to monitor sugars. High risk medicines with hypoglycemia as the biggest serious risk.  He states understanding\par \par 2. HLD\par -LDL goal < 70\par -statin\par \par 3 month RPA\par

## 2022-04-26 NOTE — PHYSICAL EXAM
[Alert] : alert [Well Nourished] : well nourished [No Acute Distress] : no acute distress [EOMI] : extra ocular movement intact [Normal Hearing] : hearing was normal [No Respiratory Distress] : no respiratory distress [No Accessory Muscle Use] : no accessory muscle use [Clear to Auscultation] : lungs were clear to auscultation bilaterally [Normal S1, S2] : normal S1 and S2 [Normal Rate] : heart rate was normal [Normal Bowel Sounds] : normal bowel sounds [Not Tender] : non-tender [Soft] : abdomen soft [No Stigmata of Cushings Syndrome] : no stigmata of Cushings Syndrome [Normal Gait] : normal gait [Foot Ulcers] : foot ulcers present [No Motor Deficits] : the motor exam was normal [Normal Affect] : the affect was normal [Normal Insight/Judgement] : insight and judgment were intact [Normal Mood] : the mood was normal [de-identified] : right amputation #3, there is a small wound; goot is warm to touch and ertthematous

## 2022-05-07 ENCOUNTER — RX RENEWAL (OUTPATIENT)
Age: 47
End: 2022-05-07

## 2022-05-07 RX ORDER — LISINOPRIL 40 MG/1
40 TABLET ORAL DAILY
Qty: 1 | Refills: 2 | Status: ACTIVE | COMMUNITY
Start: 2022-05-07 | End: 1900-01-01

## 2022-06-06 ENCOUNTER — APPOINTMENT (OUTPATIENT)
Dept: VASCULAR SURGERY | Facility: CLINIC | Age: 47
End: 2022-06-06
Payer: COMMERCIAL

## 2022-06-10 ENCOUNTER — APPOINTMENT (OUTPATIENT)
Dept: VASCULAR SURGERY | Facility: CLINIC | Age: 47
End: 2022-06-10
Payer: COMMERCIAL

## 2022-06-20 ENCOUNTER — APPOINTMENT (OUTPATIENT)
Dept: VASCULAR SURGERY | Facility: CLINIC | Age: 47
End: 2022-06-20
Payer: COMMERCIAL

## 2022-06-20 VITALS
DIASTOLIC BLOOD PRESSURE: 89 MMHG | HEIGHT: 75 IN | BODY MASS INDEX: 32.2 KG/M2 | SYSTOLIC BLOOD PRESSURE: 133 MMHG | HEART RATE: 78 BPM | WEIGHT: 259 LBS

## 2022-06-20 PROCEDURE — 99212 OFFICE O/P EST SF 10 MIN: CPT

## 2022-06-20 PROCEDURE — 93926 LOWER EXTREMITY STUDY: CPT

## 2022-06-23 NOTE — PHYSICAL EXAM
[JVD] : no jugular venous distention  [Varicose Veins Of Lower Extremities] : not present [] : not present [Purpura] : no purpura  [Petechiae] : no petechiae [Skin Induration] : no induration [de-identified] : well appearing [de-identified] : NC/AT, anicteric [FreeTextEntry1] : R third toe amputation site healed.\par Very small superficial clean ulcer on the fourth toe between the 4/5th toe. No signs of infection, some fibrin.  [de-identified] : FROM throughout, strength 5/5x4, no palpable cords in LEs

## 2022-06-23 NOTE — PROCEDURE
[FreeTextEntry1] : RLE arterial duplex performed to evaluate LAZARO stent reveals widely patent stent and three-vessel runoff to R foot

## 2022-06-23 NOTE — ADDENDUM
[FreeTextEntry1] : \par \par \par \par The documentation for this encounter was entered by Jazmine Woodard acting as scribe for Dr. Thomas Cary. \par

## 2022-06-23 NOTE — ASSESSMENT
[FreeTextEntry1] : 46yoM w/PMHx of DM, CAD, HTN, HLD, initially presented to office after stepping on a nail requiring R 3rd toe amp.  Amp site poorly perfused and pt was taken for RLE angio 10/29/21 w/AT/PT stent.  He is followed by his podiatrist Dr. Hoffman who discussed surgical excision and closure of open wound on the fourth toe. Currently doing wound care with special pad between the toes, but may require further podiatric surgery to heal a punctate 4th toe ulcer.\par \par RLE well-perfused on exam but punctate ulcer noted on the R 4th toe.  RLE arterial duplex performed to evaluate LAZARO stent reveals widely patent stent and three-vessel runoff to R foot.  FU in 6 months.

## 2022-06-23 NOTE — HISTORY OF PRESENT ILLNESS
[FreeTextEntry1] : 47 yo M w/PMHx of DM, CAD, HTN, HLD, initially presented to office after stepping on a nail requiring R 3rd toe amp.  Amp site poorly perfused and pt was taken for RLE angio 10/29/21 w/AT/PT stent.  He is followed by his podiatrist Dr. Hoffman who discussed surgical excision and closure of open wound on the fourth toe. Currently doing wound care with special pad between the toes, but may require further podiatric surgery to heal a punctate 4th toe ulcer. Patient is reluctant to proceed with further podiatric surgery. He is no longer smoking and feels well overall.

## 2022-07-25 ENCOUNTER — APPOINTMENT (OUTPATIENT)
Dept: ENDOCRINOLOGY | Facility: CLINIC | Age: 47
End: 2022-07-25

## 2022-07-25 VITALS
WEIGHT: 258 LBS | HEIGHT: 75 IN | DIASTOLIC BLOOD PRESSURE: 90 MMHG | HEART RATE: 78 BPM | SYSTOLIC BLOOD PRESSURE: 142 MMHG | TEMPERATURE: 97.7 F | OXYGEN SATURATION: 98 % | BODY MASS INDEX: 32.08 KG/M2

## 2022-07-25 DIAGNOSIS — E11.65 TYPE 2 DIABETES MELLITUS WITH HYPERGLYCEMIA: ICD-10-CM

## 2022-07-25 DIAGNOSIS — I25.10 ATHEROSCLEROTIC HEART DISEASE OF NATIVE CORONARY ARTERY W/OUT ANGINA PECTORIS: ICD-10-CM

## 2022-07-25 DIAGNOSIS — I10 ESSENTIAL (PRIMARY) HYPERTENSION: ICD-10-CM

## 2022-07-25 LAB — HBA1C MFR BLD HPLC: 14

## 2022-07-25 PROCEDURE — 83036 HEMOGLOBIN GLYCOSYLATED A1C: CPT | Mod: NC,QW

## 2022-07-25 PROCEDURE — 99214 OFFICE O/P EST MOD 30 MIN: CPT | Mod: 25

## 2022-07-25 RX ORDER — FLASH GLUCOSE SENSOR
KIT MISCELLANEOUS
Qty: 4 | Refills: 11 | Status: ACTIVE | COMMUNITY
Start: 2021-02-26 | End: 1900-01-01

## 2022-07-25 RX ORDER — INSULIN HUMAN 500 [IU]/ML
500 INJECTION, SOLUTION SUBCUTANEOUS
Qty: 5 | Refills: 0 | Status: DISCONTINUED | COMMUNITY
Start: 2022-04-26 | End: 2022-07-25

## 2022-07-25 RX ORDER — INSULIN LISPRO 100 [IU]/ML
100 INJECTION, SOLUTION INTRAVENOUS; SUBCUTANEOUS
Qty: 2 | Refills: 1 | Status: ACTIVE | COMMUNITY
Start: 2021-10-02 | End: 1900-01-01

## 2022-07-25 RX ORDER — INSULIN GLARGINE 100 [IU]/ML
100 INJECTION, SOLUTION SUBCUTANEOUS
Qty: 2 | Refills: 3 | Status: ACTIVE | COMMUNITY
Start: 2021-10-02 | End: 1900-01-01

## 2022-07-25 RX ORDER — DULAGLUTIDE 0.75 MG/.5ML
0.75 INJECTION, SOLUTION SUBCUTANEOUS
Qty: 3 | Refills: 2 | Status: DISCONTINUED | COMMUNITY
Start: 2021-12-14 | End: 2022-07-25

## 2022-07-25 NOTE — HISTORY OF PRESENT ILLNESS
[FreeTextEntry1] : Patient is a 45 yo man with uncontrolled T2DM, amputation of 3rd toe, HTN and HLD here for diabetes follow up\par \par Diabetes diagnosed at age 24 years. Was on insulin at some point in the past but stopped after duration of 3 years due to reported GI side effects. Had been on ozempic as well but caused GI problems. Used to see Dr. Edel Jo but she moved to [place]. \par He was hospitalized in September 2021 for amputation after stepping on a nail. During that hospitalization, he was started on basal/bolus insulin and A1c was 10.9% in [place].\par Macrovascular complications: CAD, amputation\par Despite his self-reported adherence to basal/bolus insulin, A1c remains very high and uncontrolled. Recommended change to u500 at the last visit but he did not pick it up\par Could not tolerate the trulicity and could not tolerate jardiancd\par Checks sugars every other day\par Patient had a trial of jardiance in the past but it "didn't agree"\par Reports he takes lantus 30 then states it's 80 then states it's 30.  He states "I don't remember."\par AM: 200-300s\par 200s\par Breakfast: sometimes skips, usually boiled egg; coffee \par Lunch: skips\par Feels diet is "healthy as far as I'm concerned."\par Dinner: cabbage with meat.  Dinner is biggest meal of the day\par Cut out pasta and rice\par No fast food\par Diet Coke\par Dilated eye exam: retinopathy mild\par Cigarettes on and off for 25 years, stopped 3 months ago\par Has had multiple admissions for foot infection requiring PICC but now completed the antibiotics. \par A1c April 7, 2022 \par 13.4%

## 2022-07-25 NOTE — REVIEW OF SYSTEMS
[Fatigue] : no fatigue [Chest Pain] : no chest pain [Palpitations] : no palpitations [Shortness Of Breath] : no shortness of breath [Nausea] : no nausea [Constipation] : no constipation [Vomiting] : no vomiting [Diarrhea] : no diarrhea [Headaches] : no headaches [Depression] : no depression

## 2022-07-25 NOTE — PHYSICAL EXAM
[Alert] : alert [Well Nourished] : well nourished [No Acute Distress] : no acute distress [EOMI] : extra ocular movement intact [Normal Hearing] : hearing was normal [No Respiratory Distress] : no respiratory distress [No Accessory Muscle Use] : no accessory muscle use [Clear to Auscultation] : lungs were clear to auscultation bilaterally [Normal S1, S2] : normal S1 and S2 [Normal Rate] : heart rate was normal [No Edema] : no peripheral edema [Normal Bowel Sounds] : normal bowel sounds [Not Tender] : non-tender [Soft] : abdomen soft [No Stigmata of Cushings Syndrome] : no stigmata of Cushings Syndrome [Normal Gait] : normal gait [Foot Ulcers] : foot ulcers present [Right foot was examined, including] : right foot ~C was examined, including visual inspection with sensory and pulse exams [Left foot was examined, including] : left foot ~C was examined, including visual inspection with sensory and pulse exams [2+] : 2+ in the posterior tibialis [No Motor Deficits] : the motor exam was normal [Normal Affect] : the affect was normal [Normal Insight/Judgement] : insight and judgment were intact [Normal Mood] : the mood was normal [de-identified] : right toe amputation

## 2022-07-25 NOTE — ASSESSMENT
[Long Term Vascular Complications] : long term vascular complications of diabetes [Importance of Diet and Exercise] : importance of diet and exercise to improve glycemic control, achieve weight loss and improve cardiovascular health [Exercise/Effect on Glucose] : exercise/effect on glucose [FreeTextEntry1] : Patient is a 45 yo man with uncontrolled T2DM, CAD and amputation here for follow up\par \par 1. Diabetes, uncontrolled, high risk\par -patient stopped checking sugars, checks about every second day and values are reportedly high\par -when asking about doses of insulin, his recall is varied.  States he takes lantus 30 then lantus 80 then states he's been taking 30 recently.  Reports that he takes humalog 20 with meals but really only eats one meal a day and thinks his diet is good.  \par -u500 was recommended at the last visit but he never picked it up.  At first, he states he took it but it caused stomach upset then clarifies that perhaps it was was the trulicity which caused GI upset\par -it is not clear to me what medicines the patient is taking for diabetes care. No glucometer or glucose logs have ever been presented at these endocrine visits.  When asking about meals, he provides minimal information other than diet is "healthy."\par -reports he had two viviana sensors that didn't work and that pharmacy did not provide refills. I did not receive requests from refills nor calls from the patient\par -he states he did not tolerate trulicity and jardiance and developed side effects to these medicines.\par -patient reports Dr. Jo was excellent and had him on pills with A1c levels being around 9%.  His a1c was 10.9% in 2020 and reflects long-standing uncontrolled diabetes\par -POCT today remains > 14%\par -declines blood draw\par -per vascular surgery note, his amputation site is poorly perfused and he is reportedly reluctant to proceed with podiatric surgery\par -patient adamant that he does not have Cushing's disease had imaging, testing and was ruled out for it by Dr. Jo. Denies taking mifepristone. He does not want to be retested/rescreened at this time. [facility].\par \par Patient communicates he does not want to come to [facility] for endocrine care and will be going to [facility] instead.  Refills were provided for insulin until he establishes new endocrine care. I've let Dr. Mcgill know as well.

## 2022-07-25 NOTE — DATA REVIEWED
[FreeTextEntry1] : 	 schedule online: [email or web address]  Exam requested by: MARIA VICTORIA TOUSSAINT MD [address] [place] [address] SITE PERFORMED: [facility] Patient: DAKOTA HARGROVE YOB: 1975 Phone: [phone] MRN: [id number] Acc: [id number] Date of Exam: 2018 EXAM:  THREE-PHASE WITHOUT AND WITH CONTRAST CT ABDOMEN   HISTORY:  Cushing syndrome. Type 2 diabetes with hyperglycemia. Adrenal protocol requested..       TECHNIQUE:  CT examination of the abdomen is acquired without oral contrast and before the intravenous administration of 100 cc of 320 mg/mL low osmolar Optiray iodinated contrast material. Arterial phase imaging of the abdomen is then acquired followed by portal venous phase imaging. Delayed phase imaging through the abdomen is obtained. Reformatted 3 mm thick coronal and sagittal views are provided. One or more of the following dose reduction techniques were used: automated exposure control, adjustment of the mA and/or kV according to patient size, use of iterative reconstruction technique.   COMPARISON:  No prior studies are available for comparison..      FINDINGS:  The visualized lung bases are clear.     The liver demonstrates normal attenuation and normal enhancement without evidence for a suspicious mass lesion. There is no intrahepatic ductal dilatation. The liver contour is smooth and the liver is normal in size.     The gallbladder is visualized.  No gallstones are identified.   The  spleen and adrenal glands are within normal limits.   No adrenal masses are present. There is mild fatty replacement of the pancreatic head and uncinate process.  The kidneys are normal in size, shape and position.  There is no renal or ureteral calculi, hydronephrosis, proximal hydroureter or perinephric stranding. There are no enhancing renal masses.     There is no abdominal lymphadenopathy or ascites.     The visualized small and large bowel are unremarkable.     There is no destructive lytic or blastic osseous defect.     IMPRESSION:   1. No evidence of adrenal mass. 2. Slight fatty replacement of the pancreatic head and uncinate process.  3. Otherwise grossly unremarkable CT scan of the abdomen. Thank you for the opportunity to participate in the care of this patient.     Maulik Ji MD [phone] Electronically Signed: 2018 12:56 PM

## 2022-10-12 NOTE — DIETITIAN INITIAL EVALUATION ADULT. - ENTER TO (ML/KG)
30 Cellcept Counseling:  I discussed with the patient the risks of mycophenolate mofetil including but not limited to infection/immunosuppression, GI upset, hypokalemia, hypercholesterolemia, bone marrow suppression, lymphoproliferative disorders, malignancy, GI ulceration/bleed/perforation, colitis, interstitial lung disease, kidney failure, progressive multifocal leukoencephalopathy, and birth defects.  The patient understands that monitoring is required including a baseline creatinine and regular CBC testing. In addition, patient must alert us immediately if symptoms of infection or other concerning signs are noted.

## 2022-12-09 ENCOUNTER — NON-APPOINTMENT (OUTPATIENT)
Age: 47
End: 2022-12-09

## 2022-12-12 ENCOUNTER — RX RENEWAL (OUTPATIENT)
Age: 47
End: 2022-12-12

## 2023-01-06 ENCOUNTER — RX RENEWAL (OUTPATIENT)
Age: 48
End: 2023-01-06

## 2023-01-11 ENCOUNTER — RX RENEWAL (OUTPATIENT)
Age: 48
End: 2023-01-11

## 2023-01-11 NOTE — HISTORY OF PRESENT ILLNESS
[FreeTextEntry1] : 48 yo M w/PMHx of DM, CAD, HTN, HLD, initially presented to office after stepping on a nail requiring R 3rd toe amp.  Amp site poorly perfused and pt was taken for RLE angio 10/29/21 w/AT/PT stent.  He is followed by his podiatrist Dr. Hoffman who discussed surgical excision and closure of open wound on the fourth toe. Currently doing wound care with special pad between the toes, but may require further podiatric surgery to heal a punctate 4th toe ulcer. Patient is reluctant to proceed with further podiatric surgery. He is no longer smoking and feels well overall.

## 2023-01-11 NOTE — PHYSICAL EXAM
[JVD] : no jugular venous distention  [Normal Thyroid] : the thyroid was normal [Respiratory Effort] : normal respiratory effort [Normal Heart Sounds] : normal heart sounds [Ankle Swelling (On Exam)] : present [Ankle Swelling On The Right] : mild [Varicose Veins Of Lower Extremities] : not present [] : not present [Purpura] : no purpura  [Petechiae] : no petechiae [Skin Ulcer] : ulcer [Skin Induration] : no induration [Alert] : alert [Calm] : calm [de-identified] : well appearing [de-identified] : NC/AT, anicteric [FreeTextEntry1] : R third toe amputation site healed.\par Very small superficial clean ulcer on the fourth toe between the 4/5th toe. No signs of infection, some fibrin.  [de-identified] : FROM throughout, strength 5/5x4, no palpable cords in LEs

## 2023-01-11 NOTE — ASSESSMENT
[FreeTextEntry1] : 46 yo M w/PMHx of DM, CAD, HTN, HLD, initially presented to office after stepping on a nail requiring R 3rd toe amp.  Amp site poorly perfused and pt was taken for RLE angio 10/29/21 w/AT/PT stent.  He is followed by his podiatrist Dr. Hoffman who discussed surgical excision and closure of open wound on the fourth toe. Currently doing wound care with special pad between the toes, but may require further podiatric surgery to heal a punctate 4th toe ulcer.\par \par RLE well-perfused on exam but punctate ulcer noted on the R 4th toe.  RLE arterial duplex performed to evaluate LAZARO stent reveals widely patent stent and three-vessel runoff to R foot.  FU in 6 months.

## 2023-01-12 ENCOUNTER — APPOINTMENT (OUTPATIENT)
Dept: VASCULAR SURGERY | Facility: CLINIC | Age: 48
End: 2023-01-12

## 2023-03-23 ENCOUNTER — RX RENEWAL (OUTPATIENT)
Age: 48
End: 2023-03-23

## 2023-04-29 NOTE — DISCHARGE NOTE PROVIDER - NSDCQMERRANDS_GEN_ALL_CORE
Problem: At Risk for Falls  Goal: # Patient does not fall  Outcome: Outcome Met, Continue evaluating goal progress toward completion     Problem: Impaired Physical Mobility  Goal: # Bed mobility, ambulation, and ADLs are maintained or returned to baseline during hospitalization  Outcome: Outcome Not Met, Continue to Monitor     Problem: Dysphagia  Goal: # Exhibits no s/s of aspiration  Description: Symptoms of aspiration include coughing, choking, throat clearing, change in voice quality, and/or a decrease in oxygen saturation by more than 2% points from baseline after swallowing.  Outcome: Outcome Met, Continue evaluating goal progress toward completion     Problem: Self Care Deficit  Goal: # Functional status is maintained or returned to baseline - REHAB ONLY  Outcome: Outcome Not Met, Continue to Monitor      No

## 2023-08-21 NOTE — DISCHARGE NOTE NURSING/CASE MANAGEMENT/SOCIAL WORK - PATIENT PORTAL LINK FT
For medication: omeprazole (PrilOSEC) 20 MG capsule    90 Day supply requested? Yes    Comments: in script bin    Pharmacy loaded below: Yes   You can access the FollowMyHealth Patient Portal offered by [facility] by registering at the following website: [email or web address]. By joining [facility]’s FollowMyHealth portal, you will also be able to view your health information using other applications (apps) compatible with our system.

## 2024-02-26 NOTE — ED ADULT TRIAGE NOTE - SOURCE OF INFORMATION
Patient oriented to room and ED throughput process.  Safety measures with ED bed locked in lowest position and call light in reach.  Patient educated on all orders, including any medications.  Patient educated on chief complaint/symptoms. Patient encouraged to ask questions regarding care, medications or treatment plan.  Patient aware of how to reach staff with questions/concerns.     Patient

## 2024-08-27 NOTE — PATIENT PROFILE ADULT - TRANSPORTATION
August 29, 2024      Sarkis Joyner  [address] [place] [address]        Dear ,      Blood sugar still very high at 318 at the time of your visit.      That is great that you have an appointment on 9/17/24 with the diabetic educator; this will help you learn more about managing your diabetes.      Please consider scheduling an appointment with me in the next 1 to 2 months.  I know you see the endocrinologist who helps to manage your blood sugar, I can also help guide you through this process and can likely see you much more often than the endocrinologist will be able to to help fine-tune your management of blood sugar.      You do not have Lyme disease as the cause of your Bell's palsy (facial droop).     Resulted Orders   Glucose   Result Value Ref Range    Glucose 318 (H) 70 - 99 mg/dL    Patient Fasting > 8hrs? Yes    LYME DISEASE TOTAL ANTIBODIES WITH REFLEX TO CONFIRMATION   Result Value Ref Range    Lyme Disease Antibodies Total 0.06 <0.90      Comment:      Non-reactive, Absence of detectable Borrelia burgdorferi antibodies. A non-reactive result does not exclude the possibility of Borrelia burgdorferi infection. If early Lyme disease is suspected, a second sample should be collected and tested 2 to 4 weeks later.       If you have any questions or concerns, please call the clinic at the number listed above.     Have a nice weekend,    Sincerely,      Kashmir Jackson MD   home no

## 2025-02-13 ENCOUNTER — NON-APPOINTMENT (OUTPATIENT)
Age: 50
End: 2025-02-13